# Patient Record
Sex: FEMALE | Race: WHITE | NOT HISPANIC OR LATINO | Employment: OTHER | URBAN - METROPOLITAN AREA
[De-identification: names, ages, dates, MRNs, and addresses within clinical notes are randomized per-mention and may not be internally consistent; named-entity substitution may affect disease eponyms.]

---

## 2018-02-06 RX ORDER — LISINOPRIL 20 MG/1
20 TABLET ORAL 2 TIMES DAILY
Status: ON HOLD | COMMUNITY
End: 2020-05-02 | Stop reason: SDUPTHER

## 2018-02-06 RX ORDER — METOPROLOL TARTRATE 100 MG/1
25 TABLET ORAL EVERY 12 HOURS SCHEDULED
COMMUNITY
End: 2020-02-24 | Stop reason: DRUGHIGH

## 2018-02-06 RX ORDER — ACETAMINOPHEN 160 MG
2000 TABLET,DISINTEGRATING ORAL EVERY MORNING
COMMUNITY
End: 2021-08-10 | Stop reason: SDUPTHER

## 2018-02-09 ENCOUNTER — HOSPITAL ENCOUNTER (OUTPATIENT)
Facility: HOSPITAL | Age: 68
Setting detail: OUTPATIENT SURGERY
Discharge: HOME | End: 2018-02-09
Attending: COLON & RECTAL SURGERY | Admitting: COLON & RECTAL SURGERY
Payer: COMMERCIAL

## 2018-02-09 ENCOUNTER — ANESTHESIA (OUTPATIENT)
Dept: PERIOP | Facility: HOSPITAL | Age: 68
End: 2018-02-09
Payer: COMMERCIAL

## 2018-02-09 ENCOUNTER — ANESTHESIA EVENT (OUTPATIENT)
Dept: PERIOP | Facility: HOSPITAL | Age: 68
End: 2018-02-09
Payer: COMMERCIAL

## 2018-02-09 VITALS
SYSTOLIC BLOOD PRESSURE: 156 MMHG | DIASTOLIC BLOOD PRESSURE: 80 MMHG | TEMPERATURE: 97.7 F | RESPIRATION RATE: 16 BRPM | BODY MASS INDEX: 34.53 KG/M2 | OXYGEN SATURATION: 99 % | WEIGHT: 220 LBS | HEART RATE: 73 BPM | HEIGHT: 67 IN

## 2018-02-09 DIAGNOSIS — K63.5 POLYP OF COLON: ICD-10-CM

## 2018-02-09 PROCEDURE — 88305 TISSUE EXAM BY PATHOLOGIST: CPT | Performed by: COLON & RECTAL SURGERY

## 2018-02-09 PROCEDURE — 88305 TISSUE EXAM BY PATHOLOGIST: CPT | Performed by: PATHOLOGY

## 2018-02-09 PROCEDURE — 88307 TISSUE EXAM BY PATHOLOGIST: CPT | Performed by: PATHOLOGY

## 2018-02-09 PROCEDURE — 88307 TISSUE EXAM BY PATHOLOGIST: CPT | Performed by: COLON & RECTAL SURGERY

## 2018-02-09 RX ORDER — FENTANYL CITRATE 50 UG/ML
INJECTION, SOLUTION INTRAMUSCULAR; INTRAVENOUS AS NEEDED
Status: DISCONTINUED | OUTPATIENT
Start: 2018-02-09 | End: 2018-02-09 | Stop reason: SURG

## 2018-02-09 RX ORDER — PROPOFOL 10 MG/ML
INJECTION, EMULSION INTRAVENOUS AS NEEDED
Status: DISCONTINUED | OUTPATIENT
Start: 2018-02-09 | End: 2018-02-09 | Stop reason: SURG

## 2018-02-09 RX ORDER — OXYCODONE HYDROCHLORIDE AND ACETAMINOPHEN 5; 325 MG/1; MG/1
2 TABLET ORAL EVERY 6 HOURS PRN
Qty: 20 TABLET | Refills: 0 | Status: SHIPPED | OUTPATIENT
Start: 2018-02-09 | End: 2018-02-19

## 2018-02-09 RX ORDER — ONDANSETRON 2 MG/ML
4 INJECTION INTRAMUSCULAR; INTRAVENOUS ONCE AS NEEDED
Status: DISCONTINUED | OUTPATIENT
Start: 2018-02-09 | End: 2018-02-09 | Stop reason: HOSPADM

## 2018-02-09 RX ORDER — EPHEDRINE SULFATE 50 MG/ML
INJECTION, SOLUTION INTRAVENOUS AS NEEDED
Status: DISCONTINUED | OUTPATIENT
Start: 2018-02-09 | End: 2018-02-09 | Stop reason: SURG

## 2018-02-09 RX ORDER — SODIUM CHLORIDE, SODIUM LACTATE, POTASSIUM CHLORIDE, CALCIUM CHLORIDE 600; 310; 30; 20 MG/100ML; MG/100ML; MG/100ML; MG/100ML
50 INJECTION, SOLUTION INTRAVENOUS CONTINUOUS
Status: DISCONTINUED | OUTPATIENT
Start: 2018-02-09 | End: 2018-02-09 | Stop reason: HOSPADM

## 2018-02-09 RX ORDER — OXYCODONE HYDROCHLORIDE 5 MG/1
10 TABLET ORAL EVERY 4 HOURS PRN
Status: DISCONTINUED | OUTPATIENT
Start: 2018-02-09 | End: 2018-02-09 | Stop reason: HOSPADM

## 2018-02-09 RX ORDER — ONDANSETRON 2 MG/ML
4 INJECTION INTRAMUSCULAR; INTRAVENOUS EVERY 4 HOURS PRN
Status: DISCONTINUED | OUTPATIENT
Start: 2018-02-09 | End: 2018-02-09 | Stop reason: HOSPADM

## 2018-02-09 RX ORDER — BUPIVACAINE HYDROCHLORIDE AND EPINEPHRINE 2.5; 5 MG/ML; UG/ML
INJECTION, SOLUTION EPIDURAL; INFILTRATION; INTRACAUDAL; PERINEURAL AS NEEDED
Status: DISCONTINUED | OUTPATIENT
Start: 2018-02-09 | End: 2018-02-09 | Stop reason: HOSPADM

## 2018-02-09 RX ORDER — HEPARIN SODIUM 5000 [USP'U]/ML
5000 INJECTION, SOLUTION INTRAVENOUS; SUBCUTANEOUS ONCE
Status: COMPLETED | OUTPATIENT
Start: 2018-02-09 | End: 2018-02-09

## 2018-02-09 RX ORDER — OXYCODONE HYDROCHLORIDE 5 MG/1
5 TABLET ORAL EVERY 4 HOURS PRN
Status: DISCONTINUED | OUTPATIENT
Start: 2018-02-09 | End: 2018-02-09 | Stop reason: HOSPADM

## 2018-02-09 RX ORDER — ONDANSETRON 2 MG/ML
INJECTION INTRAMUSCULAR; INTRAVENOUS AS NEEDED
Status: DISCONTINUED | OUTPATIENT
Start: 2018-02-09 | End: 2018-02-09 | Stop reason: SURG

## 2018-02-09 RX ORDER — LIDOCAINE HYDROCHLORIDE 10 MG/ML
INJECTION, SOLUTION INFILTRATION; PERINEURAL AS NEEDED
Status: DISCONTINUED | OUTPATIENT
Start: 2018-02-09 | End: 2018-02-09 | Stop reason: SURG

## 2018-02-09 RX ORDER — SUCCINYLCHOLINE CHLORIDE 20 MG/ML
INJECTION INTRAMUSCULAR; INTRAVENOUS AS NEEDED
Status: DISCONTINUED | OUTPATIENT
Start: 2018-02-09 | End: 2018-02-09 | Stop reason: SURG

## 2018-02-09 RX ORDER — FENTANYL CITRATE/PF 50 MCG/ML
25 SYRINGE (ML) INJECTION AS NEEDED
Status: DISCONTINUED | OUTPATIENT
Start: 2018-02-09 | End: 2018-02-09 | Stop reason: HOSPADM

## 2018-02-09 RX ADMIN — ONDANSETRON 4 MG: 2 INJECTION INTRAMUSCULAR; INTRAVENOUS at 14:53

## 2018-02-09 RX ADMIN — METRONIDAZOLE 500 MG: 500 INJECTION, SOLUTION INTRAVENOUS at 13:16

## 2018-02-09 RX ADMIN — FENTANYL CITRATE 50 MCG: 50 INJECTION, SOLUTION INTRAMUSCULAR; INTRAVENOUS at 15:07

## 2018-02-09 RX ADMIN — HEPARIN SODIUM 5000 UNITS: 5000 INJECTION, SOLUTION INTRAVENOUS; SUBCUTANEOUS at 11:54

## 2018-02-09 RX ADMIN — CEFAZOLIN SODIUM 2000 MG: 2 SOLUTION INTRAVENOUS at 13:15

## 2018-02-09 RX ADMIN — DEXAMETHASONE SODIUM PHOSPHATE 10 MG: 10 INJECTION INTRAMUSCULAR; INTRAVENOUS at 13:10

## 2018-02-09 RX ADMIN — SUCCINYLCHOLINE CHLORIDE 100 MG: 20 INJECTION, SOLUTION INTRAMUSCULAR; INTRAVENOUS at 13:02

## 2018-02-09 RX ADMIN — SODIUM CHLORIDE, SODIUM LACTATE, POTASSIUM CHLORIDE, AND CALCIUM CHLORIDE 50 ML/HR: .6; .31; .03; .02 INJECTION, SOLUTION INTRAVENOUS at 10:55

## 2018-02-09 RX ADMIN — LIDOCAINE HYDROCHLORIDE 50 MG: 10 INJECTION, SOLUTION INFILTRATION; PERINEURAL at 13:02

## 2018-02-09 RX ADMIN — PROPOFOL 150 MG: 10 INJECTION, EMULSION INTRAVENOUS at 13:02

## 2018-02-09 RX ADMIN — FENTANYL CITRATE 25 MCG: 50 INJECTION, SOLUTION INTRAMUSCULAR; INTRAVENOUS at 14:12

## 2018-02-09 RX ADMIN — EPHEDRINE SULFATE 5 MG: 50 INJECTION, SOLUTION INTRAMUSCULAR; INTRAVENOUS; SUBCUTANEOUS at 14:12

## 2018-02-09 RX ADMIN — SODIUM CHLORIDE, SODIUM LACTATE, POTASSIUM CHLORIDE, AND CALCIUM CHLORIDE: .6; .31; .03; .02 INJECTION, SOLUTION INTRAVENOUS at 14:39

## 2018-02-09 RX ADMIN — FENTANYL CITRATE 25 MCG: 50 INJECTION, SOLUTION INTRAMUSCULAR; INTRAVENOUS at 14:32

## 2018-02-09 RX ADMIN — FENTANYL CITRATE 50 MCG: 50 INJECTION, SOLUTION INTRAMUSCULAR; INTRAVENOUS at 13:02

## 2018-02-09 RX ADMIN — FENTANYL CITRATE 50 MCG: 50 INJECTION, SOLUTION INTRAMUSCULAR; INTRAVENOUS at 13:24

## 2018-02-09 NOTE — PERIOPERATIVE NURSING NOTE
VSS, pt denies pain or nausea, assessment unchanged, report called, no questions, pt transferred to [facility]

## 2018-02-09 NOTE — ANESTHESIA PREPROCEDURE EVALUATION
Review of Systems/Medical History  Patient summary reviewed  Chart reviewed  No history of anesthetic complications     Cardiovascular  EKG reviewed, Negative cardio ROS Exercise tolerance: good,  Hypertension ,    Pulmonary       GI/Hepatic    GI malignancy,        Negative  ROS        Endo/Other  Negative endo/other ROS      GYN       Hematology  Negative hematology ROS      Musculoskeletal  Negative musculoskeletal ROS        Neurology  Negative neurology ROS      Psychology   Anxiety,              Physical Exam    Airway    Mallampati score: II  TM Distance: >3 FB  Neck ROM: full     Dental   No notable dental hx     Cardiovascular  Comment: Negative ROS, Rhythm: regular, Rate: normal,     Pulmonary  Breath sounds clear to auscultation,     Other Findings        Anesthesia Plan  ASA Score- 2     Anesthesia Type- general with ASA Monitors  Additional Monitors:   Airway Plan:         Plan Factors-    Induction- intravenous  Postoperative Plan- Plan for postoperative opioid use  Planned trial extubation    Informed Consent- Anesthetic plan and risks discussed with patient and spouse  I personally reviewed this patient with the CRNA  Discussed and agreed on the Anesthesia Plan with the CRNA  Hugh Lambert

## 2018-02-09 NOTE — OP NOTE
OPERATIVE REPORT  PATIENT NAME: Julia Pace    :  1950  MRN: [id number]  Pt Location: [facility] OR ROOM 04    SURGERY DATE: 2018    Surgeon(s) and Role:     * Mary Patel MD - Primary     * Darryll Mcardle, MD - Assisting    Preop Diagnosis:  Rectal polyp    Post-Op Diagnosis Codes:  Rectal polyp    Procedure:   Transanal excision of large rectal polyp  Mucosal proctectomy    Specimen(s):  ID Type Source Tests Collected by Time Destination   1 : SUPERIOR MARGIN POLYPOID TISSUE Tissue Other TISSUE EXAM Mary Patel MD 2018 1403    2 : RECTAL POLYP Tissue Polyp, Colorectal TISSUE EXAM Mary Patel MD 2018 1432        Estimated Blood Loss:   Minimal    Drains:       Anesthesia Type:   General    Operative Indications:  Rectal polyp    Operative Findings:  Rectal polyp    Complications:   None    Procedure and Technique:  The patient was placed in a prone jorge-knife position with the buttocks taped apart  The anal area was prepped using Betadine and draped in a sterile manner  A time-out was done  Anal inspection was unremarkable  Digital rectal exam was normal except for a very soft and difficult to identify post anal polyp  This incorporated much of the circumference on the region extending from the left posterolateral area around the posterior rectum to the right anterior region  This incorporated more than half the circumference of the rectum  The polyp extended from the anal transitional zone proximally to the mid rectum, approximately 10 cm from the anal verge  Transanal excision was undertaken with a method of perineal mucosal proctectomy (Delorme procedure)  We used a Lambert bivalve retractor for most of the procedure but also used Titus tube retractors and Titus anal retractors  The anterior and left anterior regions were not included in this procedure but the remainder of the circumference of the rectum was    We 1st used an anal retractor to identify the distal polyp which was soft and frondlike  1 cm distal to the polyp, a cautery line was drawn to obtain a 1 cm diameter margin on the polyp  Margins were retained circumferentially but on evaluation of the postoperative specimen, there was substantial contraction of these margins  The dissection distally was taken down to the level of the submucosal plane and then was diverted superiorly in the avascular submucosa  The muscularis propria was  from the mucosa in this plane as we dissected superiorly  This was essentially a Delorme procedure in 3 corners of the rectum  The polyp layers  from the muscularis propria nicely  A few mucosal defects were created while retracting the mucosa and polyp but this was only after the mucosa was cleanly dissected away from the attachments and we were using the polyp as an anchor for retraction  The dissection itself did not involve any breaks in the mucosal plane  At no point did we identify any penetration of the polypoid tissue deep to the mucosa  The dissection was continued superiorly until we had the polyp freed up from its attachments with a margin  At this time the proximal sides of the polyp were amputated  In the left anterior position, a clean margin was obviously delineated  On the right side and anteriorly, there was some more difficulty and the margin was not created before the dissection but there was some tearing of the mucosa there  We reinspected that region specifically to ensure that there was no evidence of polyp remaining at the conclusion of the dissection  The specimen was tacked out on a needle board with orientation for the pathologist   The areas of dissection were inspected and all mucosal borders appeared to be free of polyp      The wound was then closed internally using a reefing method to incorporate muscularis propria in a pleated fashion to appose the proximal rectal mucosa to the anoderm or transitional zone as was found on the distal margin of the initial dissection  This full-thickness closure was done to minimize risk for bleeding and to reduce dead space  At the conclusion of the procedure, we inspected all the mucosal edges and had to reinforce the right posterior region with another line of 0 Vicryl sutures to bring the mucosa more snugly to the distal margin  The final product was a mucosa to mucosa closure with no bleeding or gaps  No polypoid tissue was evident  The lumen was widely open  Sponge needle and instrument counts were correct       I was present for the entire procedure    Patient Disposition:  PACU     SIGNATURE: Norman Coello MD  DATE: February 9, 2018  TIME: 3:17 PM

## 2018-02-09 NOTE — H&P
History and Physical   Colon and Rectal Surgery   Heidi Scott 78 y o  female MRN: [id number]  Unit/Bed#: OR POOL Encounter: [id number]  02/09/18   12:42 PM      CC: Extensive rectal polyp  History of Present Illness   HPI:  Heidi Scott is a 78 y o  female with a nearly circumferential rectal polyp extending from the anorectal angle to 10 cm from the anal verge  Historical Information   Past Medical History:   Diagnosis Date    Anxiety     Colon polyps     Hypertension      Past Surgical History:   Procedure Laterality Date    COLONOSCOPY      NO PAST SURGERIES         Meds/Allergies     Prescriptions Prior to Admission   Medication    cholecalciferol (VITAMIN D3) 1,000 units tablet    lisinopril (ZESTRIL) 20 mg tablet    metoprolol tartrate (LOPRESSOR) 100 mg tablet         Current Facility-Administered Medications:     ceFAZolin (ANCEF) IVPB (premix) 2,000 mg, 2,000 mg, Intravenous, Once, Miller Avila MD    lactated ringers infusion, 50 mL/hr, Intravenous, Continuous, W Sanchez Gutierrez MD, Last Rate: 50 mL/hr at 02/09/18 1055, 50 mL/hr at 02/09/18 1055    metroNIDAZOLE (FLAGYL) IVPB (premix) 500 mg, 500 mg, Intravenous, Once, Miller Avila MD    No Known Allergies      Social History   History   Alcohol Use    0 6 oz/week    1 Glasses of wine per week     History   Drug use: Unknown     History   Smoking Status    Former Smoker   Smokeless Tobacco    Never Used     Comment: 27  YEARS AGO          Family History: History reviewed  No pertinent family history        Objective     Current Vitals:   Blood Pressure: (!) 191/90 (r  n  aware) (02/09/18 1035)  Pulse: 74 (02/09/18 1035)  Temperature: 98 4 °F (36 9 °C) (02/09/18 1035)  Temp Source: Oral (02/09/18 1035)  Respirations: 20 (02/09/18 1035)  Height: 5' 7" (170 2 cm) (02/09/18 1051)  Weight - Scale: 99 8 kg (220 lb) (02/09/18 1051)  SpO2: 97 % (02/09/18 1035)  No intake or output data in the 24 hours ending 02/09/18 1242    Physical Exam:  General:  Well nourished, no distress  Neuro: Alert and oriented  Eyes:Sclera anicteric, conjunctiva pink  Pulm: Clear to auscultation bilaterally  No respiratory Distress  CV:  Regular rate and rhythm  No murmurs  Abdomen:  Soft, flat, non-tender, without masses or hepatosplenomegaly  Lab Results:       ASSESSMENT:  Lyndsay Galdamez is a 78 y o  female for transanal excision of rectal polyp  PLAN:  Risks of surgery, including but not limited to bleeding, pain, infection, hernia formation, injury to the ureter or other internal organs requiring surgery specific to these injuries, deep vein thrombosis, pulmonary embolism, myocardial infarction or heart failure, stroke, death, need for and enterostomy, or other unspecified complications were discussed  Benefits and alternatives were discussed  Questions were answered    Andrews Santos MD

## 2018-02-09 NOTE — DISCHARGE INSTRUCTIONS
Instructions    - Take pain medication as prescribed for post op pain    - Eat a high fiber diet and plenty of fluids    - Some amount of bleeding is normal and may be expected, but for persistent rectal bleeding please return to the emergency department    - Take several sitz baths daily, this will help keep the area clean and relieve pain  Instructions below     [employer]   WHAT YOU NEED TO KNOW:   A sitz bath is when you soak in a warm or hot water tub to promote wound healing  It is often done after surgeries on the rectal or genital area  The heat from the water will clean the area, improve blood flow for healing, and decrease swelling and pain  DISCHARGE INSTRUCTIONS:   Gather your sitz bath supplies:   · A bathtub thermometer to check the water temperature    · Towels to cover your upper body during the bath and to dry off after    · A footstool to help you enter the bathtub if needed    · Bath mats to prevent slips in the tub and after you get out of the tub    · Clean clothes to wear after the bath  Fill the bathtub with water:  Fill the bathtub with water until it is at about the level of your belly button  Check the temperature of the water before you get in  For a warm water sitz bath, the water should be 94° to 98° Fahrenheit  A hot water sitz bath should be between 105° to 110° Fahrenheit  Stay in the bath for about 15 to 20 minutes  Portable sitz bath: You may be sent home with a portable sitz bath if you cannot get in and out of the bathtub  This is a small tub that will fit under your toilet seat  You will fill the tub with water as directed once it is under the toilet seat  Check the temperature of the water  You will also have a squirt bottle or water bag filled with the warm water  These are used to let the water flow out over the wound area during the sitz bath  This is also done for 15 to 20 minutes    Important tips when taking a sitz bath:   · You may have some discomfort when at first when you sit in the warm water  This should go away shortly after entering the tub  · Check the water temperature a few times during the bath  You may need to add more water to keep it at the right temperature  · Take a sitz bath when someone is close by to help you if needed  The heat from the water may cause you to feel weak or lightheaded  If this happens, call for help to get out of the tub  Do not stand up on your own in case you lose your balance

## 2018-02-09 NOTE — ANESTHESIA POSTPROCEDURE EVALUATION
Post-Op Assessment Note      CV Status:  Stable    Mental Status:  Alert and awake    Hydration Status:  Euvolemic    PONV Controlled:  Controlled    Airway Patency:  Patent    Post Op Vitals Reviewed: Yes          Staff: Anesthesiologist, CRNA           /77 (02/09/18 1505)    Temp 99 2 °F (37 3 °C) (02/09/18 1505)    Pulse 75 (02/09/18 1505)   Resp 14 (02/09/18 1505)    SpO2 99 % (02/09/18 1505)

## 2018-05-04 LAB
25(OH)D3 SERPL-MCNC: 47.1 NG/ML
ALBUMIN (HISTORICAL): 1.8 MG/DL
ALBUMIN CREATININE RATIO (HISTORICAL): 10.2 MG/G
ALBUMIN SERPL BCP-MCNC: 4.3 G/DL (ref 3.5–5.7)
ALP SERPL-CCNC: 54 IU/L (ref 55–165)
ALT SERPL W P-5'-P-CCNC: 13 IU/L (ref 10–30)
ANION GAP SERPL CALCULATED.3IONS-SCNC: 12.6 MM/L
AST SERPL W P-5'-P-CCNC: 16 U/L (ref 7–26)
BACTERIA UR QL AUTO: ABNORMAL
BASOPHILS # BLD AUTO: 0.1 X3/UL (ref 0–0.3)
BASOPHILS # BLD AUTO: 0.8 % (ref 0–2)
BILIRUB SERPL-MCNC: 0.7 MG/DL (ref 0.3–1)
BILIRUB UR QL STRIP: NEGATIVE
BUN SERPL-MCNC: 9 MG/DL (ref 7–25)
CALCIUM SERPL-MCNC: 9.3 MG/DL (ref 8.6–10.5)
CHLORIDE SERPL-SCNC: 98 MM/L (ref 98–107)
CHOLEST SERPL-MCNC: 275 MG/DL (ref 0–200)
CLARITY UR: CLEAR
CO2 SERPL-SCNC: 27 MM/L (ref 21–31)
COLOR UR: YELLOW
CREAT SERPL-MCNC: 1.06 MG/DL (ref 0.6–1.2)
CREATININE, RANDOM URINE (HISTORICAL): 176.1 MG/DL
DEPRECATED RDW RBC AUTO: 13.3 % (ref 11.5–14.5)
EGFR (HISTORICAL): 52 GFR
EGFR AFRICAN AMERICAN (HISTORICAL): > 60 GFR
EOSINOPHIL # BLD AUTO: 0.1 X3/UL (ref 0–0.5)
EOSINOPHIL NFR BLD AUTO: 1.9 % (ref 0–5)
GLUCOSE (HISTORICAL): 101 MG/DL (ref 65–99)
GLUCOSE UR STRIP-MCNC: NEGATIVE MG/DL
HCT VFR BLD AUTO: 40.2 % (ref 37–47)
HDLC SERPL-MCNC: 41 MG/DL (ref 40–60)
HGB BLD-MCNC: 13.4 G/DL (ref 12–16)
HGB UR QL STRIP.AUTO: ABNORMAL
KETONES UR STRIP-MCNC: NEGATIVE MG/DL
LDLC SERPL CALC-MCNC: 189.6 MG/DL (ref 75–193)
LEUKOCYTE ESTERASE UR QL STRIP: ABNORMAL
LYMPHOCYTES # BLD AUTO: 2.6 X3/UL (ref 1.2–4.2)
LYMPHOCYTES NFR BLD AUTO: 41.5 % (ref 20.5–51.1)
MCH RBC QN AUTO: 28.9 PG (ref 26–34)
MCHC RBC AUTO-ENTMCNC: 33.3 G/DL (ref 31–36)
MCV RBC AUTO: 86.9 FL (ref 81–99)
MONOCYTES # BLD AUTO: 0.5 X3/UL (ref 0–1)
MONOCYTES NFR BLD AUTO: 8.2 % (ref 1.7–12)
NEUTROPHILS # BLD AUTO: 3 X3/UL (ref 1.4–6.5)
NEUTS SEG NFR BLD AUTO: 47.6 % (ref 42.2–75.2)
NITRITE UR QL STRIP: NEGATIVE
NON-SQ EPI CELLS URNS QL MICRO: ABNORMAL /HPF
OSMOLALITY, SERUM (HISTORICAL): 267 MOSM (ref 262–291)
PH UR STRIP.AUTO: 6 [PH] (ref 4.5–8)
PLATELET # BLD AUTO: 390 X3/UL (ref 130–400)
PMV BLD AUTO: 7.3 FL (ref 8.6–11.7)
POTASSIUM SERPL-SCNC: 3.6 MM/L (ref 3.5–5.5)
PROT UR STRIP-MCNC: NEGATIVE MG/DL
RBC # BLD AUTO: 4.63 X6/UL (ref 3.9–5.2)
RBC #/AREA URNS AUTO: ABNORMAL /HPF
SODIUM SERPL-SCNC: 134 MM/L (ref 134–143)
SP GR UR STRIP.AUTO: 1.01 (ref 1–1.03)
TOTAL PROTEIN (HISTORICAL): 7.8 G/DL (ref 6.4–8.9)
TRIGL SERPL-MCNC: 221 MG/DL (ref 44–166)
UROBILINOGEN UR QL STRIP.AUTO: 0.2 EU/DL (ref 0.2–8)
VLDL CHOLESTEROL (HISTORICAL): 44 MG/DL (ref 5–51)
WBC # BLD AUTO: 6.3 X3/UL (ref 4.8–10.8)
WBC #/AREA URNS AUTO: ABNORMAL /HPF

## 2018-05-30 LAB
ANION GAP SERPL CALCULATED.3IONS-SCNC: 13.1 MM/L
BUN SERPL-MCNC: 12 MG/DL (ref 7–25)
CALCIUM SERPL-MCNC: 9.5 MG/DL (ref 8.6–10.5)
CHLORIDE SERPL-SCNC: 92 MM/L (ref 98–107)
CO2 SERPL-SCNC: 29 MM/L (ref 21–31)
CREAT SERPL-MCNC: 1 MG/DL (ref 0.6–1.2)
EGFR (HISTORICAL): 55 GFR
EGFR AFRICAN AMERICAN (HISTORICAL): > 60 GFR
GLUCOSE (HISTORICAL): 98 MG/DL (ref 65–99)
OSMOLALITY, SERUM (HISTORICAL): 261 MOSM (ref 262–291)
POTASSIUM SERPL-SCNC: 4.1 MM/L (ref 3.5–5.5)
SODIUM SERPL-SCNC: 130 MM/L (ref 134–143)

## 2018-09-11 ENCOUNTER — TRANSCRIBE ORDERS (OUTPATIENT)
Dept: ADMINISTRATIVE | Facility: HOSPITAL | Age: 68
End: 2018-09-11

## 2018-09-11 ENCOUNTER — APPOINTMENT (OUTPATIENT)
Dept: LAB | Facility: HOSPITAL | Age: 68
End: 2018-09-11
Attending: INTERNAL MEDICINE
Payer: COMMERCIAL

## 2018-09-11 DIAGNOSIS — N18.30 CHRONIC KIDNEY DISEASE, STAGE III (MODERATE) (HCC): Primary | ICD-10-CM

## 2018-09-11 DIAGNOSIS — E78.5 HYPERLIPIDEMIA, UNSPECIFIED HYPERLIPIDEMIA TYPE: ICD-10-CM

## 2018-09-11 DIAGNOSIS — N18.30 CHRONIC KIDNEY DISEASE, STAGE III (MODERATE) (HCC): ICD-10-CM

## 2018-09-11 LAB
ALBUMIN SERPL BCP-MCNC: 4.3 G/DL (ref 3.5–5.7)
ALP SERPL-CCNC: 53 U/L (ref 55–165)
ALT SERPL W P-5'-P-CCNC: 15 U/L (ref 7–52)
ANION GAP SERPL CALCULATED.3IONS-SCNC: 8 MMOL/L (ref 4–13)
AST SERPL W P-5'-P-CCNC: 14 U/L (ref 13–39)
BILIRUB SERPL-MCNC: 0.4 MG/DL (ref 0.2–1)
BUN SERPL-MCNC: 12 MG/DL (ref 7–25)
CALCIUM SERPL-MCNC: 9.5 MG/DL (ref 8.6–10.5)
CHLORIDE SERPL-SCNC: 103 MMOL/L (ref 98–107)
CO2 SERPL-SCNC: 29 MMOL/L (ref 21–31)
CREAT SERPL-MCNC: 1.01 MG/DL (ref 0.6–1.2)
GFR SERPL CREATININE-BSD FRML MDRD: 58 ML/MIN/1.73SQ M
GLUCOSE SERPL-MCNC: 93 MG/DL (ref 65–99)
LDLC SERPL DIRECT ASSAY-MCNC: 66.7 MG/DL (ref 75–193)
POTASSIUM SERPL-SCNC: 4.2 MMOL/L (ref 3.5–5.5)
PROT SERPL-MCNC: 7.4 G/DL (ref 6.4–8.9)
SODIUM SERPL-SCNC: 140 MMOL/L (ref 134–143)

## 2018-09-11 PROCEDURE — 80053 COMPREHEN METABOLIC PANEL: CPT

## 2018-09-11 PROCEDURE — 83721 ASSAY OF BLOOD LIPOPROTEIN: CPT

## 2018-09-11 PROCEDURE — 36415 COLL VENOUS BLD VENIPUNCTURE: CPT

## 2019-05-15 ENCOUNTER — TRANSCRIBE ORDERS (OUTPATIENT)
Dept: ADMINISTRATIVE | Facility: HOSPITAL | Age: 69
End: 2019-05-15

## 2019-05-15 DIAGNOSIS — Z12.39 BREAST SCREENING, UNSPECIFIED: Primary | ICD-10-CM

## 2019-05-16 ENCOUNTER — TRANSCRIBE ORDERS (OUTPATIENT)
Dept: ADMINISTRATIVE | Facility: HOSPITAL | Age: 69
End: 2019-05-16

## 2019-05-16 ENCOUNTER — APPOINTMENT (OUTPATIENT)
Dept: LAB | Facility: HOSPITAL | Age: 69
End: 2019-05-16
Attending: INTERNAL MEDICINE
Payer: COMMERCIAL

## 2019-05-16 DIAGNOSIS — E55.9 VITAMIN D DEFICIENCY: ICD-10-CM

## 2019-05-16 DIAGNOSIS — N18.30 CHRONIC KIDNEY DISEASE, STAGE III (MODERATE) (HCC): ICD-10-CM

## 2019-05-16 DIAGNOSIS — R80.9 PROTEINURIA, UNSPECIFIED TYPE: ICD-10-CM

## 2019-05-16 DIAGNOSIS — E78.5 HYPERLIPIDEMIA, UNSPECIFIED HYPERLIPIDEMIA TYPE: ICD-10-CM

## 2019-05-16 DIAGNOSIS — N18.30 CHRONIC KIDNEY DISEASE, STAGE III (MODERATE) (HCC): Primary | ICD-10-CM

## 2019-05-16 LAB
25(OH)D3 SERPL-MCNC: 30.1 NG/ML (ref 30–100)
ALBUMIN SERPL BCP-MCNC: 4.4 G/DL (ref 3.5–5.7)
ALP SERPL-CCNC: 51 U/L (ref 55–165)
ALT SERPL W P-5'-P-CCNC: 15 U/L (ref 7–52)
ANION GAP SERPL CALCULATED.3IONS-SCNC: 10 MMOL/L (ref 4–13)
AST SERPL W P-5'-P-CCNC: 12 U/L (ref 13–39)
BACTERIA UR QL AUTO: ABNORMAL /HPF
BASOPHILS # BLD AUTO: 0.1 THOUSANDS/ΜL (ref 0–0.1)
BASOPHILS NFR BLD AUTO: 1 % (ref 0–2)
BILIRUB SERPL-MCNC: 0.5 MG/DL (ref 0.2–1)
BILIRUB UR QL STRIP: NEGATIVE
BUN SERPL-MCNC: 22 MG/DL (ref 7–25)
CALCIUM SERPL-MCNC: 9.8 MG/DL (ref 8.6–10.5)
CHLORIDE SERPL-SCNC: 98 MMOL/L (ref 98–107)
CHOLEST SERPL-MCNC: 150 MG/DL (ref 0–200)
CLARITY UR: ABNORMAL
CO2 SERPL-SCNC: 29 MMOL/L (ref 21–31)
COLOR UR: YELLOW
CREAT SERPL-MCNC: 1.2 MG/DL (ref 0.6–1.2)
CREAT UR-MCNC: 133 MG/DL
CREAT UR-MCNC: 133 MG/DL
EOSINOPHIL # BLD AUTO: 0.1 THOUSAND/ΜL (ref 0–0.61)
EOSINOPHIL NFR BLD AUTO: 2 % (ref 0–5)
ERYTHROCYTE [DISTWIDTH] IN BLOOD BY AUTOMATED COUNT: 13.3 % (ref 11.5–14.5)
GFR SERPL CREATININE-BSD FRML MDRD: 47 ML/MIN/1.73SQ M
GLUCOSE P FAST SERPL-MCNC: 101 MG/DL (ref 65–99)
GLUCOSE UR STRIP-MCNC: NEGATIVE MG/DL
HCT VFR BLD AUTO: 40.2 % (ref 42–47)
HDLC SERPL-MCNC: 47 MG/DL (ref 40–60)
HGB BLD-MCNC: 13.5 G/DL (ref 12–16)
HGB UR QL STRIP.AUTO: NEGATIVE
KETONES UR STRIP-MCNC: NEGATIVE MG/DL
LDLC SERPL CALC-MCNC: 63 MG/DL (ref 0–100)
LEUKOCYTE ESTERASE UR QL STRIP: ABNORMAL
LYMPHOCYTES # BLD AUTO: 2.8 THOUSANDS/ΜL (ref 0.6–4.47)
LYMPHOCYTES NFR BLD AUTO: 36 % (ref 21–51)
MCH RBC QN AUTO: 29.4 PG (ref 26–34)
MCHC RBC AUTO-ENTMCNC: 33.5 G/DL (ref 31–37)
MCV RBC AUTO: 88 FL (ref 81–99)
MICROALBUMIN UR-MCNC: 23.8 MG/L (ref 0–20)
MICROALBUMIN/CREAT 24H UR: 18 MG/G CREATININE (ref 0–30)
MONOCYTES # BLD AUTO: 0.6 THOUSAND/ΜL (ref 0.17–1.22)
MONOCYTES NFR BLD AUTO: 7 % (ref 2–12)
NEUTROPHILS # BLD AUTO: 4.3 THOUSANDS/ΜL (ref 1.4–6.5)
NEUTS SEG NFR BLD AUTO: 55 % (ref 42–75)
NITRITE UR QL STRIP: NEGATIVE
NON-SQ EPI CELLS URNS QL MICRO: ABNORMAL /HPF
NONHDLC SERPL-MCNC: 103 MG/DL
PH UR STRIP.AUTO: 6 [PH]
PLATELET # BLD AUTO: 441 THOUSANDS/UL (ref 149–390)
PMV BLD AUTO: 7.6 FL (ref 8.6–11.7)
POTASSIUM SERPL-SCNC: 4 MMOL/L (ref 3.5–5.5)
PROT SERPL-MCNC: 7.6 G/DL (ref 6.4–8.9)
PROT UR STRIP-MCNC: NEGATIVE MG/DL
PROT UR-MCNC: 11 MG/DL
PROT/CREAT UR: 0.08 MG/G{CREAT} (ref 0–0.1)
RBC # BLD AUTO: 4.58 MILLION/UL (ref 3.9–5.2)
RBC #/AREA URNS AUTO: ABNORMAL /HPF
SODIUM SERPL-SCNC: 137 MMOL/L (ref 134–143)
SP GR UR STRIP.AUTO: 1.01 (ref 1–1.03)
TRIGL SERPL-MCNC: 198 MG/DL (ref 44–166)
UROBILINOGEN UR QL STRIP.AUTO: 0.2 E.U./DL
WBC # BLD AUTO: 7.8 THOUSAND/UL (ref 4.8–10.8)
WBC #/AREA URNS AUTO: ABNORMAL /HPF

## 2019-05-16 PROCEDURE — 82043 UR ALBUMIN QUANTITATIVE: CPT | Performed by: INTERNAL MEDICINE

## 2019-05-16 PROCEDURE — 82306 VITAMIN D 25 HYDROXY: CPT

## 2019-05-16 PROCEDURE — 80061 LIPID PANEL: CPT

## 2019-05-16 PROCEDURE — 85025 COMPLETE CBC W/AUTO DIFF WBC: CPT

## 2019-05-16 PROCEDURE — 36415 COLL VENOUS BLD VENIPUNCTURE: CPT

## 2019-05-16 PROCEDURE — 80053 COMPREHEN METABOLIC PANEL: CPT

## 2019-05-16 PROCEDURE — 81001 URINALYSIS AUTO W/SCOPE: CPT

## 2019-05-16 PROCEDURE — 82570 ASSAY OF URINE CREATININE: CPT | Performed by: INTERNAL MEDICINE

## 2019-05-16 PROCEDURE — 84156 ASSAY OF PROTEIN URINE: CPT | Performed by: INTERNAL MEDICINE

## 2019-05-22 ENCOUNTER — HOSPITAL ENCOUNTER (OUTPATIENT)
Dept: MAMMOGRAPHY | Facility: HOSPITAL | Age: 69
Discharge: HOME | End: 2019-05-22
Attending: INTERNAL MEDICINE
Payer: COMMERCIAL

## 2019-05-22 VITALS — WEIGHT: 217 LBS | BODY MASS INDEX: 32.89 KG/M2 | HEIGHT: 68 IN

## 2019-05-22 DIAGNOSIS — Z12.39 BREAST SCREENING, UNSPECIFIED: ICD-10-CM

## 2019-05-22 PROCEDURE — 77067 SCR MAMMO BI INCL CAD: CPT

## 2019-05-22 PROCEDURE — 77063 BREAST TOMOSYNTHESIS BI: CPT

## 2019-07-03 PROBLEM — C20 RECTAL CANCER (HCC): Status: ACTIVE | Noted: 2019-07-03

## 2019-10-05 ENCOUNTER — APPOINTMENT (EMERGENCY)
Dept: CT IMAGING | Facility: HOSPITAL | Age: 69
End: 2019-10-05
Payer: COMMERCIAL

## 2019-10-05 ENCOUNTER — HOSPITAL ENCOUNTER (EMERGENCY)
Facility: HOSPITAL | Age: 69
Discharge: HOME | End: 2019-10-05
Payer: COMMERCIAL

## 2019-10-05 VITALS
RESPIRATION RATE: 18 BRPM | DIASTOLIC BLOOD PRESSURE: 91 MMHG | BODY MASS INDEX: 33.34 KG/M2 | TEMPERATURE: 98.7 F | HEART RATE: 60 BPM | OXYGEN SATURATION: 97 % | SYSTOLIC BLOOD PRESSURE: 154 MMHG | HEIGHT: 68 IN | WEIGHT: 220 LBS

## 2019-10-05 DIAGNOSIS — K52.9 COLITIS: Primary | ICD-10-CM

## 2019-10-05 LAB
ALBUMIN SERPL BCP-MCNC: 4.5 G/DL (ref 3.5–5.7)
ALP SERPL-CCNC: 51 U/L (ref 55–165)
ALT SERPL W P-5'-P-CCNC: 15 U/L (ref 7–52)
ANION GAP SERPL CALCULATED.3IONS-SCNC: 9 MMOL/L (ref 4–13)
APTT PPP: 27 SECONDS (ref 23–37)
AST SERPL W P-5'-P-CCNC: 17 U/L (ref 13–39)
BACTERIA UR QL AUTO: ABNORMAL /HPF
BASOPHILS # BLD AUTO: 0.1 THOUSANDS/ΜL (ref 0–0.1)
BASOPHILS NFR BLD AUTO: 1 % (ref 0–2)
BILIRUB SERPL-MCNC: 0.4 MG/DL (ref 0.2–1)
BILIRUB UR QL STRIP: ABNORMAL
BUN SERPL-MCNC: 19 MG/DL (ref 7–25)
CALCIUM SERPL-MCNC: 9.6 MG/DL (ref 8.6–10.5)
CAOX CRY URNS QL MICRO: ABNORMAL /HPF
CHLORIDE SERPL-SCNC: 93 MMOL/L (ref 98–107)
CLARITY UR: CLEAR
CO2 SERPL-SCNC: 25 MMOL/L (ref 21–31)
COLOR UR: YELLOW
CREAT SERPL-MCNC: 1.1 MG/DL (ref 0.6–1.2)
EOSINOPHIL # BLD AUTO: 0 THOUSAND/ΜL (ref 0–0.61)
EOSINOPHIL NFR BLD AUTO: 0 % (ref 0–5)
ERYTHROCYTE [DISTWIDTH] IN BLOOD BY AUTOMATED COUNT: 12.8 % (ref 11.5–14.5)
EXT FECAL OCCULT BLOOD SCREEN: POSITIVE
EXT. CONTROL ED NAV: ABNORMAL
GFR SERPL CREATININE-BSD FRML MDRD: 52 ML/MIN/1.73SQ M
GLUCOSE SERPL-MCNC: 132 MG/DL (ref 65–99)
GLUCOSE UR STRIP-MCNC: NEGATIVE MG/DL
HCT VFR BLD AUTO: 38.5 % (ref 42–47)
HGB BLD-MCNC: 13.2 G/DL (ref 12–16)
HGB UR QL STRIP.AUTO: ABNORMAL
HOLD SPECIMEN: NORMAL
HOLD SPECIMEN: NORMAL
HYALINE CASTS #/AREA URNS LPF: ABNORMAL /LPF
INR PPP: 0.97 (ref 0.9–1.5)
KETONES UR STRIP-MCNC: NEGATIVE MG/DL
LEUKOCYTE ESTERASE UR QL STRIP: NEGATIVE
LIPASE SERPL-CCNC: 26 U/L (ref 11–82)
LYMPHOCYTES # BLD AUTO: 1.5 THOUSANDS/ΜL (ref 0.6–4.47)
LYMPHOCYTES NFR BLD AUTO: 11 % (ref 21–51)
MCH RBC QN AUTO: 30.2 PG (ref 26–34)
MCHC RBC AUTO-ENTMCNC: 34.4 G/DL (ref 31–37)
MCV RBC AUTO: 88 FL (ref 81–99)
MONOCYTES # BLD AUTO: 0.5 THOUSAND/ΜL (ref 0.17–1.22)
MONOCYTES NFR BLD AUTO: 4 % (ref 2–12)
MUCOUS THREADS UR QL AUTO: ABNORMAL
NEUTROPHILS # BLD AUTO: 11.2 THOUSANDS/ΜL (ref 1.4–6.5)
NEUTS SEG NFR BLD AUTO: 84 % (ref 42–75)
NITRITE UR QL STRIP: NEGATIVE
NON-SQ EPI CELLS URNS QL MICRO: ABNORMAL /HPF
PH UR STRIP.AUTO: 6 [PH]
PLATELET # BLD AUTO: 456 THOUSANDS/UL (ref 149–390)
PMV BLD AUTO: 7.1 FL (ref 8.6–11.7)
POTASSIUM SERPL-SCNC: 3.6 MMOL/L (ref 3.5–5.5)
PROT SERPL-MCNC: 7.8 G/DL (ref 6.4–8.9)
PROT UR STRIP-MCNC: ABNORMAL MG/DL
PROTHROMBIN TIME: 11.3 SECONDS (ref 10.2–13)
RBC # BLD AUTO: 4.39 MILLION/UL (ref 3.9–5.2)
RBC #/AREA URNS AUTO: ABNORMAL /HPF
SODIUM SERPL-SCNC: 127 MMOL/L (ref 134–143)
SP GR UR STRIP.AUTO: 1.02 (ref 1–1.03)
TROPONIN I SERPL-MCNC: <0.03 NG/ML
TROPONIN I SERPL-MCNC: <0.03 NG/ML
UROBILINOGEN UR QL STRIP.AUTO: 0.2 E.U./DL
WBC # BLD AUTO: 13.3 THOUSAND/UL (ref 4.8–10.8)
WBC #/AREA URNS AUTO: ABNORMAL /HPF

## 2019-10-05 PROCEDURE — 85610 PROTHROMBIN TIME: CPT

## 2019-10-05 PROCEDURE — 80053 COMPREHEN METABOLIC PANEL: CPT

## 2019-10-05 PROCEDURE — 36415 COLL VENOUS BLD VENIPUNCTURE: CPT

## 2019-10-05 PROCEDURE — 83690 ASSAY OF LIPASE: CPT

## 2019-10-05 PROCEDURE — 85025 COMPLETE CBC W/AUTO DIFF WBC: CPT

## 2019-10-05 PROCEDURE — C9113 INJ PANTOPRAZOLE SODIUM, VIA: HCPCS

## 2019-10-05 PROCEDURE — 96361 HYDRATE IV INFUSION ADD-ON: CPT

## 2019-10-05 PROCEDURE — 96374 THER/PROPH/DIAG INJ IV PUSH: CPT

## 2019-10-05 PROCEDURE — 74177 CT ABD & PELVIS W/CONTRAST: CPT

## 2019-10-05 PROCEDURE — 84484 ASSAY OF TROPONIN QUANT: CPT

## 2019-10-05 PROCEDURE — 85730 THROMBOPLASTIN TIME PARTIAL: CPT

## 2019-10-05 PROCEDURE — 99284 EMERGENCY DEPT VISIT MOD MDM: CPT

## 2019-10-05 PROCEDURE — 82270 OCCULT BLOOD FECES: CPT

## 2019-10-05 PROCEDURE — 81001 URINALYSIS AUTO W/SCOPE: CPT

## 2019-10-05 RX ORDER — METRONIDAZOLE 500 MG/1
500 TABLET ORAL EVERY 8 HOURS SCHEDULED
Qty: 21 TABLET | Refills: 0 | Status: SHIPPED | OUTPATIENT
Start: 2019-10-05 | End: 2019-10-12

## 2019-10-05 RX ORDER — CIPROFLOXACIN 500 MG/1
500 TABLET, FILM COATED ORAL 2 TIMES DAILY
Qty: 14 TABLET | Refills: 0 | Status: SHIPPED | OUTPATIENT
Start: 2019-10-05 | End: 2019-10-12

## 2019-10-05 RX ORDER — CIPROFLOXACIN 500 MG/1
500 TABLET, FILM COATED ORAL ONCE
Status: COMPLETED | OUTPATIENT
Start: 2019-10-05 | End: 2019-10-05

## 2019-10-05 RX ORDER — PANTOPRAZOLE SODIUM 40 MG/1
40 INJECTION, POWDER, FOR SOLUTION INTRAVENOUS ONCE
Status: COMPLETED | OUTPATIENT
Start: 2019-10-05 | End: 2019-10-05

## 2019-10-05 RX ORDER — METRONIDAZOLE 500 MG/1
500 TABLET ORAL ONCE
Status: COMPLETED | OUTPATIENT
Start: 2019-10-05 | End: 2019-10-05

## 2019-10-05 RX ADMIN — IOHEXOL 50 ML: 240 INJECTION, SOLUTION INTRATHECAL; INTRAVASCULAR; INTRAVENOUS; ORAL at 08:03

## 2019-10-05 RX ADMIN — METRONIDAZOLE 500 MG: 500 TABLET, FILM COATED ORAL at 11:38

## 2019-10-05 RX ADMIN — SODIUM CHLORIDE 1000 ML: 0.9 INJECTION, SOLUTION INTRAVENOUS at 08:20

## 2019-10-05 RX ADMIN — CIPROFLOXACIN HYDROCHLORIDE 500 MG: 500 TABLET, FILM COATED ORAL at 11:38

## 2019-10-05 RX ADMIN — IOHEXOL 100 ML: 350 INJECTION, SOLUTION INTRAVENOUS at 10:28

## 2019-10-05 RX ADMIN — PANTOPRAZOLE SODIUM 40 MG: 40 INJECTION, POWDER, FOR SOLUTION INTRAVENOUS at 08:20

## 2019-10-05 NOTE — ED PROVIDER NOTES
History  Chief Complaint   Patient presents with    Abdominal Pain     bright blood aftyer having diarrhea     Leonard Simon is a 43-year-old female who came to the emergency department due to generalized abdominal pain associated with diarrhea with blood streaks with started 12 hours prior to arrival   Patient denies vomiting, fever or chills  On arrival in the emergency department patient is almost pain-free  Patient denies hematuria, dysuria or frequency of urination  History provided by:  Patient   used: No    Abdominal Pain   Pain location:  Generalized  Pain quality: cramping    Pain radiates to:  Does not radiate  Pain severity:  Mild  Onset quality:  Sudden  Duration:  12 hours  Timing:  Intermittent  Progression:  Waxing and waning  Chronicity:  New  Context: not alcohol use, not awakening from sleep, not diet changes, not eating, not laxative use, not medication withdrawal, not previous surgeries, not recent illness, not recent sexual activity, not recent travel, not retching, not sick contacts, not suspicious food intake and not trauma    Relieved by:  Nothing  Worsened by:  Nothing  Ineffective treatments:  None tried  Associated symptoms: diarrhea and hematochezia    Associated symptoms: no anorexia, no belching, no chest pain, no chills, no constipation, no cough, no dysuria, no fatigue, no fever, no flatus, no hematemesis, no hematuria, no melena, no nausea, no shortness of breath, no sore throat, no vaginal bleeding, no vaginal discharge and no vomiting    Diarrhea:     Quality:  Blood-tinged    Number of occurrences:  Unable to specify    Severity:  Mild    Duration:  12 hours    Timing:  Intermittent    Progression:  Unchanged  Risk factors: being elderly and obesity        Prior to Admission Medications   Prescriptions Last Dose Informant Patient Reported? Taking?    cholecalciferol (VITAMIN D3) 1,000 units tablet   Yes Yes   Sig: Take 2,000 Units by mouth daily lisinopril (ZESTRIL) 20 mg tablet   Yes Yes   Sig: Take 20 mg by mouth 2 (two) times a day   metoprolol tartrate (LOPRESSOR) 100 mg tablet   Yes Yes   Sig: Take 25 mg by mouth every 12 (twelve) hours   rosuvastatin (CRESTOR) 10 MG tablet   Yes Yes   Sig: Take 10 mg by mouth daily      Facility-Administered Medications: None       Past Medical History:   Diagnosis Date    Anxiety     Colon polyps     Hyperlipidemia     Hypertension        Past Surgical History:   Procedure Laterality Date    BREAST BIOPSY Right     COLONOSCOPY      NO PAST SURGERIES      RECTAL BIOPSY N/A 2/9/2018    Procedure: TRANSANAL EXCISION RECTAL POLYP;MUCOSAL POLYPECTOMY;  Surgeon: Maryam Arana MD;  Location[facility];  Service: Colorectal       Family History   Problem Relation Age of Onset    Hypertension Mother     Heart disease Father     No Known Problems Daughter     No Known Problems Maternal Grandmother     No Known Problems Maternal Grandfather     No Known Problems Paternal Grandmother     No Known Problems Paternal Grandfather      I have reviewed and agree with the history as documented  Social History     Tobacco Use    Smoking status: Former Smoker    Smokeless tobacco: Never Used    Tobacco comment: 30  YEARS AGO    Substance Use Topics    Alcohol use: Yes     Alcohol/week: 1 0 standard drinks     Types: 1 Glasses of wine per week    Drug use: Not on file        Review of Systems   Constitutional: Negative for chills, fatigue and fever  HENT: Negative for sore throat  Eyes: Negative  Respiratory: Negative for cough and shortness of breath  Cardiovascular: Negative for chest pain  Gastrointestinal: Positive for abdominal pain, blood in stool, diarrhea and hematochezia  Negative for anorexia, constipation, flatus, hematemesis, melena, nausea and vomiting  Endocrine: Negative  Genitourinary: Negative for dysuria, hematuria, vaginal bleeding and vaginal discharge     Musculoskeletal: Negative  Skin: Negative  Allergic/Immunologic: Negative  Neurological: Negative  Hematological: Negative  Psychiatric/Behavioral: Negative  Physical Exam  Physical Exam   Constitutional: She is oriented to person, place, and time  She appears well-developed and well-nourished  Non-toxic appearance  She does not appear ill  No distress  HENT:   Head: Normocephalic and atraumatic  Mouth/Throat: No oropharyngeal exudate  Eyes: Pupils are equal, round, and reactive to light  EOM are normal  No scleral icterus  Cardiovascular: Normal rate, regular rhythm, normal heart sounds and intact distal pulses  Exam reveals no gallop and no friction rub  No murmur heard  Pulmonary/Chest: Effort normal and breath sounds normal  No stridor  No respiratory distress  Abdominal: Soft  Normal appearance and bowel sounds are normal  She exhibits no distension, no pulsatile midline mass and no mass  There is no tenderness  Genitourinary: Rectal exam shows guaiac positive stool  Rectal exam shows no mass and no tenderness  Neurological: She is alert and oriented to person, place, and time  Skin: Skin is warm and dry  No rash noted  She is not diaphoretic  No cyanosis or erythema  No pallor  Psychiatric: She has a normal mood and affect  Her behavior is normal    Nursing note and vitals reviewed        Vital Signs  ED Triage Vitals   Temperature Pulse Respirations Blood Pressure SpO2   10/05/19 0739 10/05/19 0739 10/05/19 0739 10/05/19 0740 10/05/19 0739   98 3 °F (36 8 °C) (!) 113 20 170/97 97 %      Temp Source Heart Rate Source Patient Position - Orthostatic VS BP Location FiO2 (%)   10/05/19 0830 10/05/19 0830 -- 10/05/19 0900 --   Temporal Monitor  Right arm       Pain Score       10/05/19 0830       No Pain           Vitals:    10/05/19 0830 10/05/19 0900 10/05/19 0930 10/05/19 1115   BP: 128/75 152/72 145/74 154/91   Pulse: 59 60 62 69         Visual Acuity      ED Medications  Medications metroNIDAZOLE (FLAGYL) tablet 500 mg (has no administration in time range)   ciprofloxacin (CIPRO) tablet 500 mg (has no administration in time range)   sodium chloride 0 9 % bolus 1,000 mL (0 mL Intravenous Stopped 10/5/19 1000)   pantoprazole (PROTONIX) injection 40 mg (40 mg Intravenous Given 10/5/19 0820)   iohexol (OMNIPAQUE) 240 MG/ML solution 50 mL (50 mL Oral Given 10/5/19 0803)   iohexol (OMNIPAQUE) 350 MG/ML injection (MULTI-DOSE) 100 mL (100 mL Intravenous Given 10/5/19 1028)       Diagnostic Studies  Results Reviewed     Procedure Component Value Units Date/Time    Troponin I [[id number]] Collected:  10/05/19 1119    Lab Status: In process Specimen:  Blood from Arm, Right Updated:  10/05/19 1121    Howard draw [[id number]] Collected:  10/05/19 0800    Lab Status:  Final result Specimen:  Blood from Arm, Left Updated:  10/05/19 4243    Narrative: The following orders were created for panel order Howard draw  Procedure                               Abnormality         Status                     ---------                               -----------         ------                     Dominick Cost top on BRAH[[id number]]                                 Final result               Green / Black tube on SQXI[[id number]]                       Final result               Lavender Top 3 ml on BAGW[[id number]]                        Final result               Lavender Top 7ml on BVZF[[id number]]                         Final result                 Please view results for these tests on the individual orders      Urine Microscopic [[id number]]  (Abnormal) Collected:  10/05/19 0811    Lab Status:  Final result Specimen:  Urine, Clean Catch Updated:  10/05/19 0841     RBC, UA 2-4 /hpf      WBC, UA 2-4 /hpf      Epithelial Cells Moderate /hpf      Bacteria, UA Occasional /hpf      Hyaline Casts, UA 0-1 /lpf      Ca Oxalate Zulay, UA Occasional /hpf      MUCUS THREADS Moderate    UA w Reflex to Microscopic w Reflex to Culture [[id number]]  (Abnormal) Collected:  10/05/19 0811    Lab Status:  Final result Specimen:  Urine, Clean Catch Updated:  10/05/19 0840     Color, UA Yellow     Clarity, UA Clear     Specific Gravity, UA 1 025     pH, UA 6 0     Leukocytes, UA Negative     Nitrite, UA Negative     Protein, UA 1+ mg/dl      Glucose, UA Negative mg/dl      Ketones, UA Negative mg/dl      Urobilinogen, UA 0 2 E U /dl      Bilirubin, UA 1+     Blood, UA Trace-Intact    Lipase [[id number]]  (Normal) Collected:  10/05/19 0757    Lab Status:  Final result Specimen:  Blood from Arm, Left Updated:  10/05/19 0832     Lipase 26 u/L     Troponin I [[id number]]  (Normal) Collected:  10/05/19 0757    Lab Status:  Final result Specimen:  Blood from Arm, Left Updated:  10/05/19 0832     Troponin I <0 03 ng/mL     Protime-INR [[id number]]  (Normal) Collected:  10/05/19 0757    Lab Status:  Final result Specimen:  Blood from Arm, Left Updated:  10/05/19 0831     Protime 11 3 seconds      INR 0 97    Comprehensive metabolic panel [[id number]]  (Abnormal) Collected:  10/05/19 0757    Lab Status:  Final result Specimen:  Blood from Arm, Left Updated:  10/05/19 0831     Sodium 127 mmol/L      Potassium 3 6 mmol/L      Chloride 93 mmol/L      CO2 25 mmol/L      ANION GAP 9 mmol/L      BUN 19 mg/dL      Creatinine 1 10 mg/dL      Glucose 132 mg/dL      Calcium 9 6 mg/dL      AST 17 U/L      ALT 15 U/L      Alkaline Phosphatase 51 U/L      Total Protein 7 8 g/dL      Albumin 4 5 g/dL      Total Bilirubin 0 40 mg/dL      eGFR 52 ml/min/1 73sq m     Narrative:       [facility] guidelines for Chronic Kidney Disease (CKD):     Stage 1 with normal or high GFR (GFR > 90 mL/min/1 73 square meters)    Stage 2 Mild CKD (GFR = 60-89 mL/min/1 73 square meters)    Stage 3A Moderate CKD (GFR = 45-59 mL/min/1 73 square meters)    Stage 3B Moderate CKD (GFR = 30-44 mL/min/1 73 square meters)    Stage 4 Severe CKD (GFR = 15-29 mL/min/1 73 square meters)    Stage 5 End Stage CKD (GFR <15 mL/min/1 73 square meters)  Note: GFR calculation is accurate only with a steady state creatinine    APTT [[id number]]  (Normal) Collected:  10/05/19 0757    Lab Status:  Final result Specimen:  Blood from Arm, Left Updated:  10/05/19 0831     PTT 27 seconds     CBC and differential [[id number]]  (Abnormal) Collected:  10/05/19 0757    Lab Status:  Final result Specimen:  Blood from Arm, Left Updated:  10/05/19 0813     WBC 13 30 Thousand/uL      RBC 4 39 Million/uL      Hemoglobin 13 2 g/dL      Hematocrit 38 5 %      MCV 88 fL      MCH 30 2 pg      MCHC 34 4 g/dL      RDW 12 8 %      MPV 7 1 fL      Platelets 217 Thousands/uL      Neutrophils Relative 84 %      Lymphocytes Relative 11 %      Monocytes Relative 4 %      Eosinophils Relative 0 %      Basophils Relative 1 %      Neutrophils Absolute 11 20 Thousands/µL      Lymphocytes Absolute 1 50 Thousands/µL      Monocytes Absolute 0 50 Thousand/µL      Eosinophils Absolute 0 00 Thousand/µL      Basophils Absolute 0 10 Thousands/µL     POCT occult blood stool [[id number]]  (Abnormal) Resulted:  10/05/19 0807    Lab Status:  Final result Specimen:  Stool Updated:  10/05/19 0807     EXT Fecal Occult Blood (Ref: Negative) positive     Control valid                 CT abdomen pelvis with contrast   Final Result by Heydi Cueva MD (10/05 1052)      1  Colitis involving the descending colon  2   15 mm right adrenal nodule  Although its imaging features are indeterminate, it does not have suspicious imaging features (heterogeneity, necrosis, irregular margins), therefore this is likely benign, and can be followed by non-contrast abdomen CT    or MRI in 12 months  If patient has history of adrenal hyperfunction, consider biochemical evaluation  Adrenal recommendation based on institutional consensus and Journal of [employer] of Radiology 2017;14:1749-7736      3    Right psoas intramuscular cyst measuring 8 x 3 x 2 9 cm centered at the level of the inguinal region, of uncertain etiology and clinical significance  Nonemergent ultrasound is recommended for further evaluation  The study was marked in [facility] for immediate notification  Workstation performed: [id number]                    Procedures  ECG 12 Lead Documentation Only  Date/Time: 10/5/2019 8:19 AM  Performed by: Lorna Solis MD  Authorized by: Lorna Solis MD     Indications / Diagnosis:  Abdominal pain  ECG reviewed by me, the ED Provider: yes    Patient location:  ED  Previous ECG:     Previous ECG:  Unavailable    Comparison to cardiac monitor: Yes    Interpretation:     Interpretation: normal    Rate:     ECG rate:  65 beats per minute    ECG rate assessment: normal    Rhythm:     Rhythm: sinus rhythm    Ectopy:     Ectopy: none    QRS:     QRS axis:  Normal    QRS intervals:  Normal  Conduction:     Conduction: normal    ST segments:     ST segments:  Normal  T waves:     T waves: normal             ED Course  ED Course as of Oct 05 1131   Sat Oct 05, 2019   1127 Patient is resting comfortably on the stretcher  I discussed with her the results of her blood work, urinalysis, EKG, chest x-ray and CT scan of the abdomen and pelvis with oral and IV contrast   I discussed with her the incidental findings right adrenal nodule and cyst in the abdominal muscle  I advised her to follow up with her family doctor regarding her adrenal nodule which is about 15 mm in size  Patient agreed  I informed the patient that she will be given a prescription for Flagyl 500 mg to be taken 3 times a day for 1 week                                    MDM  Number of Diagnoses or Management Options  Colitis: new and requires workup     Amount and/or Complexity of Data Reviewed  Clinical lab tests: ordered and reviewed  Tests in the radiology section of CPT®: ordered and reviewed  Tests in the medicine section of CPT®: ordered and reviewed  Decide to obtain previous medical records or to obtain history from someone other than the patient: yes  Obtain history from someone other than the patient: yes  Review and summarize past medical records: yes  Independent visualization of images, tracings, or specimens: yes    Risk of Complications, Morbidity, and/or Mortality  Presenting problems: moderate  Diagnostic procedures: moderate  Management options: moderate    Patient Progress  Patient progress: improved      Disposition  Final diagnoses:   Colitis     Time reflects when diagnosis was documented in both MDM as applicable and the Disposition within this note     Time User Action Codes Description Comment    10/5/2019 11:28 AM Rico Grajedan Add [K52 9] Colitis       ED Disposition     ED Disposition Condition Date/Time Comment    Discharge Stable Sat Oct 5, 2019 11:28 AM Stanislav Gladis discharge to home/self care  Follow-up Information     Follow up With Specialties Details Why Gissell Pinon MD Nephrology In 3 days  [address] [place] [address]  [phone]            Patient's Medications   Discharge Prescriptions    CIPROFLOXACIN (CIPRO) 500 MG TABLET    Take 1 tablet (500 mg total) by mouth 2 (two) times a day for 7 days       Start Date: 10/5/2019 End Date: 10/12/2019       Order Dose: 500 mg       Quantity: 14 tablet    Refills: 0    METRONIDAZOLE (FLAGYL) 500 MG TABLET    Take 1 tablet (500 mg total) by mouth every 8 (eight) hours for 7 days       Start Date: 10/5/2019 End Date: 10/12/2019       Order Dose: 500 mg       Quantity: 21 tablet    Refills: 0     No discharge procedures on file      ED Provider  Electronically Signed by           Alexander Katz MD  10/05/19 4700

## 2019-10-07 ENCOUNTER — TELEPHONE (OUTPATIENT)
Dept: NEPHROLOGY | Facility: CLINIC | Age: 69
End: 2019-10-07

## 2019-10-07 NOTE — TELEPHONE ENCOUNTER
Patient called she states that she was in the ER with terrible pain in the stomach/diarrhea/blood in stool  She was diagnosed with Colitis  They gave her antibiotics and told her to follow up with her primary care doctor

## 2019-10-21 ENCOUNTER — OFFICE VISIT (OUTPATIENT)
Dept: NEPHROLOGY | Facility: CLINIC | Age: 69
End: 2019-10-21
Payer: COMMERCIAL

## 2019-10-21 VITALS
HEIGHT: 68 IN | DIASTOLIC BLOOD PRESSURE: 78 MMHG | BODY MASS INDEX: 33.28 KG/M2 | HEART RATE: 78 BPM | WEIGHT: 219.6 LBS | SYSTOLIC BLOOD PRESSURE: 138 MMHG

## 2019-10-21 DIAGNOSIS — K52.9 COLITIS: ICD-10-CM

## 2019-10-21 DIAGNOSIS — I10 ESSENTIAL HYPERTENSION: Primary | ICD-10-CM

## 2019-10-21 DIAGNOSIS — E78.00 PURE HYPERCHOLESTEROLEMIA: ICD-10-CM

## 2019-10-21 DIAGNOSIS — N18.30 STAGE 3 CHRONIC KIDNEY DISEASE (HCC): ICD-10-CM

## 2019-10-21 DIAGNOSIS — E87.1 HYPONATREMIA: ICD-10-CM

## 2019-10-21 DIAGNOSIS — Z23 ENCOUNTER FOR ADMINISTRATION OF VACCINE: ICD-10-CM

## 2019-10-21 PROCEDURE — 3078F DIAST BP <80 MM HG: CPT | Performed by: INTERNAL MEDICINE

## 2019-10-21 PROCEDURE — G0008 ADMIN INFLUENZA VIRUS VAC: HCPCS

## 2019-10-21 PROCEDURE — 99214 OFFICE O/P EST MOD 30 MIN: CPT | Performed by: INTERNAL MEDICINE

## 2019-10-21 PROCEDURE — 90662 IIV NO PRSV INCREASED AG IM: CPT

## 2019-10-21 PROCEDURE — 3075F SYST BP GE 130 - 139MM HG: CPT | Performed by: INTERNAL MEDICINE

## 2019-10-21 RX ORDER — ROSUVASTATIN CALCIUM 20 MG/1
20 TABLET, COATED ORAL
Refills: 4 | COMMUNITY
Start: 2019-08-26 | End: 2020-06-22 | Stop reason: SDUPTHER

## 2019-10-21 RX ORDER — HYDROCHLOROTHIAZIDE 25 MG/1
25 TABLET ORAL DAILY
COMMUNITY
Start: 2019-10-12 | End: 2019-10-21

## 2019-10-21 RX ORDER — AMLODIPINE BESYLATE 5 MG/1
5 TABLET ORAL DAILY
COMMUNITY
Start: 2019-10-12 | End: 2020-02-24 | Stop reason: DRUGHIGH

## 2019-10-21 NOTE — PROGRESS NOTES
[address] [facility], [place]    Name: Kita Melton  YOB: 1950      Assessment/Plan:    Colitis  Has resolved    Pure hypercholesterolemia  Cholesterol well controlled on rosuvastatin         Problem List Items Addressed This Visit        Digestive    Colitis     Has resolved            Other    Hyponatremia    Pure hypercholesterolemia     Cholesterol well controlled on rosuvastatin         Relevant Medications    rosuvastatin (CRESTOR) 20 MG tablet    Encounter for administration of vaccine      Other Visit Diagnoses     Essential hypertension    -  Primary    Relevant Medications    amLODIPine (NORVASC) 5 mg tablet    Stage 3 chronic kidney disease ([address] )                Subjective:      Patient ID: Kita Melton is a 77 y o  female  Went to ED 2 weeks ago with lower abdominal pain and diarrhea  Then the diarrhea was grossly bloody  By morning she couldn't stand it so went to the ED  A CT suggested colitis  They gave her antibiotics for 7 days  A CT showed colitis involving the descending colon  She has a 15 mm right adrenal nodule  This is considered benign by appearance but they recommend a follow up MRI or noncontrast CT in 12 months  She feels fine  Was given ciprofloxacin and metronidazole for 7 days  She had no recent travel or unusual food  She has a colonoscopy appt on November 8th    She has a history of CKD 3 and is voiding well  She avoids NSAID's    The following portions of the patient's history were reviewed and updated as appropriate: allergies, current medications, past family history, past medical history, past social history, past surgical history and problem list     Review of Systems   Constitutional: Negative for activity change, appetite change, fatigue and unexpected weight change  HENT: Negative for congestion, ear discharge, ear pain, trouble swallowing and voice change      Eyes: Negative for pain, discharge and visual disturbance  Respiratory: Negative for cough, chest tightness, shortness of breath and wheezing  Cardiovascular: Negative for chest pain, palpitations and leg swelling  Gastrointestinal: Negative for abdominal pain, blood in stool, constipation, diarrhea, nausea and vomiting  Endocrine: Negative for heat intolerance, polydipsia, polyphagia and polyuria  Genitourinary: Negative for decreased urine volume, difficulty urinating, frequency and urgency  Musculoskeletal: Negative for arthralgias, back pain and gait problem  Skin: Negative for color change and rash  Neurological: Negative for dizziness, weakness and headaches  Social History     Socioeconomic History    Marital status: /Civil Union     Spouse name: None    Number of children: None    Years of education: None    Highest education level: None   Occupational History    None   Social Needs    Financial resource strain: None    Food insecurity:     Worry: None     Inability: None    Transportation needs:     Medical: None     Non-medical: None   Tobacco Use    Smoking status: Former Smoker    Smokeless tobacco: Never Used    Tobacco comment: 30  YEARS AGO    Substance and Sexual Activity    Alcohol use:  Yes     Alcohol/week: 1 0 standard drinks     Types: 1 Glasses of wine per week    Drug use: Never    Sexual activity: None   Lifestyle    Physical activity:     Days per week: None     Minutes per session: None    Stress: None   Relationships    Social connections:     Talks on phone: None     Gets together: None     Attends Y[employer] service: None     Active member of club or organization: None     Attends meetings of clubs or organizations: None     Relationship status: None    Intimate partner violence:     Fear of current or ex partner: None     Emotionally abused: None     Physically abused: None     Forced sexual activity: None   Other Topics Concern    None   Social History Narrative    None     Past Medical History:   Diagnosis Date    Abnormal weight gain     Anxiety     Benign essential hypertension     Chronic kidney disease, stage 3 (HCC)     Colon polyps     Essential hypertension     Hematuria syndrome     Hyperlipidemia     Hypertension     Morbid obesity ([address] )     Osteoarthritis     Proteinuria     Vitamin D deficiency      Past Surgical History:   Procedure Laterality Date    BREAST BIOPSY Right     COLONOSCOPY      COLONOSCOPY  12/2018    3 polyps     RECTAL BIOPSY N/A 2/9/2018    Procedure: TRANSANAL EXCISION RECTAL POLYP;MUCOSAL POLYPECTOMY;  Surgeon: Jeffery Lilly MD;  Location: [facility];  Service: Colorectal       Current Outpatient Medications:     amLODIPine (NORVASC) 5 mg tablet, Take 5 mg by mouth daily, Disp: , Rfl:     cholecalciferol (VITAMIN D3) 1,000 units tablet, Take 2,000 Units by mouth daily, Disp: , Rfl:     lisinopril (ZESTRIL) 20 mg tablet, Take 20 mg by mouth 2 (two) times a day, Disp: , Rfl:     metoprolol tartrate (LOPRESSOR) 100 mg tablet, Take 25 mg by mouth every 12 (twelve) hours, Disp: , Rfl:     rosuvastatin (CRESTOR) 20 MG tablet, Take 20 mg by mouth daily, Disp: , Rfl: 4    Lab Results   Component Value Date     (L) 05/30/2018    SODIUM 127 (L) 10/05/2019    K 3 6 10/05/2019    CL 93 (L) 10/05/2019    CO2 25 10/05/2019    ANIONGAP 13 1 05/30/2018    AGAP 9 10/05/2019    BUN 19 10/05/2019    CREATININE 1 10 10/05/2019    GLUC 132 (H) 10/05/2019    GLUF 101 (H) 05/16/2019    CALCIUM 9 6 10/05/2019    AST 17 10/05/2019    ALT 15 10/05/2019    ALKPHOS 51 (L) 10/05/2019    PROT 7 8 05/04/2018    TP 7 8 10/05/2019    BILITOT 0 7 05/04/2018    TBILI 0 40 10/05/2019    EGFR 52 10/05/2019     Lab Results   Component Value Date    WBC 13 30 (H) 10/05/2019    HGB 13 2 10/05/2019    HCT 38 5 (L) 10/05/2019    MCV 88 10/05/2019     (H) 10/05/2019     Lab Results   Component Value Date    CHOLESTEROL 150 05/16/2019     Lab Results   Component Value Date    HDL 47 05/16/2019    HDL 41 05/04/2018     Lab Results   Component Value Date    LDLCALC 63 05/16/2019    LDLCALC 189 6 05/04/2018     Lab Results   Component Value Date    TRIG 198 (H) 05/16/2019    TRIG 221 (H) 05/04/2018     No results found for: CHOLHDL  No results found for: [id number], TSH  Lab Results   Component Value Date    CALCIUM 9 6 10/05/2019     No results found for: SPEP, UPEP  No results found for: MICROALBUR, [id number]        Objective:      /78 (BP Location: Left arm, Patient Position: Sitting, Cuff Size: Standard)   Pulse 78   Ht 5' 8" (1 727 m)   Wt 99 6 kg (219 lb 9 6 oz)   BMI 33 39 kg/m²          Physical Exam   Constitutional: She is oriented to person, place, and time  She appears well-developed and well-nourished  No distress  HENT:   Head: Normocephalic  Right Ear: External ear normal    Left Ear: External ear normal    Nose: Nose normal    Mouth/Throat: Oropharynx is clear and moist    Eyes: Pupils are equal, round, and reactive to light  Conjunctivae are normal    Cardiovascular: Normal rate, regular rhythm and normal heart sounds  No murmur heard  Pulmonary/Chest: Effort normal and breath sounds normal  No respiratory distress  She has no wheezes  She has no rales  Abdominal: Soft  Bowel sounds are normal  She exhibits no distension  There is no tenderness  There is no rebound and no guarding  Musculoskeletal: Normal range of motion  She exhibits no edema  Neurological: She is alert and oriented to person, place, and time  Skin: Skin is warm and dry  Capillary refill takes less than 2 seconds  She is not diaphoretic  Psychiatric: She has a normal mood and affect

## 2019-10-28 ENCOUNTER — APPOINTMENT (OUTPATIENT)
Dept: LAB | Facility: HOSPITAL | Age: 69
End: 2019-10-28
Attending: INTERNAL MEDICINE
Payer: COMMERCIAL

## 2019-10-28 DIAGNOSIS — E87.1 HYPONATREMIA: ICD-10-CM

## 2019-10-28 DIAGNOSIS — K52.9 COLITIS: ICD-10-CM

## 2019-10-28 LAB
ALBUMIN SERPL BCP-MCNC: 3.9 G/DL (ref 3.5–5.7)
ALP SERPL-CCNC: 40 U/L (ref 55–165)
ALT SERPL W P-5'-P-CCNC: 16 U/L (ref 7–52)
ANION GAP SERPL CALCULATED.3IONS-SCNC: 6 MMOL/L (ref 4–13)
AST SERPL W P-5'-P-CCNC: 14 U/L (ref 13–39)
BASOPHILS # BLD AUTO: 0.1 THOUSANDS/ΜL (ref 0–0.1)
BASOPHILS NFR BLD AUTO: 1 % (ref 0–2)
BILIRUB SERPL-MCNC: 0.3 MG/DL (ref 0.2–1)
BUN SERPL-MCNC: 16 MG/DL (ref 7–25)
CALCIUM SERPL-MCNC: 8.9 MG/DL (ref 8.6–10.5)
CHLORIDE SERPL-SCNC: 102 MMOL/L (ref 98–107)
CO2 SERPL-SCNC: 28 MMOL/L (ref 21–31)
CREAT SERPL-MCNC: 1.14 MG/DL (ref 0.6–1.2)
EOSINOPHIL # BLD AUTO: 0.2 THOUSAND/ΜL (ref 0–0.61)
EOSINOPHIL NFR BLD AUTO: 3 % (ref 0–5)
ERYTHROCYTE [DISTWIDTH] IN BLOOD BY AUTOMATED COUNT: 13.2 % (ref 11.5–14.5)
GFR SERPL CREATININE-BSD FRML MDRD: 50 ML/MIN/1.73SQ M
GLUCOSE P FAST SERPL-MCNC: 88 MG/DL (ref 65–99)
HCT VFR BLD AUTO: 33.3 % (ref 42–47)
HGB BLD-MCNC: 11.4 G/DL (ref 12–16)
LYMPHOCYTES # BLD AUTO: 2.3 THOUSANDS/ΜL (ref 0.6–4.47)
LYMPHOCYTES NFR BLD AUTO: 36 % (ref 21–51)
MCH RBC QN AUTO: 30.6 PG (ref 26–34)
MCHC RBC AUTO-ENTMCNC: 34.3 G/DL (ref 31–37)
MCV RBC AUTO: 89 FL (ref 81–99)
MONOCYTES # BLD AUTO: 0.6 THOUSAND/ΜL (ref 0.17–1.22)
MONOCYTES NFR BLD AUTO: 9 % (ref 2–12)
NEUTROPHILS # BLD AUTO: 3.3 THOUSANDS/ΜL (ref 1.4–6.5)
NEUTS SEG NFR BLD AUTO: 51 % (ref 42–75)
PLATELET # BLD AUTO: 397 THOUSANDS/UL (ref 149–390)
PMV BLD AUTO: 6.6 FL (ref 8.6–11.7)
POTASSIUM SERPL-SCNC: 4.1 MMOL/L (ref 3.5–5.5)
PROT SERPL-MCNC: 6.7 G/DL (ref 6.4–8.9)
RBC # BLD AUTO: 3.73 MILLION/UL (ref 3.9–5.2)
SODIUM SERPL-SCNC: 136 MMOL/L (ref 134–143)
WBC # BLD AUTO: 6.4 THOUSAND/UL (ref 4.8–10.8)

## 2019-10-28 PROCEDURE — 36415 COLL VENOUS BLD VENIPUNCTURE: CPT

## 2019-10-28 PROCEDURE — 85025 COMPLETE CBC W/AUTO DIFF WBC: CPT

## 2019-10-28 PROCEDURE — 80053 COMPREHEN METABOLIC PANEL: CPT

## 2020-02-24 ENCOUNTER — OFFICE VISIT (OUTPATIENT)
Dept: NEPHROLOGY | Facility: CLINIC | Age: 70
End: 2020-02-24
Payer: COMMERCIAL

## 2020-02-24 VITALS
WEIGHT: 224 LBS | OXYGEN SATURATION: 98 % | DIASTOLIC BLOOD PRESSURE: 120 MMHG | BODY MASS INDEX: 33.95 KG/M2 | SYSTOLIC BLOOD PRESSURE: 208 MMHG | HEART RATE: 80 BPM | HEIGHT: 68 IN

## 2020-02-24 DIAGNOSIS — K52.9 COLITIS: ICD-10-CM

## 2020-02-24 DIAGNOSIS — I16.0 HYPERTENSIVE URGENCY: ICD-10-CM

## 2020-02-24 DIAGNOSIS — E78.00 PURE HYPERCHOLESTEROLEMIA: ICD-10-CM

## 2020-02-24 DIAGNOSIS — C20 RECTAL CANCER (HCC): ICD-10-CM

## 2020-02-24 DIAGNOSIS — I10 ESSENTIAL HYPERTENSION: Primary | ICD-10-CM

## 2020-02-24 PROBLEM — E87.1 HYPONATREMIA: Status: RESOLVED | Noted: 2019-10-21 | Resolved: 2020-02-24

## 2020-02-24 PROCEDURE — 99214 OFFICE O/P EST MOD 30 MIN: CPT | Performed by: INTERNAL MEDICINE

## 2020-02-24 RX ORDER — AMLODIPINE BESYLATE 10 MG/1
10 TABLET ORAL DAILY
Qty: 90 TABLET | Refills: 3 | Status: SHIPPED | OUTPATIENT
Start: 2020-02-24 | End: 2020-05-12 | Stop reason: ALTCHOICE

## 2020-02-24 RX ORDER — METOPROLOL TARTRATE 100 MG/1
TABLET ORAL
Qty: 240 TABLET | Refills: 3 | Status: SHIPPED | OUTPATIENT
Start: 2020-02-24 | End: 2020-06-22 | Stop reason: SDUPTHER

## 2020-02-24 RX ORDER — HYDRALAZINE HYDROCHLORIDE 25 MG/1
25 TABLET, FILM COATED ORAL 3 TIMES DAILY
Qty: 60 TABLET | Refills: 3 | Status: SHIPPED | OUTPATIENT
Start: 2020-02-24 | End: 2020-02-26

## 2020-02-24 NOTE — PATIENT INSTRUCTIONS
Take an additional amlodipine 5mg and 50mg of metoprolol on arrival home  Return this week with your cuff  Have labs drawn tomorrow

## 2020-02-24 NOTE — PROGRESS NOTES
[address] [facility] [place], [place]    Name: Madeline Medina  YOB: 1950      Assessment/Plan:    Blood presure is markedly elevated today  She will take an additional amlopdine 5mg on return home  She will start hydrlazine 25mg twice a day  Increase metoprolol to 150 mg in the morning and 100mg at night  She will take an additional 50mg on return home  REturn this week with blood pressure cuff       Problem List Items Addressed This Visit        Digestive    Rectal cancer ([address] )    Colitis       Other    Pure hypercholesterolemia      Other Visit Diagnoses     Essential hypertension    -  Primary    Relevant Medications    metoprolol tartrate (LOPRESSOR) 100 mg tablet    amLODIPine (NORVASC) 10 mg tablet    hydrALAZINE (APRESOLINE) 25 mg tablet            Subjective:      Patient ID: Madeline Medina is a 77 y o  female  HPI  Has accelerated hypertension but says her home cuff was 138/72  She is asymptomatic  She was worked up this morning as she thought she lost her wallet  Has CKD 3A with a GFR of 50 ml/min  Voids well  No NSAID's or other OTC meds    Colonoscopy Jan 2020 - negative    The following portions of the patient's history were reviewed and updated as appropriate: allergies, current medications, past family history, past medical history, past social history, past surgical history and problem list     Review of Systems   Constitutional: Negative for activity change, appetite change, fatigue and unexpected weight change  HENT: Negative for congestion, ear discharge, ear pain, trouble swallowing and voice change  Occasionally feels noise in her ears   Eyes: Negative for pain, discharge and visual disturbance  Respiratory: Negative for cough, chest tightness, shortness of breath and wheezing  Cardiovascular: Negative for chest pain, palpitations and leg swelling     Gastrointestinal: Negative for abdominal pain, blood in stool, constipation, diarrhea, nausea and vomiting  Endocrine: Negative for heat intolerance, polydipsia, polyphagia and polyuria  Genitourinary: Negative for decreased urine volume, difficulty urinating, frequency and urgency  Musculoskeletal: Negative for arthralgias, back pain and gait problem  Skin: Negative for color change and rash  Neurological: Negative for dizziness, weakness and headaches  Social History     Socioeconomic History    Marital status: /Civil Union     Spouse name: None    Number of children: None    Years of education: None    Highest education level: None   Occupational History    None   Social Needs    Financial resource strain: None    Food insecurity:     Worry: None     Inability: None    Transportation needs:     Medical: None     Non-medical: None   Tobacco Use    Smoking status: Former Smoker    Smokeless tobacco: Never Used    Tobacco comment: 30  YEARS AGO    Substance and Sexual Activity    Alcohol use:  Yes     Alcohol/week: 1 0 standard drinks     Types: 1 Glasses of wine per week    Drug use: Never    Sexual activity: None   Lifestyle    Physical activity:     Days per week: None     Minutes per session: None    Stress: None   Relationships    Social connections:     Talks on phone: None     Gets together: None     Attends Worship service: None     Active member of club or organization: None     Attends meetings of clubs or organizations: None     Relationship status: None    Intimate partner violence:     Fear of current or ex partner: None     Emotionally abused: None     Physically abused: None     Forced sexual activity: None   Other Topics Concern    None   Social History Narrative    None     Past Medical History:   Diagnosis Date    Abnormal weight gain     Anxiety     Benign essential hypertension     Chronic kidney disease, stage 3 ([address] )     Colon polyps     Essential hypertension     Hematuria syndrome     Hyperlipidemia     Hypertension     Morbid obesity [facility] [facility] )     Osteoarthritis     Proteinuria     Vitamin D deficiency      Past Surgical History:   Procedure Laterality Date    BREAST BIOPSY Right     COLONOSCOPY      COLONOSCOPY  12/2018    3 polyps     RECTAL BIOPSY N/A 2/9/2018    Procedure: TRANSANAL EXCISION RECTAL POLYP;MUCOSAL POLYPECTOMY;  Surgeon: Jacklyn Martinez MD;  [facility];  Service: Colorectal       Current Outpatient Medications:     cholecalciferol (VITAMIN D3) 1,000 units tablet, Take 2,000 Units by mouth daily, Disp: , Rfl:     lisinopril (ZESTRIL) 20 mg tablet, Take 20 mg by mouth 2 (two) times a day, Disp: , Rfl:     rosuvastatin (CRESTOR) 20 MG tablet, Take 20 mg by mouth every other day , Disp: , Rfl: 4    amLODIPine (NORVASC) 10 mg tablet, Take 1 tablet (10 mg total) by mouth daily, Disp: 90 tablet, Rfl: 3    hydrALAZINE (APRESOLINE) 25 mg tablet, Take 1 tablet (25 mg total) by mouth 3 (three) times a day, Disp: 60 tablet, Rfl: 3    metoprolol tartrate (LOPRESSOR) 100 mg tablet, 1 1/2 in the morning and 1 at night, Disp: 240 tablet, Rfl: 3    Lab Results   Component Value Date     (L) 05/30/2018    SODIUM 136 10/28/2019    K 4 1 10/28/2019     10/28/2019    CO2 28 10/28/2019    ANIONGAP 13 1 05/30/2018    AGAP 6 10/28/2019    BUN 16 10/28/2019    CREATININE 1 14 10/28/2019    GLUC 132 (H) 10/05/2019    GLUF 88 10/28/2019    CALCIUM 8 9 10/28/2019    AST 14 10/28/2019    ALT 16 10/28/2019    ALKPHOS 40 (L) 10/28/2019    PROT 7 8 05/04/2018    TP 6 7 10/28/2019    BILITOT 0 7 05/04/2018    TBILI 0 30 10/28/2019    EGFR 50 10/28/2019     Lab Results   Component Value Date    WBC 6 40 10/28/2019    HGB 11 4 (L) 10/28/2019    HCT 33 3 (L) 10/28/2019    MCV 89 10/28/2019     (H) 10/28/2019     Lab Results   Component Value Date    CHOLESTEROL 150 05/16/2019     Lab Results   Component Value Date    HDL 47 05/16/2019    HDL 41 05/04/2018     Lab Results   Component Value Date    LDLCALC 63 05/16/2019    LDLCALC 189 6 05/04/2018     Lab Results   Component Value Date    TRIG 198 (H) 05/16/2019    TRIG 221 (H) 05/04/2018     No results found for: CHOLHDL  No results found for: EEL1YIPXGICH, TSH  Lab Results   Component Value Date    CALCIUM 8 9 10/28/2019     No results found for: SPEP, UPEP  No results found for: MICROALBUR, [id number]        Objective:      BP (!) 208/120 (BP Location: Left arm, Patient Position: Sitting, Cuff Size: Standard)   Pulse 80   Ht 5' 8" (1 727 m)   Wt 102 kg (224 lb)   SpO2 98%   BMI 34 06 kg/m²     220/120 end exam and took her meds this morning     Physical Exam   Constitutional: She is oriented to person, place, and time  She appears well-developed and well-nourished  No distress  HENT:   Head: Normocephalic  Right Ear: External ear normal    Left Ear: External ear normal    Nose: Nose normal    Mouth/Throat: Oropharynx is clear and moist    Eyes: Pupils are equal, round, and reactive to light  Conjunctivae are normal    Cardiovascular: Normal rate, regular rhythm and normal heart sounds  No murmur heard  Pulmonary/Chest: Effort normal and breath sounds normal  No respiratory distress  She has no wheezes  She has no rales  Abdominal: Soft  Bowel sounds are normal  She exhibits no distension  There is no tenderness  There is no rebound and no guarding  Musculoskeletal: Normal range of motion  She exhibits no edema  Neurological: She is alert and oriented to person, place, and time  Skin: Skin is warm and dry  Capillary refill takes less than 2 seconds  She is not diaphoretic  Psychiatric: She has a normal mood and affect

## 2020-02-25 ENCOUNTER — TRANSCRIBE ORDERS (OUTPATIENT)
Dept: LAB | Facility: HOSPITAL | Age: 70
End: 2020-02-25

## 2020-02-25 ENCOUNTER — APPOINTMENT (OUTPATIENT)
Dept: LAB | Facility: HOSPITAL | Age: 70
End: 2020-02-25
Attending: INTERNAL MEDICINE
Payer: COMMERCIAL

## 2020-02-25 DIAGNOSIS — E78.00 PURE HYPERCHOLESTEROLEMIA: ICD-10-CM

## 2020-02-25 DIAGNOSIS — I16.0 HYPERTENSIVE URGENCY: Primary | ICD-10-CM

## 2020-02-25 DIAGNOSIS — I16.0 HYPERTENSIVE URGENCY: ICD-10-CM

## 2020-02-25 LAB
ALBUMIN SERPL BCP-MCNC: 4.3 G/DL (ref 3.5–5.7)
ALP SERPL-CCNC: 47 U/L (ref 55–165)
ALT SERPL W P-5'-P-CCNC: 19 U/L (ref 7–52)
ANION GAP SERPL CALCULATED.3IONS-SCNC: 8 MMOL/L (ref 4–13)
AST SERPL W P-5'-P-CCNC: 17 U/L (ref 13–39)
BACTERIA UR QL AUTO: ABNORMAL /HPF
BASOPHILS # BLD AUTO: 0.1 THOUSANDS/ΜL (ref 0–0.1)
BASOPHILS NFR BLD AUTO: 1 % (ref 0–2)
BILIRUB SERPL-MCNC: 0.5 MG/DL (ref 0.2–1)
BILIRUB UR QL STRIP: NEGATIVE
BUN SERPL-MCNC: 12 MG/DL (ref 7–25)
CALCIUM SERPL-MCNC: 9.3 MG/DL (ref 8.6–10.5)
CHLORIDE SERPL-SCNC: 102 MMOL/L (ref 98–107)
CHOLEST SERPL-MCNC: 131 MG/DL (ref 0–200)
CLARITY UR: CLEAR
CO2 SERPL-SCNC: 29 MMOL/L (ref 21–31)
COLOR UR: YELLOW
CREAT SERPL-MCNC: 1.12 MG/DL (ref 0.6–1.2)
CREAT UR-MCNC: 144 MG/DL
CREAT UR-MCNC: 144 MG/DL
EOSINOPHIL # BLD AUTO: 0.1 THOUSAND/ΜL (ref 0–0.61)
EOSINOPHIL NFR BLD AUTO: 2 % (ref 0–5)
ERYTHROCYTE [DISTWIDTH] IN BLOOD BY AUTOMATED COUNT: 13.6 % (ref 11.5–14.5)
GFR SERPL CREATININE-BSD FRML MDRD: 51 ML/MIN/1.73SQ M
GLUCOSE P FAST SERPL-MCNC: 100 MG/DL (ref 65–99)
GLUCOSE UR STRIP-MCNC: NEGATIVE MG/DL
HCT VFR BLD AUTO: 38.5 % (ref 42–47)
HDLC SERPL-MCNC: 45 MG/DL
HGB BLD-MCNC: 12.9 G/DL (ref 12–16)
HGB UR QL STRIP.AUTO: ABNORMAL
KETONES UR STRIP-MCNC: NEGATIVE MG/DL
LDLC SERPL CALC-MCNC: 56 MG/DL (ref 0–100)
LEUKOCYTE ESTERASE UR QL STRIP: ABNORMAL
LYMPHOCYTES # BLD AUTO: 2.2 THOUSANDS/ΜL (ref 0.6–4.47)
LYMPHOCYTES NFR BLD AUTO: 36 % (ref 21–51)
MCH RBC QN AUTO: 30 PG (ref 26–34)
MCHC RBC AUTO-ENTMCNC: 33.4 G/DL (ref 31–37)
MCV RBC AUTO: 90 FL (ref 81–99)
MICROALBUMIN UR-MCNC: 15.8 MG/L (ref 0–20)
MICROALBUMIN/CREAT 24H UR: 11 MG/G CREATININE (ref 0–30)
MONOCYTES # BLD AUTO: 0.6 THOUSAND/ΜL (ref 0.17–1.22)
MONOCYTES NFR BLD AUTO: 10 % (ref 2–12)
NEUTROPHILS # BLD AUTO: 3.2 THOUSANDS/ΜL (ref 1.4–6.5)
NEUTS SEG NFR BLD AUTO: 51 % (ref 42–75)
NITRITE UR QL STRIP: NEGATIVE
NON-SQ EPI CELLS URNS QL MICRO: ABNORMAL /HPF
NONHDLC SERPL-MCNC: 86 MG/DL
PH UR STRIP.AUTO: 6 [PH]
PLATELET # BLD AUTO: 377 THOUSANDS/UL (ref 149–390)
PMV BLD AUTO: 7.9 FL (ref 8.6–11.7)
POTASSIUM SERPL-SCNC: 4.2 MMOL/L (ref 3.5–5.5)
PROT SERPL-MCNC: 7.1 G/DL (ref 6.4–8.9)
PROT UR STRIP-MCNC: NEGATIVE MG/DL
PROT UR-MCNC: 12 MG/DL
PROT/CREAT UR: 0.08 MG/G{CREAT} (ref 0–0.1)
RBC # BLD AUTO: 4.3 MILLION/UL (ref 3.9–5.2)
RBC #/AREA URNS AUTO: ABNORMAL /HPF
SODIUM SERPL-SCNC: 139 MMOL/L (ref 134–143)
SP GR UR STRIP.AUTO: 1.01 (ref 1–1.03)
T4 FREE SERPL-MCNC: 0.94 NG/DL (ref 0.76–1.46)
TRIGL SERPL-MCNC: 151 MG/DL (ref 44–166)
TSH SERPL DL<=0.05 MIU/L-ACNC: 3.95 UIU/ML (ref 0.45–5.33)
URATE SERPL-MCNC: 7.3 MG/DL (ref 2.3–7.6)
UROBILINOGEN UR QL STRIP.AUTO: 0.2 E.U./DL
WBC # BLD AUTO: 6.2 THOUSAND/UL (ref 4.8–10.8)
WBC #/AREA URNS AUTO: ABNORMAL /HPF

## 2020-02-25 PROCEDURE — 82570 ASSAY OF URINE CREATININE: CPT

## 2020-02-25 PROCEDURE — 84550 ASSAY OF BLOOD/URIC ACID: CPT

## 2020-02-25 PROCEDURE — 36415 COLL VENOUS BLD VENIPUNCTURE: CPT

## 2020-02-25 PROCEDURE — 84443 ASSAY THYROID STIM HORMONE: CPT

## 2020-02-25 PROCEDURE — 81001 URINALYSIS AUTO W/SCOPE: CPT

## 2020-02-25 PROCEDURE — 82384 ASSAY THREE CATECHOLAMINES: CPT

## 2020-02-25 PROCEDURE — 80061 LIPID PANEL: CPT

## 2020-02-25 PROCEDURE — 85025 COMPLETE CBC W/AUTO DIFF WBC: CPT

## 2020-02-25 PROCEDURE — 82043 UR ALBUMIN QUANTITATIVE: CPT

## 2020-02-25 PROCEDURE — 84439 ASSAY OF FREE THYROXINE: CPT

## 2020-02-25 PROCEDURE — 80053 COMPREHEN METABOLIC PANEL: CPT

## 2020-02-25 PROCEDURE — 84156 ASSAY OF PROTEIN URINE: CPT

## 2020-02-26 ENCOUNTER — OFFICE VISIT (OUTPATIENT)
Dept: NEPHROLOGY | Facility: CLINIC | Age: 70
End: 2020-02-26
Payer: COMMERCIAL

## 2020-02-26 VITALS
BODY MASS INDEX: 33.49 KG/M2 | OXYGEN SATURATION: 93 % | SYSTOLIC BLOOD PRESSURE: 188 MMHG | HEIGHT: 68 IN | DIASTOLIC BLOOD PRESSURE: 98 MMHG | HEART RATE: 73 BPM | WEIGHT: 221 LBS

## 2020-02-26 DIAGNOSIS — I16.0 HYPERTENSIVE URGENCY: Primary | ICD-10-CM

## 2020-02-26 DIAGNOSIS — N18.30 STAGE 3 CHRONIC KIDNEY DISEASE (HCC): ICD-10-CM

## 2020-02-26 DIAGNOSIS — I10 ESSENTIAL HYPERTENSION: ICD-10-CM

## 2020-02-26 PROCEDURE — 99214 OFFICE O/P EST MOD 30 MIN: CPT | Performed by: INTERNAL MEDICINE

## 2020-02-26 RX ORDER — HYDRALAZINE HYDROCHLORIDE 50 MG/1
50 TABLET, FILM COATED ORAL 3 TIMES DAILY
Qty: 90 TABLET | Refills: 3 | Status: SHIPPED | OUTPATIENT
Start: 2020-02-26 | End: 2020-06-22 | Stop reason: SDUPTHER

## 2020-02-26 NOTE — ASSESSMENT & PLAN NOTE
Blood pressures uniformly high at home as well as knee office with a very good cuff  Plasma catecholamines have been ordered  She did have an adrenal adenoma  In the past on CT  Will repeat this  The remainder of the hormone studies are pending    Will increase hydralazine to 50 mg 3 times a day

## 2020-02-26 NOTE — PROGRESS NOTES
[address] [facility] [place], [place]    Name: Ronny Spears  YOB: 1950      Assessment/Plan:           Problem List Items Addressed This Visit        Cardiovascular and Mediastinum    Hypertensive urgency - Primary     Blood pressures uniformly high at home as well as knee office with a very good cuff  Plasma catecholamines have been ordered  She did have an adrenal adenoma  In the past on CT  Will repeat this  The remainder of the hormone studies are pending  Will increase hydralazine to 50 mg 3 times a day         Relevant Medications    hydrALAZINE (APRESOLINE) 50 mg tablet    Other Relevant Orders    CT abdomen pelvis wo contrast    Essential hypertension    Relevant Medications    hydrALAZINE (APRESOLINE) 50 mg tablet       Genitourinary    Stage 3 chronic kidney disease (HCC)            Subjective:      Patient ID: Ronny Spears is a 77 y o  female  HPI Has CKD3 and accelerated hypertension  She brought in her cuff today  Creat 1 12 with a GFR of 51 ml/min    Her blood pressure with Ormron in the office was 170/100 R/L and similar with manual cuff    BP at home are 169/82    The following portions of the patient's history were reviewed and updated as appropriate: allergies, current medications, past family history, past medical history, past social history, past surgical history and problem list     Review of Systems   Constitutional: Negative for activity change, appetite change, fatigue and unexpected weight change  HENT: Negative for congestion, ear discharge, ear pain, trouble swallowing and voice change  Eyes: Negative for pain, discharge and visual disturbance  Respiratory: Negative for cough, chest tightness, shortness of breath and wheezing  Cardiovascular: Negative for chest pain, palpitations and leg swelling  Gastrointestinal: Negative for abdominal pain, blood in stool, constipation, diarrhea, nausea and vomiting     Endocrine: Negative for heat intolerance, polydipsia, polyphagia and polyuria  Genitourinary: Negative for decreased urine volume, difficulty urinating, frequency and urgency  Musculoskeletal: Negative for arthralgias, back pain and gait problem  Skin: Negative for color change and rash  Neurological: Negative for dizziness, weakness and headaches  Social History     Socioeconomic History    Marital status: /Civil Union     Spouse name: None    Number of children: None    Years of education: None    Highest education level: None   Occupational History    None   Social Needs    Financial resource strain: None    Food insecurity:     Worry: None     Inability: None    Transportation needs:     Medical: None     Non-medical: None   Tobacco Use    Smoking status: Former Smoker    Smokeless tobacco: Never Used    Tobacco comment: 30  YEARS AGO    Substance and Sexual Activity    Alcohol use:  Yes     Alcohol/week: 1 0 standard drinks     Types: 1 Glasses of wine per week    Drug use: Never    Sexual activity: None   Lifestyle    Physical activity:     Days per week: None     Minutes per session: None    Stress: None   Relationships    Social connections:     Talks on phone: None     Gets together: None     Attends Voodoo service: None     Active member of club or organization: None     Attends meetings of clubs or organizations: None     Relationship status: None    Intimate partner violence:     Fear of current or ex partner: None     Emotionally abused: None     Physically abused: None     Forced sexual activity: None   Other Topics Concern    None   Social History Narrative    None     Past Medical History:   Diagnosis Date    Abnormal weight gain     Anxiety     Benign essential hypertension     Chronic [facility] disease, stage 3 ([facility] [address] )     Colon polyps     Essential hypertension     Hematuria syndrome     Hyperlipidemia     Hypertension     Morbid obesity ([facility] [address] )     Osteoarthritis     Proteinuria     Vitamin D deficiency      Past Surgical History:   Procedure Laterality Date    BREAST BIOPSY Right     COLONOSCOPY      COLONOSCOPY  12/2018    3 polyps     RECTAL BIOPSY N/A 2/9/2018    Procedure: TRANSANAL EXCISION RECTAL POLYP;MUCOSAL POLYPECTOMY;  Surgeon: Maryam Arana MD;  [facility];  Service: Colorectal       Current Outpatient Medications:     amLODIPine (NORVASC) 10 mg tablet, Take 1 tablet (10 mg total) by mouth daily, Disp: 90 tablet, Rfl: 3    cholecalciferol (VITAMIN D3) 1,000 units tablet, Take 2,000 Units by mouth daily, Disp: , Rfl:     hydrALAZINE (APRESOLINE) 50 mg tablet, Take 1 tablet (50 mg total) by mouth 3 (three) times a day, Disp: 90 tablet, Rfl: 3    lisinopril (ZESTRIL) 20 mg tablet, Take 20 mg by mouth 2 (two) times a day, Disp: , Rfl:     metoprolol tartrate (LOPRESSOR) 100 mg tablet, 1 1/2 in the morning and 1 at night, Disp: 240 tablet, Rfl: 3    rosuvastatin (CRESTOR) 20 MG tablet, Take 20 mg by mouth every other day , Disp: , Rfl: 4    Lab Results   Component Value Date     (L) 05/30/2018    SODIUM 139 02/25/2020    K 4 2 02/25/2020     02/25/2020    CO2 29 02/25/2020    ANIONGAP 13 1 05/30/2018    AGAP 8 02/25/2020    BUN 12 02/25/2020    CREATININE 1 12 02/25/2020    GLUC 132 (H) 10/05/2019    GLUF 100 (H) 02/25/2020    CALCIUM 9 3 02/25/2020    AST 17 02/25/2020    ALT 19 02/25/2020    ALKPHOS 47 (L) 02/25/2020    PROT 7 8 05/04/2018    TP 7 1 02/25/2020    BILITOT 0 7 05/04/2018    TBILI 0 50 02/25/2020    EGFR 51 02/25/2020     Lab Results   Component Value Date    WBC 6 20 02/25/2020    HGB 12 9 02/25/2020    HCT 38 5 (L) 02/25/2020    MCV 90 02/25/2020     02/25/2020     Lab Results   Component Value Date    CHOLESTEROL 131 02/25/2020    CHOLESTEROL 150 05/16/2019     Lab Results   Component Value Date    HDL 45 02/25/2020    HDL 47 05/16/2019    HDL 41 05/04/2018     Lab Results   Component Value Date    LDLCALC 56 02/25/2020    LDLCALC 63 05/16/2019    LDLCALC 189 6 05/04/2018     Lab Results   Component Value Date    TRIG 151 02/25/2020    TRIG 198 (H) 05/16/2019    TRIG 221 (H) 05/04/2018     No results found for: [place], [place]  Lab Results   Component Value Date    CLB6CKSDZTBX 3 950 02/25/2020     Lab Results   Component Value Date    CALCIUM 9 3 02/25/2020     No results found for: SPEP, UPEP  No results found for: KYLEEALBUR, [id number]        Objective:      BP (!) 188/98 (BP Location: Left arm, Patient Position: Sitting, Cuff Size: Standard)   Pulse 73   Ht 5' 8" (1 727 m)   Wt 100 kg (221 lb)   SpO2 93%   BMI 33 60 kg/m²          Physical Exam   Constitutional: She is oriented to person, place, and time  She appears well-developed and well-nourished  No distress  HENT:   Head: Normocephalic  Right Ear: External ear normal    Left Ear: External ear normal    Nose: Nose normal    Mouth/Throat: Oropharynx is clear and moist    Eyes: Pupils are equal, round, and reactive to light  Conjunctivae are normal    Cardiovascular: Normal rate, regular rhythm and normal heart sounds  No murmur heard  Pulmonary/Chest: Effort normal and breath sounds normal  No respiratory distress  She has no wheezes  She has no rales  Abdominal: Soft  Bowel sounds are normal  She exhibits no distension  There is no tenderness  There is no rebound and no guarding  Musculoskeletal: Normal range of motion  She exhibits no edema  Neurological: She is alert and oriented to person, place, and time  Skin: Skin is warm and dry  Capillary refill takes less than 2 seconds  She is not diaphoretic  Psychiatric: She has a normal mood and affect

## 2020-02-28 LAB
DOPAMINE 24H UR-MRATE: <30 PG/ML (ref 0–48)
EPINEPH PLAS-MCNC: 15 PG/ML (ref 0–62)
NOREPINEPH PLAS-MCNC: 716 PG/ML (ref 0–874)

## 2020-03-02 ENCOUNTER — TELEPHONE (OUTPATIENT)
Dept: NEPHROLOGY | Facility: CLINIC | Age: 70
End: 2020-03-02

## 2020-03-02 NOTE — TELEPHONE ENCOUNTER
Patient aware, she states that her blood pressure is better than it has been  She is keep a log for you for next week for her apt

## 2020-03-02 NOTE — TELEPHONE ENCOUNTER
----- Message from James Weeks MD sent at 2/28/2020 12:11 PM EST -----  Catecholamines (adrenaline ) in blood was normal   How is her BP

## 2020-03-04 ENCOUNTER — HOSPITAL ENCOUNTER (OUTPATIENT)
Dept: CT IMAGING | Facility: HOSPITAL | Age: 70
Discharge: HOME | End: 2020-03-04
Attending: INTERNAL MEDICINE
Payer: COMMERCIAL

## 2020-03-04 DIAGNOSIS — I16.0 HYPERTENSIVE URGENCY: ICD-10-CM

## 2020-03-04 PROCEDURE — 74176 CT ABD & PELVIS W/O CONTRAST: CPT

## 2020-03-09 ENCOUNTER — TELEPHONE (OUTPATIENT)
Dept: NEPHROLOGY | Facility: CLINIC | Age: 70
End: 2020-03-09

## 2020-03-09 DIAGNOSIS — K63.89 MESENTERIC MASS: Primary | ICD-10-CM

## 2020-03-09 NOTE — TELEPHONE ENCOUNTER
Radiology called with significant finding for patients CT scan of abdomen  She states that the results are in patients chart  Please advise

## 2020-03-09 NOTE — TELEPHONE ENCOUNTER
CT scan reviewed, patient's  small adrenal nodule is unchanged  However, patient was also noted to have something in the middle part of her belly, the mesenteric area  The radiologist could not tell what this is  I believe the best approach in further evaluating this to have the patient see a general surgeon who have experience a looking at the abdomen and distinguishing between different potential causes  This surgeon may recommend simple observation with further imaging, or up to including surgical intervention or perhaps just a biopsy  We will need to leave this to their expertise  I would like to him have the patient make an appointment as soon as possible, referral was placed in the chart as an ASAP appointment

## 2020-03-11 ENCOUNTER — OFFICE VISIT (OUTPATIENT)
Dept: SURGERY | Facility: CLINIC | Age: 70
End: 2020-03-11
Payer: COMMERCIAL

## 2020-03-11 VITALS
RESPIRATION RATE: 18 BRPM | BODY MASS INDEX: 33.95 KG/M2 | HEIGHT: 68 IN | SYSTOLIC BLOOD PRESSURE: 130 MMHG | DIASTOLIC BLOOD PRESSURE: 72 MMHG | HEART RATE: 74 BPM | WEIGHT: 224 LBS | TEMPERATURE: 97.6 F

## 2020-03-11 DIAGNOSIS — K63.89 MESENTERIC MASS: ICD-10-CM

## 2020-03-11 DIAGNOSIS — D48.4 NEOPLASM OF UNCERTAIN BEHAVIOR OF MESENTERY: Primary | ICD-10-CM

## 2020-03-11 PROCEDURE — 99204 OFFICE O/P NEW MOD 45 MIN: CPT | Performed by: SURGERY

## 2020-03-11 RX ORDER — SODIUM CHLORIDE, SODIUM LACTATE, POTASSIUM CHLORIDE, CALCIUM CHLORIDE 600; 310; 30; 20 MG/100ML; MG/100ML; MG/100ML; MG/100ML
125 INJECTION, SOLUTION INTRAVENOUS CONTINUOUS
Status: CANCELLED | OUTPATIENT
Start: 2020-03-24

## 2020-03-11 RX ORDER — CEFAZOLIN SODIUM 2 G/50ML
2000 SOLUTION INTRAVENOUS ONCE
Status: CANCELLED | OUTPATIENT
Start: 2020-03-24 | End: 2020-03-11

## 2020-03-11 NOTE — PROGRESS NOTES
Assessment/Plan:    Neoplasm of uncertain behavior of mesentery  Patient is a pleasant 61-year-old female with a past medical history significant for hypertension presenting with a newly identified mesenteric mass for which biopsy is indicated to exclude malignancy  According the patient she was in her usual state of health until recently  She was recommended to undergo repeat CT scan of the abdomen and pelvis to follow the natural course of an adrenal adenoma  At the time of this investigation a 3 cm mesenteric mass was identified  Patient referred to General surgery for diagnostic and therapeutic treatment recommendations  Today in the office the patient reports feeling well  She has a history for hypertension which is controlled with medications  She denies all other medical conditions or comorbidities  She has had no prior surgeries within her abdomen  She reports a history of benign rectal neoplasm excised transanally by Dr Alvarez Ill  By his account his last documented no described as an in situ rectal carcinoma  On physical examination the patient is awake alert and oriented  She is competent reliable as a historian  Her abdomen is soft rotund  Nontender to percussion and palpation in 4 quadrants no guarding rebound peritoneal signs  No masses noted no hernias appreciated  The diagnosis of a mesenteric mass was explained to the patient  Pictures were drawn in order to help her understand more clearly her diagnosis  I have recommended diagnostic laparoscopy and frozen section possible exploratory laparotomy and resection of the mesenteric mass for diagnostic and therapeutic purposes  She understands the potential for both malignant benign disease  She understands the risks related to anesthesia, bleeding, infection, bowel injury and even the potential for death as a complication of this surgery  She understands that if it is to be resected it would require an open operation  Patient consented to the transfusion of blood products on an as-needed basis  All questions were answered to the satisfaction of the patient and informed consent obtained to proceed  Diagnoses and all orders for this visit:    Neoplasm of uncertain behavior of mesentery  -     Case request operating room: LAPAROSCOPY POSSIBLE LAPAROTOMY; Standing  -     Ambulatory referral to Internal Medicine; Future  -     EKG 12 lead; Future  -     XR chest pa & lateral; Future  -     Type and screen; Future  -     Case request operating room: LAPAROSCOPY POSSIBLE LAPAROTOMY    Mesenteric mass  -     Ambulatory referral to General Surgery    Other orders  -     co-enzyme Q-10 50 MG capsule; Take 50 mg by mouth every other day  -     Vital signs; Standing  -     Diet NPO; Sips with meds; Standing  -     lactated ringers infusion  -     Apply Sequential Compression Device; Standing  -     ceFAZolin (ANCEF) 2,000 mg in dextrose 5 % 100 mL IVPB  -     Place sequential compression device; Standing          Subjective:      Patient ID: Ronny Spears is a 77 y o  female  Patient states she occasionally has right lower quadrant pain and at times feels like a stabbing feeling which has been going on for about 3 months  She would also like to have you go over her CT results  Colonrectal screening was reviewed with the patient and she is following up with the avril Charles      The following portions of the patient's history were reviewed and updated as appropriate: allergies, current medications, past family history, past medical history, past social history, past surgical history and problem list     Review of Systems   Constitutional: Negative  HENT: Negative  Eyes: Negative  Respiratory: Negative  Cardiovascular: Negative  Gastrointestinal: Negative  Endocrine: Negative  Genitourinary: Negative  Musculoskeletal: Negative  Skin: Negative  Allergic/Immunologic: Negative  Neurological: Negative  Hematological: Negative  Psychiatric/Behavioral: Negative  All other systems reviewed and are negative  Objective:      /72 (BP Location: Left arm, Patient Position: Sitting, Cuff Size: Standard)   Pulse 74   Temp 97 6 °F (36 4 °C) (Tympanic)   Resp 18   Ht 5' 8" (1 727 m)   Wt 102 kg (224 lb)   BMI 34 06 kg/m²          Physical Exam   Constitutional: She is oriented to person, place, and time  She appears well-developed and well-nourished  HENT:   Head: Normocephalic and atraumatic  Eyes: Pupils are equal, round, and reactive to light  Conjunctivae are normal    Neck: Normal range of motion  Neck supple  Cardiovascular: Normal rate and regular rhythm  Pulmonary/Chest: Effort normal and breath sounds normal    Abdominal: Soft  Bowel sounds are normal    Musculoskeletal: Normal range of motion  Neurological: She is alert and oriented to person, place, and time  Skin: Skin is warm and dry     Psychiatric: Her behavior is normal  Judgment and thought content normal

## 2020-03-11 NOTE — ASSESSMENT & PLAN NOTE
Patient is a pleasant 64-year-old female with a past medical history significant for hypertension presenting with a newly identified mesenteric mass for which biopsy is indicated to exclude malignancy  According the patient she was in her usual state of health until recently  She was recommended to undergo repeat CT scan of the abdomen and pelvis to follow the natural course of an adrenal adenoma  At the time of this investigation a 3 cm mesenteric mass was identified  Patient referred to General surgery for diagnostic and therapeutic treatment recommendations  Today in the office the patient reports feeling well  She has a history for hypertension which is controlled with medications  She denies all other medical conditions or comorbidities  She has had no prior surgeries within her abdomen  She reports a history of benign rectal neoplasm excised transanally by Dr Ellyn Kocher  By his account his last documented no described as an in situ rectal carcinoma  On physical examination the patient is awake alert and oriented  She is competent reliable as a historian  Her abdomen is soft rotund  Nontender to percussion and palpation in 4 quadrants no guarding rebound peritoneal signs  No masses noted no hernias appreciated  The diagnosis of a mesenteric mass was explained to the patient  Pictures were drawn in order to help her understand more clearly her diagnosis  I have recommended diagnostic laparoscopy and frozen section possible exploratory laparotomy and resection of the mesenteric mass for diagnostic and therapeutic purposes  She understands the potential for both malignant benign disease  She understands the risks related to anesthesia, bleeding, infection, bowel injury and even the potential for death as a complication of this surgery  She understands that if it is to be resected it would require an open operation    Patient consented to the transfusion of blood products on an as-needed basis  All questions were answered to the satisfaction of the patient and informed consent obtained to proceed

## 2020-03-11 NOTE — H&P
Assessment/Plan:    Neoplasm of uncertain behavior of mesentery  Patient is a pleasant 71-year-old female with a past medical history significant for hypertension presenting with a newly identified mesenteric mass for which biopsy is indicated to exclude malignancy  According the patient she was in her usual state of health until recently  She was recommended to undergo repeat CT scan of the abdomen and pelvis to follow the natural course of an adrenal adenoma  At the time of this investigation a 3 cm mesenteric mass was identified  Patient referred to General surgery for diagnostic and therapeutic treatment recommendations  Today in the office the patient reports feeling well  She has a history for hypertension which is controlled with medications  She denies all other medical conditions or comorbidities  She has had no prior surgeries within her abdomen  She reports a history of benign rectal neoplasm excised transanally by Dr Mariangel Gupta  By his account his last documented no described as an in situ rectal carcinoma  On physical examination the patient is awake alert and oriented  She is competent reliable as a historian  Her abdomen is soft rotund  Nontender to percussion and palpation in 4 quadrants no guarding rebound peritoneal signs  No masses noted no hernias appreciated  The diagnosis of a mesenteric mass was explained to the patient  Pictures were drawn in order to help her understand more clearly her diagnosis  I have recommended diagnostic laparoscopy and frozen section possible exploratory laparotomy and resection of the mesenteric mass for diagnostic and therapeutic purposes  She understands the potential for both malignant benign disease  She understands the risks related to anesthesia, bleeding, infection, bowel injury and even the potential for death as a complication of this surgery  She understands that if it is to be resected it would require an open operation  Patient consented to the transfusion of blood products on an as-needed basis  All questions were answered to the satisfaction of the patient and informed consent obtained to proceed  Diagnoses and all orders for this visit:    Neoplasm of uncertain behavior of mesentery  -     Case request operating room: LAPAROSCOPY POSSIBLE LAPAROTOMY; Standing  -     Ambulatory referral to Internal Medicine; Future  -     EKG 12 lead; Future  -     XR chest pa & lateral; Future  -     Type and screen; Future  -     Case request operating room: LAPAROSCOPY POSSIBLE LAPAROTOMY    Mesenteric mass  -     Ambulatory referral to General Surgery    Other orders  -     co-enzyme Q-10 50 MG capsule; Take 50 mg by mouth every other day  -     Vital signs; Standing  -     Diet NPO; Sips with meds; Standing  -     lactated ringers infusion  -     Apply Sequential Compression Device; Standing  -     ceFAZolin (ANCEF) 2,000 mg in dextrose 5 % 100 mL IVPB  -     Place sequential compression device; Standing          Subjective:      Patient ID: Stanislav Griffith is a 77 y o  female  Patient states she occasionally has right lower quadrant pain and at times feels like a stabbing feeling which has been going on for about 3 months  She would also like to have you go over her CT results  Colonrectal screening was reviewed with the patient and she is following up with the [facility]      The following portions of the patient's history were reviewed and updated as appropriate: allergies, current medications, past family history, past medical history, past social history, past surgical history and problem list     Review of Systems   Constitutional: Negative  HENT: Negative  Eyes: Negative  Respiratory: Negative  Cardiovascular: Negative  Gastrointestinal: Negative  Endocrine: Negative  Genitourinary: Negative  Musculoskeletal: Negative  Skin: Negative  Allergic/Immunologic: Negative  Neurological: Negative  Hematological: Negative  Psychiatric/Behavioral: Negative  All other systems reviewed and are negative  Objective:      /72 (BP Location: Left arm, Patient Position: Sitting, Cuff Size: Standard)   Pulse 74   Temp 97 6 °F (36 4 °C) (Tympanic)   Resp 18   Ht 5' 8" (1 727 m)   Wt 102 kg (224 lb)   BMI 34 06 kg/m²           Physical Exam   Constitutional: She is oriented to person, place, and time  She appears well-developed and well-nourished  HENT:   Head: Normocephalic and atraumatic  Eyes: Pupils are equal, round, and reactive to light  Conjunctivae are normal    Neck: Normal range of motion  Neck supple  Cardiovascular: Normal rate and regular rhythm  Pulmonary/Chest: Effort normal and breath sounds normal    Abdominal: Soft  Bowel sounds are normal    Musculoskeletal: Normal range of motion  Neurological: She is alert and oriented to person, place, and time  Skin: Skin is warm and dry     Psychiatric: Her behavior is normal  Judgment and thought content normal

## 2020-03-11 NOTE — LETTER
March 11, 2020     [place], DO [address]  [facility] [address]    Patient: Shay Griffith   YOB: 1950   Date of Visit: 3/11/2020       Dear Dr Arminda Russell:    Thank you for referring Leonard Simon to me for evaluation  Below are my notes for this consultation  If you have questions, please do not hesitate to call me  I look forward to following your patient along with you  Sincerely,        Lyndsey Torres MD        CC: Precious Patient, MD Lyndsey Torres MD  3/11/2020 12:45 PM  Sign at close encounter  Assessment/Plan:    Neoplasm of uncertain behavior of mesentery  Patient is a pleasant 44-year-old female with a past medical history significant for hypertension presenting with a newly identified mesenteric mass for which biopsy is indicated to exclude malignancy  According the patient she was in her usual state of health until recently  She was recommended to undergo repeat CT scan of the abdomen and pelvis to follow the natural course of an adrenal adenoma  At the time of this investigation a 3 cm mesenteric mass was identified  Patient referred to General surgery for diagnostic and therapeutic treatment recommendations  Today in the office the patient reports feeling well  She has a history for hypertension which is controlled with medications  She denies all other medical conditions or comorbidities  She has had no prior surgeries within her abdomen  She reports a history of benign rectal neoplasm excised transanally by Dr Leonard Hurtado  By his account his last documented no described as an in situ rectal carcinoma  On physical examination the patient is awake alert and oriented  She is competent reliable as a historian  Her abdomen is soft rotund  Nontender to percussion and palpation in 4 quadrants no guarding rebound peritoneal signs  No masses noted no hernias appreciated  The diagnosis of a mesenteric mass was explained to the patient  Pictures were drawn in order to help her understand more clearly her diagnosis  I have recommended diagnostic laparoscopy and frozen section possible exploratory laparotomy and resection of the mesenteric mass for diagnostic and therapeutic purposes  She understands the potential for both malignant benign disease  She understands the risks related to anesthesia, bleeding, infection, bowel injury and even the potential for death as a complication of this surgery  She understands that if it is to be resected it would require an open operation  Patient consented to the transfusion of blood products on an as-needed basis  All questions were answered to the satisfaction of the patient and informed consent obtained to proceed  Diagnoses and all orders for this visit:    Neoplasm of uncertain behavior of mesentery  -     Case request operating room: LAPAROSCOPY POSSIBLE LAPAROTOMY; Standing  -     Ambulatory referral to Internal Medicine; Future  -     EKG 12 lead; Future  -     XR chest pa & lateral; Future  -     Type and screen; Future  -     Case request operating room: LAPAROSCOPY POSSIBLE LAPAROTOMY    Mesenteric mass  -     Ambulatory referral to General Surgery    Other orders  -     co-enzyme Q-10 50 MG capsule; Take 50 mg by mouth every other day  -     Vital signs; Standing  -     Diet NPO; Sips with meds; Standing  -     lactated ringers infusion  -     Apply Sequential Compression Device; Standing  -     ceFAZolin (ANCEF) 2,000 mg in dextrose 5 % 100 mL IVPB  -     Place sequential compression device; Standing          Subjective:      Patient ID: Ronny Spears is a 77 y o  female  Patient states she occasionally has right lower quadrant pain and at times feels like a stabbing feeling which has been going on for about 3 months  She would also like to have you go over her CT results  Colonrectal screening was reviewed with the patient and she is following up with the [facility]      The following portions of the patient's history were reviewed and updated as appropriate: allergies, current medications, past family history, past medical history, past social history, past surgical history and problem list     Review of Systems   Constitutional: Negative  HENT: Negative  Eyes: Negative  Respiratory: Negative  Cardiovascular: Negative  Gastrointestinal: Negative  Endocrine: Negative  Genitourinary: Negative  Musculoskeletal: Negative  Skin: Negative  Allergic/Immunologic: Negative  Neurological: Negative  Hematological: Negative  Psychiatric/Behavioral: Negative  All other systems reviewed and are negative  Objective:      /72 (BP Location: Left arm, Patient Position: Sitting, Cuff Size: Standard)   Pulse 74   Temp 97 6 °F (36 4 °C) (Tympanic)   Resp 18   Ht 5' 8" (1 727 m)   Wt 102 kg (224 lb)   BMI 34 06 kg/m²           Physical Exam   Constitutional: She is oriented to person, place, and time  She appears well-developed and well-nourished  HENT:   Head: Normocephalic and atraumatic  Eyes: Pupils are equal, round, and reactive to light  Conjunctivae are normal    Neck: Normal range of motion  Neck supple  Cardiovascular: Normal rate and regular rhythm  Pulmonary/Chest: Effort normal and breath sounds normal    Abdominal: Soft  Bowel sounds are normal    Musculoskeletal: Normal range of motion  Neurological: She is alert and oriented to person, place, and time  Skin: Skin is warm and dry     Psychiatric: Her behavior is normal  Judgment and thought content normal

## 2020-03-12 ENCOUNTER — OFFICE VISIT (OUTPATIENT)
Dept: NEPHROLOGY | Facility: CLINIC | Age: 70
End: 2020-03-12
Payer: COMMERCIAL

## 2020-03-12 VITALS
OXYGEN SATURATION: 98 % | BODY MASS INDEX: 33.65 KG/M2 | WEIGHT: 222 LBS | HEART RATE: 86 BPM | DIASTOLIC BLOOD PRESSURE: 96 MMHG | HEIGHT: 68 IN | SYSTOLIC BLOOD PRESSURE: 182 MMHG

## 2020-03-12 DIAGNOSIS — E78.00 PURE HYPERCHOLESTEROLEMIA: ICD-10-CM

## 2020-03-12 DIAGNOSIS — D48.4 NEOPLASM OF UNCERTAIN BEHAVIOR OF MESENTERY: Primary | ICD-10-CM

## 2020-03-12 DIAGNOSIS — I10 ESSENTIAL HYPERTENSION: ICD-10-CM

## 2020-03-12 DIAGNOSIS — I16.0 HYPERTENSIVE URGENCY: ICD-10-CM

## 2020-03-12 DIAGNOSIS — J01.90 ACUTE NON-RECURRENT SINUSITIS, UNSPECIFIED LOCATION: ICD-10-CM

## 2020-03-12 DIAGNOSIS — N18.30 STAGE 3 CHRONIC KIDNEY DISEASE (HCC): ICD-10-CM

## 2020-03-12 PROCEDURE — 99215 OFFICE O/P EST HI 40 MIN: CPT | Performed by: INTERNAL MEDICINE

## 2020-03-12 RX ORDER — FLUTICASONE PROPIONATE 50 MCG
2 SPRAY, SUSPENSION (ML) NASAL DAILY
Qty: 1 BOTTLE | Refills: 2 | Status: SHIPPED | OUTPATIENT
Start: 2020-03-12 | End: 2021-02-25

## 2020-03-12 RX ORDER — AMOXICILLIN 500 MG/1
500 CAPSULE ORAL EVERY 8 HOURS SCHEDULED
Qty: 21 CAPSULE | Refills: 0 | Status: SHIPPED | OUTPATIENT
Start: 2020-03-12 | End: 2020-03-19

## 2020-03-12 NOTE — PROGRESS NOTES
[address] [facility], [place]    Name: Maxine Whiting  YOB: 1950      Assessment/Plan:She has a history of stage 3 chronic kidney disease with a GFR 51 mils per minute  She is nonoliguric  She has been very very stable  She has significant white coat hypertension with excellent blood pressures at home in the 120-130 range  We have compared her cuff to our cuff and her blood pressures at home are accurate   She had a workup for secondary hypertension which demonstrated an adrenal adenoma  Follow-up for that did demonstrate a new mesenteric mass  This will be biopsied by Dr Desmond Mcmillan is on March 24th  She has no  Medical contraindications to the surgery and is medically cleared  She will have preoperative labs, CXR  as well as an EKG  And as long as those are stable she can proceed with the surgery  I will see her back after the surgery         Problem List Items Addressed This Visit        Digestive    Neoplasm of uncertain behavior of mesentery - Primary       Cardiovascular and Mediastinum    Hypertensive urgency    Essential hypertension       Genitourinary    Stage 3 chronic kidney disease ([address] )       Other    Pure hypercholesterolemia            Subjective:      Patient ID: Maxine Whiting is a 77 y o  female  HPI  CT done for adrenal adenoma demonstrated a mesenteric mass  Will have excision by Dr Rema Roberson and biopsy  She will have the biopsy the 24th of March      She has white coat hypertension with BP in the 120-130 range at home   However, here in the office BP is high  She had a workup for secondary hypertension which just demonstrated an adrenal adenoma  The workup then demonstrated a mass    She complains of sinus pain    She feels congested in the morning  The following portions of the patient's history were reviewed and updated as appropriate: allergies, current medications, past family history, past medical history, past social history, past surgical history and problem list     Review of Systems   Constitutional: Negative for activity change, appetite change, fatigue and unexpected weight change  HENT: Negative for congestion, ear discharge, ear pain, trouble swallowing and voice change  Eyes: Negative for pain, discharge and visual disturbance  Respiratory: Negative for cough, chest tightness, shortness of breath and wheezing  Cardiovascular: Negative for chest pain, palpitations and leg swelling  Gastrointestinal: Negative for abdominal pain, blood in stool, constipation, diarrhea, nausea and vomiting  Endocrine: Negative for heat intolerance, polydipsia, polyphagia and polyuria  Genitourinary: Negative for decreased urine volume, difficulty urinating, frequency and urgency  Musculoskeletal: Negative for arthralgias, back pain and gait problem  Skin: Negative for color change and rash  Neurological: Negative for dizziness, weakness and headaches  Social History     Socioeconomic History    Marital status: /Civil Union     Spouse name: None    Number of children: None    Years of education: None    Highest education level: None   Occupational History    None   Social Needs    Financial resource strain: None    Food insecurity:     Worry: None     Inability: None    Transportation needs:     Medical: None     Non-medical: None   Tobacco Use    Smoking status: Former Smoker    Smokeless tobacco: Never Used    Tobacco comment: 30  YEARS AGO    Substance and Sexual Activity    Alcohol use:  Yes     Alcohol/week: 1 0 standard drinks     Types: 1 Glasses of wine per week    Drug use: Never    Sexual activity: None   Lifestyle    Physical activity:     Days per week: None     Minutes per session: None    Stress: None   Relationships    Social connections:     Talks on phone: None     Gets together: None     Attends Jew service: None     Active member of club or organization: None Attends meetings of clubs or organizations: None     Relationship status: None    Intimate partner violence:     Fear of current or ex partner: None     Emotionally abused: None     Physically abused: None     Forced sexual activity: None   Other Topics Concern    None   Social History Narrative    None     Past Medical History:   Diagnosis Date    Abnormal weight gain     Anxiety     Benign essential hypertension     Chronic kidney disease, stage 3 (HCC)     Colon polyps     Essential hypertension     Hematuria syndrome     Hyperlipidemia     Hypertension     Morbid obesity ([address] )     Osteoarthritis     Proteinuria     Vitamin D deficiency      Past Surgical History:   Procedure Laterality Date    BREAST BIOPSY Right     COLONOSCOPY      COLONOSCOPY  12/2018    3 polyps     RECTAL BIOPSY N/A 2/9/2018    Procedure: TRANSANAL EXCISION RECTAL POLYP;MUCOSAL POLYPECTOMY;  Surgeon: Liset Adam MD;  Location: [facility];  Service: Colorectal       Current Outpatient Medications:     amLODIPine (NORVASC) 10 mg tablet, Take 1 tablet (10 mg total) by mouth daily, Disp: 90 tablet, Rfl: 3    cholecalciferol (VITAMIN D3) 1,000 units tablet, Take 2,000 Units by mouth daily, Disp: , Rfl:     co-enzyme Q-10 50 MG capsule, Take 50 mg by mouth every other day, Disp: , Rfl:     hydrALAZINE (APRESOLINE) 50 mg tablet, Take 1 tablet (50 mg total) by mouth 3 (three) times a day, Disp: 90 tablet, Rfl: 3    lisinopril (ZESTRIL) 20 mg tablet, Take 20 mg by mouth 2 (two) times a day, Disp: , Rfl:     metoprolol tartrate (LOPRESSOR) 100 mg tablet, 1 1/2 in the morning and 1 at night, Disp: 240 tablet, Rfl: 3    rosuvastatin (CRESTOR) 20 MG tablet, Take 20 mg by mouth every other day , Disp: , Rfl: 4    Lab Results   Component Value Date     (L) 05/30/2018    SODIUM 139 02/25/2020    K 4 2 02/25/2020     02/25/2020    CO2 29 02/25/2020    ANIONGAP 13 1 05/30/2018    AGAP 8 02/25/2020    BUN 12 02/25/2020    CREATININE 1 12 02/25/2020    GLUC 132 (H) 10/05/2019    GLUF 100 (H) 02/25/2020    CALCIUM 9 3 02/25/2020    AST 17 02/25/2020    ALT 19 02/25/2020    ALKPHOS 47 (L) 02/25/2020    PROT 7 8 05/04/2018    TP 7 1 02/25/2020    BILITOT 0 7 05/04/2018    TBILI 0 50 02/25/2020    EGFR 51 02/25/2020     Lab Results   Component Value Date    WBC 6 20 02/25/2020    HGB 12 9 02/25/2020    HCT 38 5 (L) 02/25/2020    MCV 90 02/25/2020     02/25/2020     Lab Results   Component Value Date    CHOLESTEROL 131 02/25/2020    CHOLESTEROL 150 05/16/2019     Lab Results   Component Value Date    HDL 45 02/25/2020    HDL 47 05/16/2019    HDL 41 05/04/2018     Lab Results   Component Value Date    LDLCALC 56 02/25/2020    LDLCALC 63 05/16/2019    LDLCALC 189 6 05/04/2018     Lab Results   Component Value Date    TRIG 151 02/25/2020    TRIG 198 (H) 05/16/2019    TRIG 221 (H) 05/04/2018     No results found for: [place], [place]  Lab Results   Component Value Date    HVI1OZAECVRZ 3 950 02/25/2020     Lab Results   Component Value Date    CALCIUM 9 3 02/25/2020     No results found for: SPEP, UPEP  No results found for: ABHI [id number]        Objective:      BP (!) 182/96 (BP Location: Left arm, Patient Position: Sitting, Cuff Size: Standard)   Pulse 86   Ht 5' 8" (1 727 m)   Wt 101 kg (222 lb)   SpO2 98%   BMI 33 75 kg/m²          Physical Exam   Constitutional: She is oriented to person, place, and time  She appears well-developed and well-nourished  No distress  HENT:   Head: Normocephalic  Right Ear: External ear normal    Left Ear: External ear normal    Nose: Nose normal    Mouth/Throat: Oropharynx is clear and moist    Eyes: Pupils are equal, round, and reactive to light  Conjunctivae are normal    Cardiovascular: Normal rate, regular rhythm and normal heart sounds  No murmur heard  Pulmonary/Chest: Effort normal and breath sounds normal  No respiratory distress  She has no wheezes  She has no rales  Abdominal: Soft  Bowel sounds are normal  She exhibits no distension  There is no tenderness  There is no rebound and no guarding  Musculoskeletal: Normal range of motion  She exhibits no edema  Neurological: She is alert and oriented to person, place, and time  Skin: Skin is warm and dry  Capillary refill takes less than 2 seconds  She is not diaphoretic  Psychiatric: She has a normal mood and affect

## 2020-03-16 ENCOUNTER — HOSPITAL ENCOUNTER (OUTPATIENT)
Dept: NON INVASIVE DIAGNOSTICS | Facility: HOSPITAL | Age: 70
Discharge: HOME | End: 2020-03-16
Attending: SURGERY
Payer: COMMERCIAL

## 2020-03-16 ENCOUNTER — APPOINTMENT (OUTPATIENT)
Dept: LAB | Facility: HOSPITAL | Age: 70
End: 2020-03-16
Attending: SURGERY
Payer: COMMERCIAL

## 2020-03-16 ENCOUNTER — HOSPITAL ENCOUNTER (OUTPATIENT)
Dept: RADIOLOGY | Facility: HOSPITAL | Age: 70
Discharge: HOME | End: 2020-03-16
Attending: SURGERY
Payer: COMMERCIAL

## 2020-03-16 DIAGNOSIS — D48.4 NEOPLASM OF UNCERTAIN BEHAVIOR OF MESENTERY: ICD-10-CM

## 2020-03-16 DIAGNOSIS — D48.4 NEOPLASM OF UNCERTAIN BEHAVIOR OF MESENTERY: Primary | ICD-10-CM

## 2020-03-16 LAB
ABO GROUP BLD: NORMAL
ABO GROUP BLD: NORMAL
ATRIAL RATE: 68 BPM
BLD GP AB SCN SERPL QL: NEGATIVE
P AXIS: 47 DEGREES
PR INTERVAL: 188 MS
QRS AXIS: 46 DEGREES
QRSD INTERVAL: 94 MS
QT INTERVAL: 406 MS
QTC INTERVAL: 431 MS
RH BLD: NEGATIVE
RH BLD: NEGATIVE
SPECIMEN EXPIRATION DATE: NORMAL
T WAVE AXIS: 43 DEGREES
VENTRICULAR RATE: 68 BPM

## 2020-03-16 PROCEDURE — 86900 BLOOD TYPING SEROLOGIC ABO: CPT

## 2020-03-16 PROCEDURE — 86850 RBC ANTIBODY SCREEN: CPT

## 2020-03-16 PROCEDURE — 36415 COLL VENOUS BLD VENIPUNCTURE: CPT

## 2020-03-16 PROCEDURE — 93005 ELECTROCARDIOGRAM TRACING: CPT

## 2020-03-16 PROCEDURE — 71046 X-RAY EXAM CHEST 2 VIEWS: CPT

## 2020-03-16 PROCEDURE — 93010 ELECTROCARDIOGRAM REPORT: CPT | Performed by: INTERNAL MEDICINE

## 2020-03-16 PROCEDURE — 86901 BLOOD TYPING SEROLOGIC RH(D): CPT

## 2020-03-18 ENCOUNTER — TELEPHONE (OUTPATIENT)
Dept: SURGERY | Facility: CLINIC | Age: 70
End: 2020-03-18

## 2020-03-18 NOTE — TELEPHONE ENCOUNTER
Spoke with patient to let her know that in light of COVID-19 that her procedure scheduled for 03/24/2020 may be canceled  She will be called with an update on Monday 03/23/2020  Patient agreed to this plan at this time

## 2020-03-23 ENCOUNTER — ANESTHESIA EVENT (OUTPATIENT)
Dept: PERIOP | Facility: HOSPITAL | Age: 70
End: 2020-03-23
Payer: COMMERCIAL

## 2020-03-23 NOTE — ANESTHESIA PREPROCEDURE EVALUATION
Review of Systems/Medical History  Patient summary reviewed  Chart reviewed  No history of anesthetic complications     Cardiovascular  Hyperlipidemia, Hypertension ,    Pulmonary  Negative pulmonary ROS        GI/Hepatic    GI malignancy,   Comment: Mesenteric mass, 3cm  Hx of rectal carcinoma, s/p resection     Kidney disease CKD, Chronic kidney disease stage 3,        Endo/Other    Obesity    GYN  Negative gynecology ROS          Hematology  Negative hematology ROS      Musculoskeletal    Arthritis     Neurology  Negative neurology ROS      Psychology   Anxiety,              Physical Exam    Airway    Mallampati score: II  TM Distance: >3 FB  Neck ROM: full     Dental   No notable dental hx     Cardiovascular  Rhythm: regular, Rate: normal, Cardiovascular exam normal    Pulmonary  Pulmonary exam normal Breath sounds clear to auscultation,     Other Findings        Anesthesia Plan  ASA Score- 3     Anesthesia Type- general and regional with ASA Monitors  Additional Monitors:   Airway Plan: ETT  Comment: TAP blocks will be placed if case is converted to laparotomy  Plan Factors-    Induction- intravenous  Postoperative Plan- Plan for postoperative opioid use  Informed Consent- Anesthetic plan and risks discussed with patient  I personally reviewed this patient with the CRNA  Discussed and agreed on the Anesthesia Plan with the CRNA  Roseanne Olguin Recent labs personally reviewed:  Lab Results   Component Value Date    WBC 6 20 02/25/2020    HGB 12 9 02/25/2020     02/25/2020     Lab Results   Component Value Date     (L) 05/30/2018    K 4 2 02/25/2020    BUN 12 02/25/2020    CREATININE 1 12 02/25/2020     Lab Results   Component Value Date    PTT 27 10/05/2019      Lab Results   Component Value Date    INR 0 97 10/05/2019       Blood type A    No results found for: Kaiden Diehl MD, have personally seen and evaluated the patient prior to anesthetic care    I have reviewed the pre-anesthetic record, and other medical records if appropriate to the anesthetic care  If a CRNA is involved in the case, I have reviewed the CRNA assessment, if present, and agree  Risks/benefits and alternatives discussed with patient including possible PONV, sore throat, and possibility of rare anesthetic and surgical emergencies

## 2020-03-24 ENCOUNTER — HOSPITAL ENCOUNTER (OUTPATIENT)
Facility: HOSPITAL | Age: 70
Setting detail: OUTPATIENT SURGERY
Discharge: HOME | End: 2020-03-24
Attending: SURGERY | Admitting: SURGERY
Payer: COMMERCIAL

## 2020-03-24 ENCOUNTER — ANESTHESIA (OUTPATIENT)
Dept: PERIOP | Facility: HOSPITAL | Age: 70
End: 2020-03-24
Payer: COMMERCIAL

## 2020-03-24 VITALS
RESPIRATION RATE: 20 BRPM | BODY MASS INDEX: 33.04 KG/M2 | WEIGHT: 218 LBS | HEART RATE: 64 BPM | HEIGHT: 68 IN | SYSTOLIC BLOOD PRESSURE: 112 MMHG | OXYGEN SATURATION: 94 % | TEMPERATURE: 97.2 F | DIASTOLIC BLOOD PRESSURE: 50 MMHG

## 2020-03-24 DIAGNOSIS — D48.4 NEOPLASM OF UNCERTAIN BEHAVIOR OF MESENTERY: ICD-10-CM

## 2020-03-24 PROCEDURE — 88305 TISSUE EXAM BY PATHOLOGIST: CPT | Performed by: PATHOLOGY

## 2020-03-24 PROCEDURE — 88341 IMHCHEM/IMCYTCHM EA ADD ANTB: CPT | Performed by: PATHOLOGY

## 2020-03-24 PROCEDURE — 88342 IMHCHEM/IMCYTCHM 1ST ANTB: CPT | Performed by: PATHOLOGY

## 2020-03-24 PROCEDURE — 88333 PATH CONSLTJ SURG CYTO XM 1: CPT | Performed by: PATHOLOGY

## 2020-03-24 PROCEDURE — 88185 FLOWCYTOMETRY/TC ADD-ON: CPT | Performed by: ANESTHESIOLOGY

## 2020-03-24 PROCEDURE — 49321 LAPAROSCOPY BIOPSY: CPT | Performed by: PHYSICIAN ASSISTANT

## 2020-03-24 PROCEDURE — 49321 LAPAROSCOPY BIOPSY: CPT | Performed by: SURGERY

## 2020-03-24 RX ORDER — MAGNESIUM HYDROXIDE 1200 MG/15ML
LIQUID ORAL AS NEEDED
Status: DISCONTINUED | OUTPATIENT
Start: 2020-03-24 | End: 2020-03-24 | Stop reason: HOSPADM

## 2020-03-24 RX ORDER — PROPOFOL 10 MG/ML
INJECTION, EMULSION INTRAVENOUS AS NEEDED
Status: DISCONTINUED | OUTPATIENT
Start: 2020-03-24 | End: 2020-03-24 | Stop reason: SURG

## 2020-03-24 RX ORDER — OXYCODONE HYDROCHLORIDE AND ACETAMINOPHEN 5; 325 MG/1; MG/1
1 TABLET ORAL EVERY 6 HOURS PRN
Status: DISCONTINUED | OUTPATIENT
Start: 2020-03-24 | End: 2020-03-24 | Stop reason: HOSPADM

## 2020-03-24 RX ORDER — FENTANYL CITRATE 50 UG/ML
INJECTION, SOLUTION INTRAMUSCULAR; INTRAVENOUS AS NEEDED
Status: DISCONTINUED | OUTPATIENT
Start: 2020-03-24 | End: 2020-03-24 | Stop reason: SURG

## 2020-03-24 RX ORDER — SODIUM CHLORIDE, SODIUM LACTATE, POTASSIUM CHLORIDE, CALCIUM CHLORIDE 600; 310; 30; 20 MG/100ML; MG/100ML; MG/100ML; MG/100ML
125 INJECTION, SOLUTION INTRAVENOUS CONTINUOUS
Status: DISCONTINUED | OUTPATIENT
Start: 2020-03-24 | End: 2020-03-24

## 2020-03-24 RX ORDER — METOCLOPRAMIDE HYDROCHLORIDE 5 MG/ML
10 INJECTION INTRAMUSCULAR; INTRAVENOUS ONCE AS NEEDED
Status: DISCONTINUED | OUTPATIENT
Start: 2020-03-24 | End: 2020-03-24 | Stop reason: HOSPADM

## 2020-03-24 RX ORDER — GLYCOPYRROLATE 0.2 MG/ML
INJECTION INTRAMUSCULAR; INTRAVENOUS AS NEEDED
Status: DISCONTINUED | OUTPATIENT
Start: 2020-03-24 | End: 2020-03-24 | Stop reason: SURG

## 2020-03-24 RX ORDER — HYDROMORPHONE HCL/PF 1 MG/ML
0.2 SYRINGE (ML) INJECTION
Status: DISCONTINUED | OUTPATIENT
Start: 2020-03-24 | End: 2020-03-24 | Stop reason: HOSPADM

## 2020-03-24 RX ORDER — SODIUM CHLORIDE, SODIUM LACTATE, POTASSIUM CHLORIDE, CALCIUM CHLORIDE 600; 310; 30; 20 MG/100ML; MG/100ML; MG/100ML; MG/100ML
125 INJECTION, SOLUTION INTRAVENOUS CONTINUOUS
Status: DISCONTINUED | OUTPATIENT
Start: 2020-03-24 | End: 2020-03-24 | Stop reason: HOSPADM

## 2020-03-24 RX ORDER — OXYCODONE HYDROCHLORIDE AND ACETAMINOPHEN 5; 325 MG/1; MG/1
1 TABLET ORAL EVERY 6 HOURS PRN
Qty: 8 TABLET | Refills: 0 | Status: SHIPPED | OUTPATIENT
Start: 2020-03-24 | End: 2020-04-03

## 2020-03-24 RX ORDER — SODIUM CHLORIDE, SODIUM LACTATE, POTASSIUM CHLORIDE, CALCIUM CHLORIDE 600; 310; 30; 20 MG/100ML; MG/100ML; MG/100ML; MG/100ML
125 INJECTION, SOLUTION INTRAVENOUS CONTINUOUS
Status: ACTIVE | OUTPATIENT
Start: 2020-03-24 | End: 2020-03-24

## 2020-03-24 RX ORDER — SODIUM CHLORIDE, SODIUM LACTATE, POTASSIUM CHLORIDE, CALCIUM CHLORIDE 600; 310; 30; 20 MG/100ML; MG/100ML; MG/100ML; MG/100ML
75 INJECTION, SOLUTION INTRAVENOUS CONTINUOUS
Status: DISCONTINUED | OUTPATIENT
Start: 2020-03-24 | End: 2020-03-24 | Stop reason: HOSPADM

## 2020-03-24 RX ORDER — ONDANSETRON 2 MG/ML
4 INJECTION INTRAMUSCULAR; INTRAVENOUS ONCE AS NEEDED
Status: DISCONTINUED | OUTPATIENT
Start: 2020-03-24 | End: 2020-03-24 | Stop reason: HOSPADM

## 2020-03-24 RX ORDER — HYDROMORPHONE HCL/PF 1 MG/ML
0.5 SYRINGE (ML) INJECTION
Status: DISCONTINUED | OUTPATIENT
Start: 2020-03-24 | End: 2020-03-24 | Stop reason: HOSPADM

## 2020-03-24 RX ORDER — LIDOCAINE HYDROCHLORIDE 10 MG/ML
0.5 INJECTION, SOLUTION EPIDURAL; INFILTRATION; INTRACAUDAL; PERINEURAL ONCE AS NEEDED
Status: DISCONTINUED | OUTPATIENT
Start: 2020-03-24 | End: 2020-03-24 | Stop reason: HOSPADM

## 2020-03-24 RX ORDER — ONDANSETRON 2 MG/ML
INJECTION INTRAMUSCULAR; INTRAVENOUS AS NEEDED
Status: DISCONTINUED | OUTPATIENT
Start: 2020-03-24 | End: 2020-03-24 | Stop reason: SURG

## 2020-03-24 RX ORDER — MIDAZOLAM HYDROCHLORIDE 2 MG/2ML
INJECTION, SOLUTION INTRAMUSCULAR; INTRAVENOUS AS NEEDED
Status: DISCONTINUED | OUTPATIENT
Start: 2020-03-24 | End: 2020-03-24 | Stop reason: SURG

## 2020-03-24 RX ORDER — ROCURONIUM BROMIDE 10 MG/ML
INJECTION, SOLUTION INTRAVENOUS AS NEEDED
Status: DISCONTINUED | OUTPATIENT
Start: 2020-03-24 | End: 2020-03-24 | Stop reason: SURG

## 2020-03-24 RX ORDER — BUPIVACAINE HYDROCHLORIDE 5 MG/ML
INJECTION, SOLUTION EPIDURAL; INTRACAUDAL AS NEEDED
Status: DISCONTINUED | OUTPATIENT
Start: 2020-03-24 | End: 2020-03-24 | Stop reason: HOSPADM

## 2020-03-24 RX ORDER — EPHEDRINE SULFATE 50 MG/ML
INJECTION INTRAVENOUS AS NEEDED
Status: DISCONTINUED | OUTPATIENT
Start: 2020-03-24 | End: 2020-03-24 | Stop reason: SURG

## 2020-03-24 RX ORDER — CEFAZOLIN SODIUM 2 G/50ML
2000 SOLUTION INTRAVENOUS ONCE
Status: COMPLETED | OUTPATIENT
Start: 2020-03-24 | End: 2020-03-24

## 2020-03-24 RX ORDER — KETAMINE HYDROCHLORIDE 50 MG/ML
INJECTION, SOLUTION, CONCENTRATE INTRAMUSCULAR; INTRAVENOUS AS NEEDED
Status: DISCONTINUED | OUTPATIENT
Start: 2020-03-24 | End: 2020-03-24 | Stop reason: SURG

## 2020-03-24 RX ORDER — ACETAMINOPHEN 325 MG/1
650 TABLET ORAL EVERY 6 HOURS PRN
Status: DISCONTINUED | OUTPATIENT
Start: 2020-03-24 | End: 2020-03-24 | Stop reason: HOSPADM

## 2020-03-24 RX ORDER — LEVALBUTEROL INHALATION SOLUTION 0.63 MG/3ML
0.63 SOLUTION RESPIRATORY (INHALATION) EVERY 8 HOURS PRN
Status: DISCONTINUED | OUTPATIENT
Start: 2020-03-24 | End: 2020-03-24 | Stop reason: HOSPADM

## 2020-03-24 RX ORDER — LIDOCAINE HYDROCHLORIDE 10 MG/ML
INJECTION, SOLUTION EPIDURAL; INFILTRATION; INTRACAUDAL; PERINEURAL AS NEEDED
Status: DISCONTINUED | OUTPATIENT
Start: 2020-03-24 | End: 2020-03-24 | Stop reason: SURG

## 2020-03-24 RX ORDER — OXYCODONE HYDROCHLORIDE AND ACETAMINOPHEN 5; 325 MG/1; MG/1
2 TABLET ORAL EVERY 6 HOURS PRN
Status: DISCONTINUED | OUTPATIENT
Start: 2020-03-24 | End: 2020-03-24 | Stop reason: HOSPADM

## 2020-03-24 RX ORDER — DIPHENHYDRAMINE HYDROCHLORIDE 50 MG/ML
12.5 INJECTION INTRAMUSCULAR; INTRAVENOUS ONCE AS NEEDED
Status: DISCONTINUED | OUTPATIENT
Start: 2020-03-24 | End: 2020-03-24 | Stop reason: HOSPADM

## 2020-03-24 RX ORDER — NEOSTIGMINE METHYLSULFATE 1 MG/ML
INJECTION INTRAVENOUS AS NEEDED
Status: DISCONTINUED | OUTPATIENT
Start: 2020-03-24 | End: 2020-03-24 | Stop reason: SURG

## 2020-03-24 RX ORDER — DEXAMETHASONE SODIUM PHOSPHATE 10 MG/ML
INJECTION, SOLUTION INTRAMUSCULAR; INTRAVENOUS AS NEEDED
Status: DISCONTINUED | OUTPATIENT
Start: 2020-03-24 | End: 2020-03-24 | Stop reason: SURG

## 2020-03-24 RX ORDER — ONDANSETRON 2 MG/ML
4 INJECTION INTRAMUSCULAR; INTRAVENOUS EVERY 6 HOURS PRN
Status: DISCONTINUED | OUTPATIENT
Start: 2020-03-24 | End: 2020-03-24 | Stop reason: HOSPADM

## 2020-03-24 RX ORDER — FENTANYL CITRATE/PF 50 MCG/ML
50 SYRINGE (ML) INJECTION
Status: DISCONTINUED | OUTPATIENT
Start: 2020-03-24 | End: 2020-03-24 | Stop reason: HOSPADM

## 2020-03-24 RX ADMIN — SODIUM CHLORIDE, SODIUM LACTATE, POTASSIUM CHLORIDE, AND CALCIUM CHLORIDE: .6; .31; .03; .02 INJECTION, SOLUTION INTRAVENOUS at 10:07

## 2020-03-24 RX ADMIN — EPHEDRINE SULFATE 10 MG: 50 INJECTION, SOLUTION INTRAVENOUS at 09:31

## 2020-03-24 RX ADMIN — EPHEDRINE SULFATE 10 MG: 50 INJECTION, SOLUTION INTRAVENOUS at 09:28

## 2020-03-24 RX ADMIN — NEOSTIGMINE METHYLSULFATE 3 MG: 1 INJECTION, SOLUTION INTRAVENOUS at 10:30

## 2020-03-24 RX ADMIN — EPHEDRINE SULFATE 10 MG: 50 INJECTION, SOLUTION INTRAVENOUS at 09:34

## 2020-03-24 RX ADMIN — CEFAZOLIN SODIUM 2000 MG: 2 SOLUTION INTRAVENOUS at 09:11

## 2020-03-24 RX ADMIN — PROPOFOL 150 MG: 10 INJECTION, EMULSION INTRAVENOUS at 09:18

## 2020-03-24 RX ADMIN — EPHEDRINE SULFATE 10 MG: 50 INJECTION, SOLUTION INTRAVENOUS at 09:26

## 2020-03-24 RX ADMIN — LIDOCAINE HYDROCHLORIDE 100 MG: 10 INJECTION, SOLUTION EPIDURAL; INFILTRATION; INTRACAUDAL; PERINEURAL at 09:18

## 2020-03-24 RX ADMIN — GLYCOPYRROLATE 0.2 MG: 0.2 INJECTION, SOLUTION INTRAMUSCULAR; INTRAVENOUS at 09:32

## 2020-03-24 RX ADMIN — DEXAMETHASONE SODIUM PHOSPHATE 10 MG: 10 INJECTION, SOLUTION INTRAMUSCULAR; INTRAVENOUS at 09:33

## 2020-03-24 RX ADMIN — FENTANYL CITRATE 50 MCG: 50 INJECTION, SOLUTION INTRAMUSCULAR; INTRAVENOUS at 11:19

## 2020-03-24 RX ADMIN — FENTANYL CITRATE 50 MCG: 50 INJECTION, SOLUTION INTRAMUSCULAR; INTRAVENOUS at 11:01

## 2020-03-24 RX ADMIN — EPHEDRINE SULFATE 10 MG: 50 INJECTION, SOLUTION INTRAVENOUS at 09:37

## 2020-03-24 RX ADMIN — ONDANSETRON 4 MG: 2 INJECTION INTRAMUSCULAR; INTRAVENOUS at 10:20

## 2020-03-24 RX ADMIN — KETAMINE HYDROCHLORIDE 50 MG: 50 INJECTION, SOLUTION INTRAMUSCULAR; INTRAVENOUS at 09:40

## 2020-03-24 RX ADMIN — FENTANYL CITRATE 100 MCG: 50 INJECTION INTRAMUSCULAR; INTRAVENOUS at 09:18

## 2020-03-24 RX ADMIN — MIDAZOLAM HYDROCHLORIDE 2 MG: 1 INJECTION, SOLUTION INTRAMUSCULAR; INTRAVENOUS at 09:12

## 2020-03-24 RX ADMIN — ROCURONIUM BROMIDE 50 MG: 10 INJECTION INTRAVENOUS at 09:18

## 2020-03-24 RX ADMIN — EPHEDRINE SULFATE 10 MG: 50 INJECTION, SOLUTION INTRAVENOUS at 09:24

## 2020-03-24 RX ADMIN — EPHEDRINE SULFATE 10 MG: 50 INJECTION, SOLUTION INTRAVENOUS at 09:21

## 2020-03-24 RX ADMIN — SODIUM CHLORIDE, SODIUM LACTATE, POTASSIUM CHLORIDE, AND CALCIUM CHLORIDE 125 ML/HR: .6; .31; .03; .02 INJECTION, SOLUTION INTRAVENOUS at 08:19

## 2020-03-24 RX ADMIN — GLYCOPYRROLATE 0.4 MG: 0.2 INJECTION, SOLUTION INTRAMUSCULAR; INTRAVENOUS at 10:30

## 2020-03-24 RX ADMIN — OXYCODONE HYDROCHLORIDE AND ACETAMINOPHEN 1 TABLET: 5; 325 TABLET ORAL at 11:58

## 2020-03-24 NOTE — ANESTHESIA POSTPROCEDURE EVALUATION
Post-Op Assessment Note    CV Status:  Stable  Pain Score: 0    Pain management: adequate     Mental Status:  Arousable   Hydration Status:  Stable   PONV Controlled:  None   Airway Patency:  Patent   Post Op Vitals Reviewed: Yes      Staff: CRNA           BP   117/67   Temp   97 5   Pulse  69   Resp   17   SpO2  98%

## 2020-03-25 ENCOUNTER — TELEPHONE (OUTPATIENT)
Dept: SURGERY | Facility: CLINIC | Age: 70
End: 2020-03-25

## 2020-03-25 NOTE — TELEPHONE ENCOUNTER
Call to checkup on the patient following her diagnostic laparoscopy and laparoscopic biopsy of her mesenteric mass  I left a message on her voicemail with contact information for her to follow-up

## 2020-03-26 ENCOUNTER — TELEPHONE (OUTPATIENT)
Dept: SURGERY | Facility: CLINIC | Age: 70
End: 2020-03-26

## 2020-03-26 DIAGNOSIS — D48.4 NEOPLASM OF UNCERTAIN BEHAVIOR OF MESENTERY: Primary | ICD-10-CM

## 2020-03-26 LAB — SCAN RESULT: NORMAL

## 2020-03-26 NOTE — PROGRESS NOTES
Case discussed with Dr Jan Bender of Surgical Oncology  Will order dedicated PET scan and chromogranin level after which will make additional diagnostic and therapeutic treatment recommendations accordingly

## 2020-03-26 NOTE — TELEPHONE ENCOUNTER
Patient contacted by phone  Notified regarding the new diagnosis of carcinoid of the small bowel mesentery  I explained to the best of my ability the condition  I have explained that additional testing will be necessary in order to define the primary source of the lesion which is likely metastatic to the small bowel mesentery  All questions were answered to her satisfaction  I have put a call into Dr Vitor Boothe of Surgical Oncology  I await his return call to formulate our diagnostic workup plan

## 2020-03-30 PROBLEM — C7B.09 CARCINOID TUMOR METASTATIC TO INTRA-ABDOMINAL LYMPH NODE (HCC): Status: ACTIVE | Noted: 2020-03-11

## 2020-03-30 PROBLEM — C7A.00 CARCINOID TUMOR METASTATIC TO INTRA-ABDOMINAL LYMPH NODE (HCC): Status: ACTIVE | Noted: 2020-03-11

## 2020-04-01 ENCOUNTER — TELEPHONE (OUTPATIENT)
Dept: SURGERY | Facility: CLINIC | Age: 70
End: 2020-04-01

## 2020-04-06 ENCOUNTER — APPOINTMENT (OUTPATIENT)
Dept: LAB | Facility: CLINIC | Age: 70
End: 2020-04-06
Payer: COMMERCIAL

## 2020-04-06 DIAGNOSIS — D48.4 NEOPLASM OF UNCERTAIN BEHAVIOR OF MESENTERY: ICD-10-CM

## 2020-04-06 PROCEDURE — 86316 IMMUNOASSAY TUMOR OTHER: CPT

## 2020-04-09 ENCOUNTER — TELEPHONE (OUTPATIENT)
Dept: SURGERY | Facility: CLINIC | Age: 70
End: 2020-04-09

## 2020-04-09 ENCOUNTER — HOSPITAL ENCOUNTER (OUTPATIENT)
Dept: RADIOLOGY | Age: 70
Discharge: HOME | End: 2020-04-09
Payer: COMMERCIAL

## 2020-04-09 ENCOUNTER — TELEPHONE (OUTPATIENT)
Dept: HEMATOLOGY ONCOLOGY | Facility: CLINIC | Age: 70
End: 2020-04-09

## 2020-04-09 DIAGNOSIS — C7A.00 CARCINOID TUMOR METASTATIC TO INTRA-ABDOMINAL LYMPH NODE (HCC): ICD-10-CM

## 2020-04-09 DIAGNOSIS — C7B.09 CARCINOID TUMOR METASTATIC TO INTRA-ABDOMINAL LYMPH NODE (HCC): ICD-10-CM

## 2020-04-09 DIAGNOSIS — C7B.8 MESENTERY METASTASIS OF NEUROENDOCRINE TUMOR (HCC): ICD-10-CM

## 2020-04-09 DIAGNOSIS — I16.0 HYPERTENSIVE URGENCY: ICD-10-CM

## 2020-04-09 DIAGNOSIS — D3A.019 CARCINOID TUMOR OF SMALL INTESTINE: Primary | ICD-10-CM

## 2020-04-09 DIAGNOSIS — C7A.8 MESENTERY METASTASIS OF NEUROENDOCRINE TUMOR (HCC): ICD-10-CM

## 2020-04-09 LAB — CGA SERPL-MCNC: 72.4 NG/ML (ref 0–101.8)

## 2020-04-09 PROCEDURE — 78815 PET IMAGE W/CT SKULL-THIGH: CPT

## 2020-04-09 PROCEDURE — A9587 GALLIUM GA-68: HCPCS

## 2020-04-10 ENCOUNTER — TRANSCRIBE ORDERS (OUTPATIENT)
Dept: ADMINISTRATIVE | Facility: HOSPITAL | Age: 70
End: 2020-04-10

## 2020-04-13 ENCOUNTER — APPOINTMENT (OUTPATIENT)
Dept: LAB | Facility: CLINIC | Age: 70
End: 2020-04-13
Payer: COMMERCIAL

## 2020-04-15 PROBLEM — E27.9 ADRENAL NODULE (HCC): Status: ACTIVE | Noted: 2020-04-15

## 2020-04-15 PROBLEM — E27.8 ADRENAL NODULE (HCC): Status: ACTIVE | Noted: 2020-04-15

## 2020-04-16 ENCOUNTER — TELEPHONE (OUTPATIENT)
Dept: SURGICAL ONCOLOGY | Facility: CLINIC | Age: 70
End: 2020-04-16

## 2020-04-16 ENCOUNTER — OFFICE VISIT (OUTPATIENT)
Dept: SURGERY | Facility: CLINIC | Age: 70
End: 2020-04-16

## 2020-04-16 VITALS
TEMPERATURE: 98.3 F | HEIGHT: 68 IN | RESPIRATION RATE: 18 BRPM | BODY MASS INDEX: 33.04 KG/M2 | WEIGHT: 218 LBS | SYSTOLIC BLOOD PRESSURE: 132 MMHG | HEART RATE: 68 BPM | DIASTOLIC BLOOD PRESSURE: 74 MMHG

## 2020-04-16 DIAGNOSIS — C7A.00 CARCINOID TUMOR METASTATIC TO INTRA-ABDOMINAL LYMPH NODE (HCC): ICD-10-CM

## 2020-04-16 DIAGNOSIS — E27.8 ADRENAL NODULE (HCC): ICD-10-CM

## 2020-04-16 DIAGNOSIS — C7A.019 MALIGNANT CARCINOID TUMOR OF SMALL INTESTINE, UNSPECIFIED LOCATION (HCC): Primary | ICD-10-CM

## 2020-04-16 DIAGNOSIS — C7B.09 CARCINOID TUMOR METASTATIC TO INTRA-ABDOMINAL LYMPH NODE (HCC): ICD-10-CM

## 2020-04-16 PROCEDURE — 99024 POSTOP FOLLOW-UP VISIT: CPT | Performed by: SURGERY

## 2020-04-17 ENCOUNTER — APPOINTMENT (OUTPATIENT)
Dept: LAB | Facility: HOSPITAL | Age: 70
End: 2020-04-17
Attending: SURGERY
Payer: COMMERCIAL

## 2020-04-17 ENCOUNTER — CONSULT (OUTPATIENT)
Dept: SURGICAL ONCOLOGY | Facility: CLINIC | Age: 70
End: 2020-04-17
Payer: COMMERCIAL

## 2020-04-17 VITALS
WEIGHT: 219 LBS | HEIGHT: 68 IN | TEMPERATURE: 97 F | DIASTOLIC BLOOD PRESSURE: 90 MMHG | BODY MASS INDEX: 33.19 KG/M2 | RESPIRATION RATE: 16 BRPM | HEART RATE: 64 BPM | SYSTOLIC BLOOD PRESSURE: 142 MMHG

## 2020-04-17 DIAGNOSIS — E27.8 ADRENAL NODULE (HCC): ICD-10-CM

## 2020-04-17 DIAGNOSIS — C7A.00 CARCINOID TUMOR METASTATIC TO INTRA-ABDOMINAL LYMPH NODE (HCC): ICD-10-CM

## 2020-04-17 DIAGNOSIS — C7B.09 CARCINOID TUMOR METASTATIC TO INTRA-ABDOMINAL LYMPH NODE (HCC): ICD-10-CM

## 2020-04-17 DIAGNOSIS — D3A.019 CARCINOID TUMOR OF SMALL INTESTINE: ICD-10-CM

## 2020-04-17 DIAGNOSIS — C7A.019 MALIGNANT CARCINOID TUMOR OF SMALL INTESTINE, UNSPECIFIED LOCATION (HCC): Primary | ICD-10-CM

## 2020-04-17 DIAGNOSIS — C7A.019 MALIGNANT CARCINOID TUMOR OF SMALL INTESTINE, UNSPECIFIED LOCATION (HCC): ICD-10-CM

## 2020-04-17 LAB
ABO GROUP BLD: NORMAL
ALBUMIN SERPL BCP-MCNC: 4 G/DL (ref 3.5–5)
ALP SERPL-CCNC: 66 U/L (ref 46–116)
ALT SERPL W P-5'-P-CCNC: 32 U/L (ref 12–78)
ANION GAP SERPL CALCULATED.3IONS-SCNC: 7 MMOL/L (ref 4–13)
APTT PPP: 31 SECONDS (ref 23–37)
AST SERPL W P-5'-P-CCNC: 21 U/L (ref 5–45)
BASOPHILS # BLD AUTO: 0.05 THOUSANDS/ΜL (ref 0–0.1)
BASOPHILS NFR BLD AUTO: 1 % (ref 0–1)
BILIRUB SERPL-MCNC: 0.45 MG/DL (ref 0.2–1)
BLD GP AB SCN SERPL QL: NEGATIVE
BUN SERPL-MCNC: 14 MG/DL (ref 5–25)
CALCIUM SERPL-MCNC: 9.2 MG/DL (ref 8.3–10.1)
CHLORIDE SERPL-SCNC: 105 MMOL/L (ref 100–108)
CO2 SERPL-SCNC: 27 MMOL/L (ref 21–32)
CREAT SERPL-MCNC: 1.22 MG/DL (ref 0.6–1.3)
EOSINOPHIL # BLD AUTO: 0.1 THOUSAND/ΜL (ref 0–0.61)
EOSINOPHIL NFR BLD AUTO: 2 % (ref 0–6)
ERYTHROCYTE [DISTWIDTH] IN BLOOD BY AUTOMATED COUNT: 13 % (ref 11.6–15.1)
EST. AVERAGE GLUCOSE BLD GHB EST-MCNC: 117 MG/DL
GFR SERPL CREATININE-BSD FRML MDRD: 46 ML/MIN/1.73SQ M
GLUCOSE SERPL-MCNC: 99 MG/DL (ref 65–140)
HBA1C MFR BLD: 5.7 %
HCT VFR BLD AUTO: 38.9 % (ref 34.8–46.1)
HGB BLD-MCNC: 12.8 G/DL (ref 11.5–15.4)
IMM GRANULOCYTES # BLD AUTO: 0.02 THOUSAND/UL (ref 0–0.2)
IMM GRANULOCYTES NFR BLD AUTO: 0 % (ref 0–2)
INR PPP: 0.94 (ref 0.84–1.19)
LYMPHOCYTES # BLD AUTO: 1.67 THOUSANDS/ΜL (ref 0.6–4.47)
LYMPHOCYTES NFR BLD AUTO: 27 % (ref 14–44)
MCH RBC QN AUTO: 29.3 PG (ref 26.8–34.3)
MCHC RBC AUTO-ENTMCNC: 32.9 G/DL (ref 31.4–37.4)
MCV RBC AUTO: 89 FL (ref 82–98)
MONOCYTES # BLD AUTO: 0.5 THOUSAND/ΜL (ref 0.17–1.22)
MONOCYTES NFR BLD AUTO: 8 % (ref 4–12)
NEUTROPHILS # BLD AUTO: 3.77 THOUSANDS/ΜL (ref 1.85–7.62)
NEUTS SEG NFR BLD AUTO: 62 % (ref 43–75)
NRBC BLD AUTO-RTO: 0 /100 WBCS
PLATELET # BLD AUTO: 411 THOUSANDS/UL (ref 149–390)
PMV BLD AUTO: 9 FL (ref 8.9–12.7)
POTASSIUM SERPL-SCNC: 4.5 MMOL/L (ref 3.5–5.3)
PROT SERPL-MCNC: 7.8 G/DL (ref 6.4–8.2)
PROTHROMBIN TIME: 12.2 SECONDS (ref 11.6–14.5)
RBC # BLD AUTO: 4.37 MILLION/UL (ref 3.81–5.12)
RH BLD: NEGATIVE
SODIUM SERPL-SCNC: 139 MMOL/L (ref 136–145)
SPECIMEN EXPIRATION DATE: NORMAL
WBC # BLD AUTO: 6.11 THOUSAND/UL (ref 4.31–10.16)

## 2020-04-17 PROCEDURE — 80053 COMPREHEN METABOLIC PANEL: CPT

## 2020-04-17 PROCEDURE — 86901 BLOOD TYPING SEROLOGIC RH(D): CPT

## 2020-04-17 PROCEDURE — 99205 OFFICE O/P NEW HI 60 MIN: CPT | Performed by: SURGERY

## 2020-04-17 PROCEDURE — 36415 COLL VENOUS BLD VENIPUNCTURE: CPT

## 2020-04-17 PROCEDURE — 83036 HEMOGLOBIN GLYCOSYLATED A1C: CPT

## 2020-04-17 PROCEDURE — 85730 THROMBOPLASTIN TIME PARTIAL: CPT

## 2020-04-17 PROCEDURE — 85025 COMPLETE CBC W/AUTO DIFF WBC: CPT

## 2020-04-17 PROCEDURE — 86850 RBC ANTIBODY SCREEN: CPT

## 2020-04-17 PROCEDURE — 86900 BLOOD TYPING SEROLOGIC ABO: CPT

## 2020-04-17 PROCEDURE — 85610 PROTHROMBIN TIME: CPT

## 2020-04-17 RX ORDER — CEFAZOLIN SODIUM 2 G/50ML
2000 SOLUTION INTRAVENOUS ONCE
Status: CANCELLED | OUTPATIENT
Start: 2020-04-17 | End: 2020-04-17

## 2020-04-23 ENCOUNTER — ANESTHESIA EVENT (OUTPATIENT)
Dept: PERIOP | Facility: HOSPITAL | Age: 70
DRG: 330 | End: 2020-04-23
Payer: COMMERCIAL

## 2020-04-27 ENCOUNTER — TELEPHONE (OUTPATIENT)
Dept: NEPHROLOGY | Facility: CLINIC | Age: 70
End: 2020-04-27

## 2020-04-29 ENCOUNTER — ANESTHESIA (OUTPATIENT)
Dept: PERIOP | Facility: HOSPITAL | Age: 70
DRG: 330 | End: 2020-04-29
Payer: COMMERCIAL

## 2020-04-29 ENCOUNTER — HOSPITAL ENCOUNTER (INPATIENT)
Facility: HOSPITAL | Age: 70
LOS: 3 days | Discharge: HOME | DRG: 330 | End: 2020-05-02
Attending: SURGERY | Admitting: SURGERY
Payer: COMMERCIAL

## 2020-04-29 DIAGNOSIS — I10 ESSENTIAL HYPERTENSION: ICD-10-CM

## 2020-04-29 DIAGNOSIS — C7A.00 CARCINOID TUMOR METASTATIC TO INTRA-ABDOMINAL LYMPH NODE (HCC): ICD-10-CM

## 2020-04-29 DIAGNOSIS — C7B.09 CARCINOID TUMOR METASTATIC TO INTRA-ABDOMINAL LYMPH NODE (HCC): ICD-10-CM

## 2020-04-29 DIAGNOSIS — N18.30 STAGE 3 CHRONIC KIDNEY DISEASE (HCC): Primary | ICD-10-CM

## 2020-04-29 DIAGNOSIS — C7A.019 MALIGNANT CARCINOID TUMOR OF SMALL INTESTINE, UNSPECIFIED LOCATION (HCC): ICD-10-CM

## 2020-04-29 LAB
ABO GROUP BLD: NORMAL
ANION GAP SERPL CALCULATED.3IONS-SCNC: 9 MMOL/L (ref 4–13)
BLD GP AB SCN SERPL QL: NEGATIVE
BUN SERPL-MCNC: 13 MG/DL (ref 5–25)
CALCIUM SERPL-MCNC: 8.5 MG/DL (ref 8.3–10.1)
CHLORIDE SERPL-SCNC: 109 MMOL/L (ref 100–108)
CO2 SERPL-SCNC: 22 MMOL/L (ref 21–32)
CREAT SERPL-MCNC: 1.11 MG/DL (ref 0.6–1.3)
GFR SERPL CREATININE-BSD FRML MDRD: 51 ML/MIN/1.73SQ M
GLUCOSE SERPL-MCNC: 107 MG/DL (ref 65–140)
GLUCOSE SERPL-MCNC: 130 MG/DL (ref 65–140)
GLUCOSE SERPL-MCNC: 137 MG/DL (ref 65–140)
GLUCOSE SERPL-MCNC: 145 MG/DL (ref 65–140)
LACTATE SERPL-SCNC: 1.1 MMOL/L (ref 0.5–2)
PLATELET # BLD AUTO: 347 THOUSANDS/UL (ref 149–390)
PMV BLD AUTO: 9.4 FL (ref 8.9–12.7)
POTASSIUM SERPL-SCNC: 4.2 MMOL/L (ref 3.5–5.3)
RH BLD: NEGATIVE
SODIUM SERPL-SCNC: 140 MMOL/L (ref 136–145)
SPECIMEN EXPIRATION DATE: NORMAL

## 2020-04-29 PROCEDURE — 88341 IMHCHEM/IMCYTCHM EA ADD ANTB: CPT | Performed by: PATHOLOGY

## 2020-04-29 PROCEDURE — 80048 BASIC METABOLIC PNL TOTAL CA: CPT | Performed by: INTERNAL MEDICINE

## 2020-04-29 PROCEDURE — 85049 AUTOMATED PLATELET COUNT: CPT | Performed by: SURGERY

## 2020-04-29 PROCEDURE — 86901 BLOOD TYPING SEROLOGIC RH(D): CPT | Performed by: SURGERY

## 2020-04-29 PROCEDURE — 0DB80ZZ EXCISION OF SMALL INTESTINE, OPEN APPROACH: ICD-10-PCS | Performed by: SURGERY

## 2020-04-29 PROCEDURE — 88342 IMHCHEM/IMCYTCHM 1ST ANTB: CPT | Performed by: PATHOLOGY

## 2020-04-29 PROCEDURE — 83605 ASSAY OF LACTIC ACID: CPT | Performed by: INTERNAL MEDICINE

## 2020-04-29 PROCEDURE — C9290 INJ, BUPIVACAINE LIPOSOME: HCPCS | Performed by: STUDENT IN AN ORGANIZED HEALTH CARE EDUCATION/TRAINING PROGRAM

## 2020-04-29 PROCEDURE — 88309 TISSUE EXAM BY PATHOLOGIST: CPT | Performed by: PATHOLOGY

## 2020-04-29 PROCEDURE — 82948 REAGENT STRIP/BLOOD GLUCOSE: CPT

## 2020-04-29 PROCEDURE — 99223 1ST HOSP IP/OBS HIGH 75: CPT | Performed by: INTERNAL MEDICINE

## 2020-04-29 PROCEDURE — 86900 BLOOD TYPING SEROLOGIC ABO: CPT | Performed by: SURGERY

## 2020-04-29 PROCEDURE — 44120 REMOVAL OF SMALL INTESTINE: CPT | Performed by: SURGERY

## 2020-04-29 PROCEDURE — 86850 RBC ANTIBODY SCREEN: CPT | Performed by: SURGERY

## 2020-04-29 RX ORDER — OXYCODONE HYDROCHLORIDE 10 MG/1
10 TABLET ORAL EVERY 4 HOURS PRN
Status: DISCONTINUED | OUTPATIENT
Start: 2020-04-29 | End: 2020-05-02 | Stop reason: HOSPADM

## 2020-04-29 RX ORDER — HYDROMORPHONE HCL/PF 1 MG/ML
0.2 SYRINGE (ML) INJECTION
Status: DISCONTINUED | OUTPATIENT
Start: 2020-04-29 | End: 2020-04-29 | Stop reason: HOSPADM

## 2020-04-29 RX ORDER — OXYCODONE HYDROCHLORIDE 5 MG/1
5 TABLET ORAL EVERY 4 HOURS PRN
Status: DISCONTINUED | OUTPATIENT
Start: 2020-04-29 | End: 2020-05-02 | Stop reason: HOSPADM

## 2020-04-29 RX ORDER — GLYCOPYRROLATE 0.2 MG/ML
INJECTION INTRAMUSCULAR; INTRAVENOUS AS NEEDED
Status: DISCONTINUED | OUTPATIENT
Start: 2020-04-29 | End: 2020-04-29 | Stop reason: SURG

## 2020-04-29 RX ORDER — ONDANSETRON 2 MG/ML
4 INJECTION INTRAMUSCULAR; INTRAVENOUS ONCE AS NEEDED
Status: DISCONTINUED | OUTPATIENT
Start: 2020-04-29 | End: 2020-04-29 | Stop reason: HOSPADM

## 2020-04-29 RX ORDER — CEFAZOLIN SODIUM 2 G/50ML
2000 SOLUTION INTRAVENOUS ONCE
Status: COMPLETED | OUTPATIENT
Start: 2020-04-29 | End: 2020-04-29

## 2020-04-29 RX ORDER — SUCCINYLCHOLINE/SOD CL,ISO/PF 100 MG/5ML
SYRINGE (ML) INTRAVENOUS AS NEEDED
Status: DISCONTINUED | OUTPATIENT
Start: 2020-04-29 | End: 2020-04-29 | Stop reason: SURG

## 2020-04-29 RX ORDER — EPHEDRINE SULFATE 50 MG/ML
INJECTION INTRAVENOUS AS NEEDED
Status: DISCONTINUED | OUTPATIENT
Start: 2020-04-29 | End: 2020-04-29 | Stop reason: SURG

## 2020-04-29 RX ORDER — ROCURONIUM BROMIDE 10 MG/ML
INJECTION, SOLUTION INTRAVENOUS AS NEEDED
Status: DISCONTINUED | OUTPATIENT
Start: 2020-04-29 | End: 2020-04-29 | Stop reason: SURG

## 2020-04-29 RX ORDER — HEPARIN SODIUM 5000 [USP'U]/ML
5000 INJECTION, SOLUTION INTRAVENOUS; SUBCUTANEOUS EVERY 8 HOURS SCHEDULED
Status: DISCONTINUED | OUTPATIENT
Start: 2020-04-29 | End: 2020-05-02 | Stop reason: HOSPADM

## 2020-04-29 RX ORDER — DEXAMETHASONE SODIUM PHOSPHATE 10 MG/ML
INJECTION, SOLUTION INTRAMUSCULAR; INTRAVENOUS AS NEEDED
Status: DISCONTINUED | OUTPATIENT
Start: 2020-04-29 | End: 2020-04-29 | Stop reason: SURG

## 2020-04-29 RX ORDER — PROPOFOL 10 MG/ML
INJECTION, EMULSION INTRAVENOUS AS NEEDED
Status: DISCONTINUED | OUTPATIENT
Start: 2020-04-29 | End: 2020-04-29 | Stop reason: SURG

## 2020-04-29 RX ORDER — NEOSTIGMINE METHYLSULFATE 1 MG/ML
INJECTION INTRAVENOUS AS NEEDED
Status: DISCONTINUED | OUTPATIENT
Start: 2020-04-29 | End: 2020-04-29 | Stop reason: SURG

## 2020-04-29 RX ORDER — ONDANSETRON 2 MG/ML
INJECTION INTRAMUSCULAR; INTRAVENOUS AS NEEDED
Status: DISCONTINUED | OUTPATIENT
Start: 2020-04-29 | End: 2020-04-29 | Stop reason: SURG

## 2020-04-29 RX ORDER — LIDOCAINE HYDROCHLORIDE 10 MG/ML
INJECTION, SOLUTION EPIDURAL; INFILTRATION; INTRACAUDAL; PERINEURAL AS NEEDED
Status: DISCONTINUED | OUTPATIENT
Start: 2020-04-29 | End: 2020-04-29 | Stop reason: SURG

## 2020-04-29 RX ORDER — SODIUM CHLORIDE, SODIUM LACTATE, POTASSIUM CHLORIDE, CALCIUM CHLORIDE 600; 310; 30; 20 MG/100ML; MG/100ML; MG/100ML; MG/100ML
100 INJECTION, SOLUTION INTRAVENOUS CONTINUOUS
Status: DISCONTINUED | OUTPATIENT
Start: 2020-04-29 | End: 2020-04-30

## 2020-04-29 RX ORDER — SODIUM CHLORIDE 9 MG/ML
INJECTION, SOLUTION INTRAVENOUS CONTINUOUS PRN
Status: DISCONTINUED | OUTPATIENT
Start: 2020-04-29 | End: 2020-04-29 | Stop reason: SURG

## 2020-04-29 RX ORDER — MAGNESIUM HYDROXIDE 1200 MG/15ML
LIQUID ORAL AS NEEDED
Status: DISCONTINUED | OUTPATIENT
Start: 2020-04-29 | End: 2020-04-29 | Stop reason: HOSPADM

## 2020-04-29 RX ORDER — FENTANYL CITRATE 50 UG/ML
INJECTION, SOLUTION INTRAMUSCULAR; INTRAVENOUS AS NEEDED
Status: DISCONTINUED | OUTPATIENT
Start: 2020-04-29 | End: 2020-04-29 | Stop reason: SURG

## 2020-04-29 RX ORDER — MIDAZOLAM HYDROCHLORIDE 2 MG/2ML
INJECTION, SOLUTION INTRAMUSCULAR; INTRAVENOUS AS NEEDED
Status: DISCONTINUED | OUTPATIENT
Start: 2020-04-29 | End: 2020-04-29 | Stop reason: SURG

## 2020-04-29 RX ORDER — HYDROMORPHONE HCL/PF 1 MG/ML
SYRINGE (ML) INJECTION AS NEEDED
Status: DISCONTINUED | OUTPATIENT
Start: 2020-04-29 | End: 2020-04-29 | Stop reason: SURG

## 2020-04-29 RX ORDER — ALBUMIN, HUMAN INJ 5% 5 %
SOLUTION INTRAVENOUS CONTINUOUS PRN
Status: DISCONTINUED | OUTPATIENT
Start: 2020-04-29 | End: 2020-04-29 | Stop reason: SURG

## 2020-04-29 RX ORDER — SODIUM CHLORIDE, SODIUM LACTATE, POTASSIUM CHLORIDE, CALCIUM CHLORIDE 600; 310; 30; 20 MG/100ML; MG/100ML; MG/100ML; MG/100ML
INJECTION, SOLUTION INTRAVENOUS CONTINUOUS PRN
Status: DISCONTINUED | OUTPATIENT
Start: 2020-04-29 | End: 2020-04-29 | Stop reason: SURG

## 2020-04-29 RX ORDER — HYDROMORPHONE HCL/PF 1 MG/ML
0.5 SYRINGE (ML) INJECTION EVERY 2 HOUR PRN
Status: DISCONTINUED | OUTPATIENT
Start: 2020-04-29 | End: 2020-05-02 | Stop reason: HOSPADM

## 2020-04-29 RX ORDER — ONDANSETRON 2 MG/ML
4 INJECTION INTRAMUSCULAR; INTRAVENOUS EVERY 6 HOURS PRN
Status: DISCONTINUED | OUTPATIENT
Start: 2020-04-29 | End: 2020-05-02 | Stop reason: HOSPADM

## 2020-04-29 RX ORDER — BUPIVACAINE HYDROCHLORIDE 2.5 MG/ML
INJECTION, SOLUTION EPIDURAL; INFILTRATION; INTRACAUDAL
Status: COMPLETED | OUTPATIENT
Start: 2020-04-29 | End: 2020-04-29

## 2020-04-29 RX ORDER — ACETAMINOPHEN 325 MG/1
650 TABLET ORAL EVERY 4 HOURS PRN
Status: DISCONTINUED | OUTPATIENT
Start: 2020-04-29 | End: 2020-05-02 | Stop reason: HOSPADM

## 2020-04-29 RX ADMIN — PROPOFOL 150 MG: 10 INJECTION, EMULSION INTRAVENOUS at 08:51

## 2020-04-29 RX ADMIN — HYDROMORPHONE HYDROCHLORIDE 0.2 MG: 1 INJECTION, SOLUTION INTRAMUSCULAR; INTRAVENOUS; SUBCUTANEOUS at 10:55

## 2020-04-29 RX ADMIN — METRONIDAZOLE 500 MG: 500 INJECTION, SOLUTION INTRAVENOUS at 08:45

## 2020-04-29 RX ADMIN — EPHEDRINE SULFATE 5 MG: 50 INJECTION, SOLUTION INTRAVENOUS at 09:09

## 2020-04-29 RX ADMIN — ALBUMIN (HUMAN): 12.5 SOLUTION INTRAVENOUS at 09:52

## 2020-04-29 RX ADMIN — SODIUM CHLORIDE, SODIUM LACTATE, POTASSIUM CHLORIDE, AND CALCIUM CHLORIDE: .6; .31; .03; .02 INJECTION, SOLUTION INTRAVENOUS at 11:02

## 2020-04-29 RX ADMIN — ONDANSETRON 4 MG: 2 INJECTION INTRAMUSCULAR; INTRAVENOUS at 10:29

## 2020-04-29 RX ADMIN — SODIUM CHLORIDE, SODIUM LACTATE, POTASSIUM CHLORIDE, AND CALCIUM CHLORIDE 100 ML/HR: .6; .31; .03; .02 INJECTION, SOLUTION INTRAVENOUS at 11:45

## 2020-04-29 RX ADMIN — LIDOCAINE HYDROCHLORIDE 50 MG: 10 INJECTION, SOLUTION EPIDURAL; INFILTRATION; INTRACAUDAL; PERINEURAL at 09:28

## 2020-04-29 RX ADMIN — SODIUM CHLORIDE, SODIUM LACTATE, POTASSIUM CHLORIDE, AND CALCIUM CHLORIDE: .6; .31; .03; .02 INJECTION, SOLUTION INTRAVENOUS at 08:00

## 2020-04-29 RX ADMIN — NEOSTIGMINE METHYLSULFATE 5 MG: 1 INJECTION, SOLUTION INTRAVENOUS at 10:41

## 2020-04-29 RX ADMIN — HYDROMORPHONE HYDROCHLORIDE 0.5 MG: 1 INJECTION, SOLUTION INTRAMUSCULAR; INTRAVENOUS; SUBCUTANEOUS at 21:55

## 2020-04-29 RX ADMIN — MIDAZOLAM 2 MG: 1 INJECTION INTRAMUSCULAR; INTRAVENOUS at 08:32

## 2020-04-29 RX ADMIN — HEPARIN SODIUM 5000 UNITS: 5000 INJECTION INTRAVENOUS; SUBCUTANEOUS at 14:55

## 2020-04-29 RX ADMIN — ALBUMIN (HUMAN): 12.5 SOLUTION INTRAVENOUS at 10:11

## 2020-04-29 RX ADMIN — BUPIVACAINE HYDROCHLORIDE 20 ML: 2.5 INJECTION, SOLUTION EPIDURAL; INFILTRATION; INTRACAUDAL at 10:48

## 2020-04-29 RX ADMIN — EPHEDRINE SULFATE 10 MG: 50 INJECTION, SOLUTION INTRAVENOUS at 09:59

## 2020-04-29 RX ADMIN — HYDROMORPHONE HYDROCHLORIDE 0.2 MG: 1 INJECTION, SOLUTION INTRAMUSCULAR; INTRAVENOUS; SUBCUTANEOUS at 10:40

## 2020-04-29 RX ADMIN — FENTANYL CITRATE 100 MCG: 50 INJECTION, SOLUTION INTRAMUSCULAR; INTRAVENOUS at 08:51

## 2020-04-29 RX ADMIN — EPHEDRINE SULFATE 10 MG: 50 INJECTION, SOLUTION INTRAVENOUS at 09:13

## 2020-04-29 RX ADMIN — EPHEDRINE SULFATE 10 MG: 50 INJECTION, SOLUTION INTRAVENOUS at 09:32

## 2020-04-29 RX ADMIN — Medication 200 MG: at 08:51

## 2020-04-29 RX ADMIN — HEPARIN SODIUM 5000 UNITS: 5000 INJECTION INTRAVENOUS; SUBCUTANEOUS at 21:56

## 2020-04-29 RX ADMIN — CEFAZOLIN SODIUM 2000 MG: 2 SOLUTION INTRAVENOUS at 08:35

## 2020-04-29 RX ADMIN — PHENYLEPHRINE HYDROCHLORIDE 100 MCG: 10 INJECTION INTRAVENOUS at 10:05

## 2020-04-29 RX ADMIN — SODIUM CHLORIDE, SODIUM LACTATE, POTASSIUM CHLORIDE, AND CALCIUM CHLORIDE 100 ML/HR: .6; .31; .03; .02 INJECTION, SOLUTION INTRAVENOUS at 21:57

## 2020-04-29 RX ADMIN — GLYCOPYRROLATE 1 MG: 0.2 INJECTION, SOLUTION INTRAMUSCULAR; INTRAVENOUS at 10:41

## 2020-04-29 RX ADMIN — DEXAMETHASONE SODIUM PHOSPHATE 10 MG: 10 INJECTION, SOLUTION INTRAMUSCULAR; INTRAVENOUS at 08:51

## 2020-04-29 RX ADMIN — HYDROMORPHONE HYDROCHLORIDE 0.5 MG: 1 INJECTION, SOLUTION INTRAMUSCULAR; INTRAVENOUS; SUBCUTANEOUS at 16:35

## 2020-04-29 RX ADMIN — HYDROMORPHONE HYDROCHLORIDE 0.2 MG: 1 INJECTION, SOLUTION INTRAMUSCULAR; INTRAVENOUS; SUBCUTANEOUS at 10:45

## 2020-04-29 RX ADMIN — HYDROMORPHONE HYDROCHLORIDE 0.2 MG: 1 INJECTION, SOLUTION INTRAMUSCULAR; INTRAVENOUS; SUBCUTANEOUS at 11:02

## 2020-04-29 RX ADMIN — SODIUM CHLORIDE: 0.9 INJECTION, SOLUTION INTRAVENOUS at 09:00

## 2020-04-29 RX ADMIN — HYDROMORPHONE HYDROCHLORIDE 0.2 MG: 1 INJECTION, SOLUTION INTRAMUSCULAR; INTRAVENOUS; SUBCUTANEOUS at 10:35

## 2020-04-29 RX ADMIN — ROCURONIUM BROMIDE 50 MG: 10 INJECTION, SOLUTION INTRAVENOUS at 09:26

## 2020-04-29 RX ADMIN — SODIUM CHLORIDE 500 ML: 0.9 INJECTION, SOLUTION INTRAVENOUS at 14:55

## 2020-04-30 LAB
ALBUMIN SERPL BCP-MCNC: 3.5 G/DL (ref 3.5–5)
ALP SERPL-CCNC: 51 U/L (ref 46–116)
ALT SERPL W P-5'-P-CCNC: 19 U/L (ref 12–78)
ANION GAP SERPL CALCULATED.3IONS-SCNC: 6 MMOL/L (ref 4–13)
AST SERPL W P-5'-P-CCNC: 14 U/L (ref 5–45)
BASOPHILS # BLD AUTO: 0.02 THOUSANDS/ΜL (ref 0–0.1)
BASOPHILS NFR BLD AUTO: 0 % (ref 0–1)
BILIRUB SERPL-MCNC: 0.32 MG/DL (ref 0.2–1)
BUN SERPL-MCNC: 12 MG/DL (ref 5–25)
CALCIUM SERPL-MCNC: 8.7 MG/DL (ref 8.3–10.1)
CHLORIDE SERPL-SCNC: 107 MMOL/L (ref 100–108)
CO2 SERPL-SCNC: 24 MMOL/L (ref 21–32)
CREAT SERPL-MCNC: 1.01 MG/DL (ref 0.6–1.3)
EOSINOPHIL # BLD AUTO: 0 THOUSAND/ΜL (ref 0–0.61)
EOSINOPHIL NFR BLD AUTO: 0 % (ref 0–6)
ERYTHROCYTE [DISTWIDTH] IN BLOOD BY AUTOMATED COUNT: 13.1 % (ref 11.6–15.1)
GFR SERPL CREATININE-BSD FRML MDRD: 57 ML/MIN/1.73SQ M
GLUCOSE SERPL-MCNC: 116 MG/DL (ref 65–140)
GLUCOSE SERPL-MCNC: 118 MG/DL (ref 65–140)
GLUCOSE SERPL-MCNC: 124 MG/DL (ref 65–140)
HCT VFR BLD AUTO: 33.2 % (ref 34.8–46.1)
HGB BLD-MCNC: 11.1 G/DL (ref 11.5–15.4)
IMM GRANULOCYTES # BLD AUTO: 0.05 THOUSAND/UL (ref 0–0.2)
IMM GRANULOCYTES NFR BLD AUTO: 0 % (ref 0–2)
LYMPHOCYTES # BLD AUTO: 0.92 THOUSANDS/ΜL (ref 0.6–4.47)
LYMPHOCYTES NFR BLD AUTO: 8 % (ref 14–44)
MAGNESIUM SERPL-MCNC: 2.1 MG/DL (ref 1.6–2.6)
MCH RBC QN AUTO: 29.9 PG (ref 26.8–34.3)
MCHC RBC AUTO-ENTMCNC: 33.4 G/DL (ref 31.4–37.4)
MCV RBC AUTO: 90 FL (ref 82–98)
MONOCYTES # BLD AUTO: 0.56 THOUSAND/ΜL (ref 0.17–1.22)
MONOCYTES NFR BLD AUTO: 5 % (ref 4–12)
NEUTROPHILS # BLD AUTO: 9.58 THOUSANDS/ΜL (ref 1.85–7.62)
NEUTS SEG NFR BLD AUTO: 87 % (ref 43–75)
NRBC BLD AUTO-RTO: 0 /100 WBCS
PHOSPHATE SERPL-MCNC: 3.7 MG/DL (ref 2.3–4.1)
PLATELET # BLD AUTO: 348 THOUSANDS/UL (ref 149–390)
PMV BLD AUTO: 9.9 FL (ref 8.9–12.7)
POTASSIUM SERPL-SCNC: 4.2 MMOL/L (ref 3.5–5.3)
PROT SERPL-MCNC: 6.6 G/DL (ref 6.4–8.2)
RBC # BLD AUTO: 3.71 MILLION/UL (ref 3.81–5.12)
SODIUM SERPL-SCNC: 137 MMOL/L (ref 136–145)
WBC # BLD AUTO: 11.13 THOUSAND/UL (ref 4.31–10.16)

## 2020-04-30 PROCEDURE — 80053 COMPREHEN METABOLIC PANEL: CPT | Performed by: INTERNAL MEDICINE

## 2020-04-30 PROCEDURE — 84100 ASSAY OF PHOSPHORUS: CPT | Performed by: INTERNAL MEDICINE

## 2020-04-30 PROCEDURE — 97163 PT EVAL HIGH COMPLEX 45 MIN: CPT

## 2020-04-30 PROCEDURE — 99232 SBSQ HOSP IP/OBS MODERATE 35: CPT | Performed by: INTERNAL MEDICINE

## 2020-04-30 PROCEDURE — 99024 POSTOP FOLLOW-UP VISIT: CPT | Performed by: SURGERY

## 2020-04-30 PROCEDURE — 97166 OT EVAL MOD COMPLEX 45 MIN: CPT

## 2020-04-30 PROCEDURE — 85025 COMPLETE CBC W/AUTO DIFF WBC: CPT | Performed by: SURGERY

## 2020-04-30 PROCEDURE — 82948 REAGENT STRIP/BLOOD GLUCOSE: CPT

## 2020-04-30 PROCEDURE — 83735 ASSAY OF MAGNESIUM: CPT | Performed by: SURGERY

## 2020-04-30 RX ORDER — AMLODIPINE BESYLATE 10 MG/1
10 TABLET ORAL DAILY
Status: DISCONTINUED | OUTPATIENT
Start: 2020-04-30 | End: 2020-04-30

## 2020-04-30 RX ORDER — DEXTROSE, SODIUM CHLORIDE, AND POTASSIUM CHLORIDE 5; .45; .15 G/100ML; G/100ML; G/100ML
50 INJECTION INTRAVENOUS CONTINUOUS
Status: DISCONTINUED | OUTPATIENT
Start: 2020-04-30 | End: 2020-04-30

## 2020-04-30 RX ORDER — FLUTICASONE PROPIONATE 50 MCG
2 SPRAY, SUSPENSION (ML) NASAL DAILY
Status: DISCONTINUED | OUTPATIENT
Start: 2020-04-30 | End: 2020-05-02 | Stop reason: HOSPADM

## 2020-04-30 RX ORDER — ATORVASTATIN CALCIUM 40 MG/1
40 TABLET, FILM COATED ORAL
Status: DISCONTINUED | OUTPATIENT
Start: 2020-04-30 | End: 2020-05-02 | Stop reason: HOSPADM

## 2020-04-30 RX ORDER — DEXTROSE AND SODIUM CHLORIDE 5; .9 G/100ML; G/100ML
50 INJECTION, SOLUTION INTRAVENOUS CONTINUOUS
Status: DISCONTINUED | OUTPATIENT
Start: 2020-04-30 | End: 2020-05-01

## 2020-04-30 RX ADMIN — ATORVASTATIN CALCIUM 40 MG: 40 TABLET, FILM COATED ORAL at 16:35

## 2020-04-30 RX ADMIN — HEPARIN SODIUM 5000 UNITS: 5000 INJECTION INTRAVENOUS; SUBCUTANEOUS at 22:24

## 2020-04-30 RX ADMIN — HEPARIN SODIUM 5000 UNITS: 5000 INJECTION INTRAVENOUS; SUBCUTANEOUS at 14:12

## 2020-04-30 RX ADMIN — DEXTROSE, SODIUM CHLORIDE, AND POTASSIUM CHLORIDE 50 ML/HR: 5; .45; .15 INJECTION INTRAVENOUS at 08:52

## 2020-04-30 RX ADMIN — DEXTROSE AND SODIUM CHLORIDE 50 ML/HR: 5; .9 INJECTION, SOLUTION INTRAVENOUS at 11:09

## 2020-04-30 RX ADMIN — HEPARIN SODIUM 5000 UNITS: 5000 INJECTION INTRAVENOUS; SUBCUTANEOUS at 05:24

## 2020-04-30 RX ADMIN — OXYCODONE HYDROCHLORIDE 5 MG: 5 TABLET ORAL at 08:56

## 2020-05-01 LAB
ANION GAP SERPL CALCULATED.3IONS-SCNC: 5 MMOL/L (ref 4–13)
BASOPHILS # BLD AUTO: 0.01 THOUSANDS/ΜL (ref 0–0.1)
BASOPHILS NFR BLD AUTO: 0 % (ref 0–1)
BUN SERPL-MCNC: 11 MG/DL (ref 5–25)
CALCIUM SERPL-MCNC: 8.7 MG/DL (ref 8.3–10.1)
CHLORIDE SERPL-SCNC: 108 MMOL/L (ref 100–108)
CO2 SERPL-SCNC: 26 MMOL/L (ref 21–32)
CREAT SERPL-MCNC: 0.97 MG/DL (ref 0.6–1.3)
EOSINOPHIL # BLD AUTO: 0.01 THOUSAND/ΜL (ref 0–0.61)
EOSINOPHIL NFR BLD AUTO: 0 % (ref 0–6)
ERYTHROCYTE [DISTWIDTH] IN BLOOD BY AUTOMATED COUNT: 13.4 % (ref 11.6–15.1)
GFR SERPL CREATININE-BSD FRML MDRD: 60 ML/MIN/1.73SQ M
GLUCOSE SERPL-MCNC: 109 MG/DL (ref 65–140)
HCT VFR BLD AUTO: 32.5 % (ref 34.8–46.1)
HGB BLD-MCNC: 10.8 G/DL (ref 11.5–15.4)
IMM GRANULOCYTES # BLD AUTO: 0.05 THOUSAND/UL (ref 0–0.2)
IMM GRANULOCYTES NFR BLD AUTO: 0 % (ref 0–2)
LYMPHOCYTES # BLD AUTO: 2.58 THOUSANDS/ΜL (ref 0.6–4.47)
LYMPHOCYTES NFR BLD AUTO: 22 % (ref 14–44)
MCH RBC QN AUTO: 29.8 PG (ref 26.8–34.3)
MCHC RBC AUTO-ENTMCNC: 33.2 G/DL (ref 31.4–37.4)
MCV RBC AUTO: 90 FL (ref 82–98)
MONOCYTES # BLD AUTO: 0.82 THOUSAND/ΜL (ref 0.17–1.22)
MONOCYTES NFR BLD AUTO: 7 % (ref 4–12)
NEUTROPHILS # BLD AUTO: 8.15 THOUSANDS/ΜL (ref 1.85–7.62)
NEUTS SEG NFR BLD AUTO: 71 % (ref 43–75)
NRBC BLD AUTO-RTO: 0 /100 WBCS
PHOSPHATE SERPL-MCNC: 3 MG/DL (ref 2.3–4.1)
PLATELET # BLD AUTO: 353 THOUSANDS/UL (ref 149–390)
PMV BLD AUTO: 9.9 FL (ref 8.9–12.7)
POTASSIUM SERPL-SCNC: 3.7 MMOL/L (ref 3.5–5.3)
RBC # BLD AUTO: 3.63 MILLION/UL (ref 3.81–5.12)
SODIUM SERPL-SCNC: 139 MMOL/L (ref 136–145)
WBC # BLD AUTO: 11.62 THOUSAND/UL (ref 4.31–10.16)

## 2020-05-01 PROCEDURE — 99232 SBSQ HOSP IP/OBS MODERATE 35: CPT | Performed by: INTERNAL MEDICINE

## 2020-05-01 PROCEDURE — 99024 POSTOP FOLLOW-UP VISIT: CPT | Performed by: SURGERY

## 2020-05-01 PROCEDURE — 84100 ASSAY OF PHOSPHORUS: CPT | Performed by: INTERNAL MEDICINE

## 2020-05-01 PROCEDURE — 80048 BASIC METABOLIC PNL TOTAL CA: CPT | Performed by: PHYSICIAN ASSISTANT

## 2020-05-01 PROCEDURE — NC001 PR NO CHARGE: Performed by: SURGERY

## 2020-05-01 PROCEDURE — 85025 COMPLETE CBC W/AUTO DIFF WBC: CPT | Performed by: PHYSICIAN ASSISTANT

## 2020-05-01 RX ORDER — HYDRALAZINE HYDROCHLORIDE 25 MG/1
50 TABLET, FILM COATED ORAL EVERY 8 HOURS SCHEDULED
Status: DISCONTINUED | OUTPATIENT
Start: 2020-05-01 | End: 2020-05-02 | Stop reason: HOSPADM

## 2020-05-01 RX ORDER — POTASSIUM CHLORIDE 20 MEQ/1
20 TABLET, EXTENDED RELEASE ORAL ONCE
Status: COMPLETED | OUTPATIENT
Start: 2020-05-01 | End: 2020-05-01

## 2020-05-01 RX ORDER — METOPROLOL TARTRATE 50 MG/1
50 TABLET, FILM COATED ORAL
Status: DISCONTINUED | OUTPATIENT
Start: 2020-05-01 | End: 2020-05-02 | Stop reason: HOSPADM

## 2020-05-01 RX ORDER — METOPROLOL TARTRATE 50 MG/1
100 TABLET, FILM COATED ORAL EVERY 12 HOURS SCHEDULED
Status: DISCONTINUED | OUTPATIENT
Start: 2020-05-01 | End: 2020-05-02 | Stop reason: HOSPADM

## 2020-05-01 RX ORDER — AMLODIPINE BESYLATE 10 MG/1
10 TABLET ORAL DAILY
Status: DISCONTINUED | OUTPATIENT
Start: 2020-05-01 | End: 2020-05-02 | Stop reason: HOSPADM

## 2020-05-01 RX ORDER — OXYCODONE HYDROCHLORIDE 5 MG/1
5 TABLET ORAL EVERY 6 HOURS PRN
Qty: 10 TABLET | Refills: 0 | Status: SHIPPED | OUTPATIENT
Start: 2020-05-01 | End: 2020-05-06

## 2020-05-01 RX ADMIN — HYDRALAZINE HYDROCHLORIDE 50 MG: 25 TABLET ORAL at 22:45

## 2020-05-01 RX ADMIN — HEPARIN SODIUM 5000 UNITS: 5000 INJECTION INTRAVENOUS; SUBCUTANEOUS at 14:05

## 2020-05-01 RX ADMIN — HEPARIN SODIUM 5000 UNITS: 5000 INJECTION INTRAVENOUS; SUBCUTANEOUS at 05:35

## 2020-05-01 RX ADMIN — METOPROLOL TARTRATE 50 MG: 50 TABLET, FILM COATED ORAL at 12:36

## 2020-05-01 RX ADMIN — HYDRALAZINE HYDROCHLORIDE 50 MG: 25 TABLET ORAL at 14:07

## 2020-05-01 RX ADMIN — HYDRALAZINE HYDROCHLORIDE 50 MG: 25 TABLET ORAL at 08:39

## 2020-05-01 RX ADMIN — POTASSIUM CHLORIDE 20 MEQ: 1500 TABLET, EXTENDED RELEASE ORAL at 08:37

## 2020-05-01 RX ADMIN — METOPROLOL TARTRATE 100 MG: 50 TABLET, FILM COATED ORAL at 22:45

## 2020-05-01 RX ADMIN — FLUTICASONE PROPIONATE 2 SPRAY: 50 SPRAY, METERED NASAL at 08:37

## 2020-05-01 RX ADMIN — AMLODIPINE BESYLATE 10 MG: 10 TABLET ORAL at 14:07

## 2020-05-01 RX ADMIN — HEPARIN SODIUM 5000 UNITS: 5000 INJECTION INTRAVENOUS; SUBCUTANEOUS at 22:45

## 2020-05-01 RX ADMIN — METOPROLOL TARTRATE 100 MG: 50 TABLET, FILM COATED ORAL at 08:37

## 2020-05-01 RX ADMIN — DEXTROSE AND SODIUM CHLORIDE 50 ML/HR: 5; .9 INJECTION, SOLUTION INTRAVENOUS at 05:37

## 2020-05-02 VITALS
DIASTOLIC BLOOD PRESSURE: 76 MMHG | OXYGEN SATURATION: 97 % | HEIGHT: 68 IN | BODY MASS INDEX: 33.08 KG/M2 | HEART RATE: 75 BPM | SYSTOLIC BLOOD PRESSURE: 141 MMHG | TEMPERATURE: 97.4 F | RESPIRATION RATE: 18 BRPM | WEIGHT: 218.26 LBS

## 2020-05-02 LAB
ANION GAP SERPL CALCULATED.3IONS-SCNC: 7 MMOL/L (ref 4–13)
BUN SERPL-MCNC: 13 MG/DL (ref 5–25)
CALCIUM SERPL-MCNC: 8.7 MG/DL (ref 8.3–10.1)
CHLORIDE SERPL-SCNC: 106 MMOL/L (ref 100–108)
CO2 SERPL-SCNC: 25 MMOL/L (ref 21–32)
CREAT SERPL-MCNC: 0.92 MG/DL (ref 0.6–1.3)
ERYTHROCYTE [DISTWIDTH] IN BLOOD BY AUTOMATED COUNT: 13.2 % (ref 11.6–15.1)
GFR SERPL CREATININE-BSD FRML MDRD: 64 ML/MIN/1.73SQ M
GLUCOSE SERPL-MCNC: 92 MG/DL (ref 65–140)
HCT VFR BLD AUTO: 33.9 % (ref 34.8–46.1)
HGB BLD-MCNC: 11.2 G/DL (ref 11.5–15.4)
MCH RBC QN AUTO: 29.2 PG (ref 26.8–34.3)
MCHC RBC AUTO-ENTMCNC: 33 G/DL (ref 31.4–37.4)
MCV RBC AUTO: 89 FL (ref 82–98)
PLATELET # BLD AUTO: 357 THOUSANDS/UL (ref 149–390)
PMV BLD AUTO: 9.4 FL (ref 8.9–12.7)
POTASSIUM SERPL-SCNC: 3.7 MMOL/L (ref 3.5–5.3)
RBC # BLD AUTO: 3.83 MILLION/UL (ref 3.81–5.12)
SODIUM SERPL-SCNC: 138 MMOL/L (ref 136–145)
WBC # BLD AUTO: 9.21 THOUSAND/UL (ref 4.31–10.16)

## 2020-05-02 PROCEDURE — 85027 COMPLETE CBC AUTOMATED: CPT | Performed by: PHYSICIAN ASSISTANT

## 2020-05-02 PROCEDURE — NC001 PR NO CHARGE: Performed by: SURGERY

## 2020-05-02 PROCEDURE — 99024 POSTOP FOLLOW-UP VISIT: CPT | Performed by: SURGERY

## 2020-05-02 PROCEDURE — 80048 BASIC METABOLIC PNL TOTAL CA: CPT | Performed by: PHYSICIAN ASSISTANT

## 2020-05-02 RX ORDER — POTASSIUM CHLORIDE 20 MEQ/1
40 TABLET, EXTENDED RELEASE ORAL ONCE
Status: COMPLETED | OUTPATIENT
Start: 2020-05-02 | End: 2020-05-02

## 2020-05-02 RX ORDER — LISINOPRIL 20 MG/1
20 TABLET ORAL 2 TIMES DAILY
Qty: 30 TABLET | Refills: 0 | Status: SHIPPED | OUTPATIENT
Start: 2020-05-03 | End: 2020-06-02

## 2020-05-02 RX ADMIN — METOPROLOL TARTRATE 50 MG: 50 TABLET, FILM COATED ORAL at 09:10

## 2020-05-02 RX ADMIN — HYDRALAZINE HYDROCHLORIDE 50 MG: 25 TABLET ORAL at 05:09

## 2020-05-02 RX ADMIN — FLUTICASONE PROPIONATE 2 SPRAY: 50 SPRAY, METERED NASAL at 09:12

## 2020-05-02 RX ADMIN — AMLODIPINE BESYLATE 10 MG: 10 TABLET ORAL at 09:09

## 2020-05-02 RX ADMIN — POTASSIUM CHLORIDE 40 MEQ: 1500 TABLET, EXTENDED RELEASE ORAL at 09:09

## 2020-05-02 RX ADMIN — HEPARIN SODIUM 5000 UNITS: 5000 INJECTION INTRAVENOUS; SUBCUTANEOUS at 05:11

## 2020-05-12 ENCOUNTER — OFFICE VISIT (OUTPATIENT)
Dept: NEPHROLOGY | Facility: CLINIC | Age: 70
End: 2020-05-12
Payer: COMMERCIAL

## 2020-05-12 ENCOUNTER — TELEPHONE (OUTPATIENT)
Dept: SURGICAL ONCOLOGY | Facility: CLINIC | Age: 70
End: 2020-05-12

## 2020-05-12 VITALS
SYSTOLIC BLOOD PRESSURE: 134 MMHG | OXYGEN SATURATION: 98 % | WEIGHT: 212.4 LBS | TEMPERATURE: 98.4 F | BODY MASS INDEX: 32.19 KG/M2 | HEIGHT: 68 IN | DIASTOLIC BLOOD PRESSURE: 76 MMHG | HEART RATE: 67 BPM

## 2020-05-12 DIAGNOSIS — E78.00 PURE HYPERCHOLESTEROLEMIA: ICD-10-CM

## 2020-05-12 DIAGNOSIS — C7A.019 MALIGNANT CARCINOID TUMOR OF SMALL INTESTINE, UNSPECIFIED LOCATION (HCC): ICD-10-CM

## 2020-05-12 DIAGNOSIS — N18.2 STAGE 2 CHRONIC KIDNEY DISEASE: ICD-10-CM

## 2020-05-12 DIAGNOSIS — N17.9 ACUTE RENAL FAILURE, UNSPECIFIED ACUTE RENAL FAILURE TYPE (HCC): Primary | ICD-10-CM

## 2020-05-12 DIAGNOSIS — E27.8 ADRENAL NODULE (HCC): ICD-10-CM

## 2020-05-12 DIAGNOSIS — C7B.09 CARCINOID TUMOR METASTATIC TO INTRA-ABDOMINAL LYMPH NODE (HCC): ICD-10-CM

## 2020-05-12 DIAGNOSIS — C7A.00 CARCINOID TUMOR METASTATIC TO INTRA-ABDOMINAL LYMPH NODE (HCC): ICD-10-CM

## 2020-05-12 PROBLEM — C7A.8 PRIMARY MALIGNANT NEUROENDOCRINE TUMOR OF SMALL INTESTINE (HCC): Status: ACTIVE | Noted: 2020-04-09

## 2020-05-12 PROCEDURE — 99214 OFFICE O/P EST MOD 30 MIN: CPT | Performed by: INTERNAL MEDICINE

## 2020-05-14 ENCOUNTER — TELEPHONE (OUTPATIENT)
Dept: SURGERY | Facility: CLINIC | Age: 70
End: 2020-05-14

## 2020-05-14 ENCOUNTER — OFFICE VISIT (OUTPATIENT)
Dept: SURGICAL ONCOLOGY | Facility: CLINIC | Age: 70
End: 2020-05-14

## 2020-05-14 VITALS
DIASTOLIC BLOOD PRESSURE: 88 MMHG | HEART RATE: 61 BPM | BODY MASS INDEX: 31.99 KG/M2 | SYSTOLIC BLOOD PRESSURE: 130 MMHG | TEMPERATURE: 98.1 F | WEIGHT: 211.1 LBS | HEIGHT: 68 IN

## 2020-05-14 DIAGNOSIS — C7A.8 PRIMARY MALIGNANT NEUROENDOCRINE TUMOR OF SMALL INTESTINE (HCC): Primary | ICD-10-CM

## 2020-05-14 PROCEDURE — 99024 POSTOP FOLLOW-UP VISIT: CPT | Performed by: SURGERY

## 2020-05-18 ENCOUNTER — TELEPHONE (OUTPATIENT)
Dept: NEPHROLOGY | Facility: CLINIC | Age: 70
End: 2020-05-18

## 2020-05-18 DIAGNOSIS — Z12.39 ENCOUNTER FOR BREAST CANCER SCREENING OTHER THAN MAMMOGRAM: Primary | ICD-10-CM

## 2020-05-26 ENCOUNTER — TELEPHONE (OUTPATIENT)
Dept: NEPHROLOGY | Facility: CLINIC | Age: 70
End: 2020-05-26

## 2020-05-26 ENCOUNTER — HOSPITAL ENCOUNTER (OUTPATIENT)
Dept: MAMMOGRAPHY | Facility: HOSPITAL | Age: 70
Discharge: HOME | End: 2020-05-26
Attending: INTERNAL MEDICINE
Payer: COMMERCIAL

## 2020-05-26 VITALS — HEIGHT: 68 IN | WEIGHT: 220 LBS | BODY MASS INDEX: 33.34 KG/M2

## 2020-05-26 DIAGNOSIS — Z12.39 ENCOUNTER FOR BREAST CANCER SCREENING OTHER THAN MAMMOGRAM: ICD-10-CM

## 2020-05-26 PROCEDURE — 77063 BREAST TOMOSYNTHESIS BI: CPT

## 2020-05-26 PROCEDURE — 77067 SCR MAMMO BI INCL CAD: CPT

## 2020-06-01 ENCOUNTER — CONSULT (OUTPATIENT)
Dept: HEMATOLOGY ONCOLOGY | Facility: CLINIC | Age: 70
End: 2020-06-01
Payer: COMMERCIAL

## 2020-06-01 VITALS
HEIGHT: 68 IN | OXYGEN SATURATION: 98 % | WEIGHT: 214 LBS | SYSTOLIC BLOOD PRESSURE: 146 MMHG | TEMPERATURE: 98.5 F | BODY MASS INDEX: 32.43 KG/M2 | HEART RATE: 62 BPM | DIASTOLIC BLOOD PRESSURE: 82 MMHG | RESPIRATION RATE: 18 BRPM

## 2020-06-01 DIAGNOSIS — E78.00 PURE HYPERCHOLESTEROLEMIA: ICD-10-CM

## 2020-06-01 DIAGNOSIS — I10 ESSENTIAL HYPERTENSION: Primary | ICD-10-CM

## 2020-06-01 DIAGNOSIS — C7A.8 PRIMARY MALIGNANT NEUROENDOCRINE TUMOR OF SMALL INTESTINE (HCC): ICD-10-CM

## 2020-06-01 PROCEDURE — 99204 OFFICE O/P NEW MOD 45 MIN: CPT | Performed by: INTERNAL MEDICINE

## 2020-06-02 ENCOUNTER — DOCUMENTATION (OUTPATIENT)
Dept: INFUSION CENTER | Facility: HOSPITAL | Age: 70
End: 2020-06-02

## 2020-06-02 DIAGNOSIS — I10 ESSENTIAL HYPERTENSION: ICD-10-CM

## 2020-06-02 RX ORDER — LISINOPRIL 20 MG/1
TABLET ORAL
Qty: 180 TABLET | Refills: 1 | Status: SHIPPED | OUTPATIENT
Start: 2020-06-02 | End: 2020-06-22 | Stop reason: SDUPTHER

## 2020-06-04 ENCOUNTER — DOCUMENTATION (OUTPATIENT)
Dept: HEMATOLOGY ONCOLOGY | Facility: CLINIC | Age: 70
End: 2020-06-04

## 2020-06-12 ENCOUNTER — HOSPITAL ENCOUNTER (OUTPATIENT)
Dept: MAMMOGRAPHY | Facility: HOSPITAL | Age: 70
Discharge: HOME | End: 2020-06-12
Attending: INTERNAL MEDICINE
Payer: COMMERCIAL

## 2020-06-12 DIAGNOSIS — R92.8 ABNORMAL MAMMOGRAM: ICD-10-CM

## 2020-06-12 PROCEDURE — 77066 DX MAMMO INCL CAD BI: CPT

## 2020-06-18 ENCOUNTER — HOSPITAL ENCOUNTER (OUTPATIENT)
Dept: MAMMOGRAPHY | Facility: CLINIC | Age: 70
Discharge: HOME | End: 2020-06-18

## 2020-06-18 ENCOUNTER — HOSPITAL ENCOUNTER (OUTPATIENT)
Dept: MAMMOGRAPHY | Facility: CLINIC | Age: 70
Discharge: HOME | End: 2020-06-18
Admitting: RADIOLOGY
Payer: COMMERCIAL

## 2020-06-18 VITALS — SYSTOLIC BLOOD PRESSURE: 198 MMHG | TEMPERATURE: 98.6 F | HEART RATE: 73 BPM | DIASTOLIC BLOOD PRESSURE: 104 MMHG

## 2020-06-18 DIAGNOSIS — R92.8 ABNORMAL MAMMOGRAM: ICD-10-CM

## 2020-06-18 PROCEDURE — 88342 IMHCHEM/IMCYTCHM 1ST ANTB: CPT | Performed by: PATHOLOGY

## 2020-06-18 PROCEDURE — 19082 BX BREAST ADD LESION STRTCTC: CPT

## 2020-06-18 PROCEDURE — 88305 TISSUE EXAM BY PATHOLOGIST: CPT | Performed by: PATHOLOGY

## 2020-06-18 PROCEDURE — 19081 BX BREAST 1ST LESION STRTCTC: CPT

## 2020-06-18 RX ORDER — LIDOCAINE HYDROCHLORIDE 10 MG/ML
5 INJECTION, SOLUTION EPIDURAL; INFILTRATION; INTRACAUDAL; PERINEURAL ONCE
Status: COMPLETED | OUTPATIENT
Start: 2020-06-18 | End: 2020-06-18

## 2020-06-18 RX ORDER — LIDOCAINE HYDROCHLORIDE AND EPINEPHRINE 20; 5 MG/ML; UG/ML
10 INJECTION, SOLUTION EPIDURAL; INFILTRATION; INTRACAUDAL; PERINEURAL ONCE
Status: COMPLETED | OUTPATIENT
Start: 2020-06-18 | End: 2020-06-18

## 2020-06-18 RX ADMIN — LIDOCAINE HYDROCHLORIDE AND EPINEPHRINE 10 ML: 20; 5 INJECTION, SOLUTION EPIDURAL; INFILTRATION; INTRACAUDAL; PERINEURAL at 11:46

## 2020-06-18 RX ADMIN — LIDOCAINE HYDROCHLORIDE AND EPINEPHRINE 10 ML: 20; 5 INJECTION, SOLUTION EPIDURAL; INFILTRATION; INTRACAUDAL; PERINEURAL at 11:08

## 2020-06-18 RX ADMIN — LIDOCAINE HYDROCHLORIDE 5 ML: 10 INJECTION, SOLUTION EPIDURAL; INFILTRATION; INTRACAUDAL; PERINEURAL at 12:07

## 2020-06-18 RX ADMIN — LIDOCAINE HYDROCHLORIDE 5 ML: 10 INJECTION, SOLUTION EPIDURAL; INFILTRATION; INTRACAUDAL; PERINEURAL at 11:08

## 2020-06-18 RX ADMIN — LIDOCAINE HYDROCHLORIDE 5 ML: 10 INJECTION, SOLUTION EPIDURAL; INFILTRATION; INTRACAUDAL; PERINEURAL at 11:46

## 2020-06-22 ENCOUNTER — TELEPHONE (OUTPATIENT)
Dept: MAMMOGRAPHY | Facility: CLINIC | Age: 70
End: 2020-06-22

## 2020-06-22 DIAGNOSIS — I10 ESSENTIAL HYPERTENSION: ICD-10-CM

## 2020-06-22 DIAGNOSIS — E78.00 PURE HYPERCHOLESTEROLEMIA: Primary | ICD-10-CM

## 2020-06-22 RX ORDER — METOPROLOL TARTRATE 100 MG/1
TABLET ORAL
Qty: 240 TABLET | Refills: 3 | Status: SHIPPED | OUTPATIENT
Start: 2020-06-22 | End: 2020-08-25

## 2020-06-22 RX ORDER — HYDRALAZINE HYDROCHLORIDE 50 MG/1
50 TABLET, FILM COATED ORAL 3 TIMES DAILY
Qty: 90 TABLET | Refills: 3 | Status: SHIPPED | OUTPATIENT
Start: 2020-06-22 | End: 2020-07-13 | Stop reason: SDUPTHER

## 2020-06-22 RX ORDER — ROSUVASTATIN CALCIUM 20 MG/1
20 TABLET, COATED ORAL
Qty: 90 TABLET | Refills: 3 | Status: SHIPPED | OUTPATIENT
Start: 2020-06-22 | End: 2021-06-28 | Stop reason: SDUPTHER

## 2020-06-22 RX ORDER — LISINOPRIL 20 MG/1
20 TABLET ORAL 2 TIMES DAILY
Qty: 180 TABLET | Refills: 3 | Status: SHIPPED | OUTPATIENT
Start: 2020-06-22 | End: 2021-05-04 | Stop reason: SDUPTHER

## 2020-07-13 DIAGNOSIS — I10 ESSENTIAL HYPERTENSION: ICD-10-CM

## 2020-07-13 RX ORDER — HYDRALAZINE HYDROCHLORIDE 50 MG/1
50 TABLET, FILM COATED ORAL 3 TIMES DAILY
Qty: 90 TABLET | Refills: 3 | Status: SHIPPED | OUTPATIENT
Start: 2020-07-13 | End: 2020-11-06 | Stop reason: SDUPTHER

## 2020-08-25 DIAGNOSIS — I10 ESSENTIAL HYPERTENSION: ICD-10-CM

## 2020-08-25 RX ORDER — METOPROLOL TARTRATE 100 MG/1
TABLET ORAL
Qty: 180 TABLET | Refills: 0 | Status: SHIPPED | OUTPATIENT
Start: 2020-08-25 | End: 2020-08-28 | Stop reason: SDUPTHER

## 2020-08-28 DIAGNOSIS — I10 ESSENTIAL HYPERTENSION: ICD-10-CM

## 2020-08-28 RX ORDER — METOPROLOL TARTRATE 100 MG/1
TABLET ORAL
Qty: 180 TABLET | Refills: 3 | Status: SHIPPED | OUTPATIENT
Start: 2020-08-28 | End: 2020-11-06 | Stop reason: SDUPTHER

## 2020-08-31 ENCOUNTER — OFFICE VISIT (OUTPATIENT)
Dept: NEPHROLOGY | Facility: CLINIC | Age: 70
End: 2020-08-31
Payer: COMMERCIAL

## 2020-08-31 VITALS
WEIGHT: 209 LBS | HEIGHT: 68 IN | SYSTOLIC BLOOD PRESSURE: 164 MMHG | DIASTOLIC BLOOD PRESSURE: 100 MMHG | TEMPERATURE: 97.4 F | OXYGEN SATURATION: 98 % | HEART RATE: 71 BPM | BODY MASS INDEX: 31.67 KG/M2

## 2020-08-31 DIAGNOSIS — I16.0 HYPERTENSIVE URGENCY: ICD-10-CM

## 2020-08-31 DIAGNOSIS — E78.00 PURE HYPERCHOLESTEROLEMIA: ICD-10-CM

## 2020-08-31 DIAGNOSIS — I10 ESSENTIAL HYPERTENSION: Primary | ICD-10-CM

## 2020-08-31 DIAGNOSIS — C7A.8 PRIMARY MALIGNANT NEUROENDOCRINE TUMOR OF SMALL INTESTINE (HCC): ICD-10-CM

## 2020-08-31 PROCEDURE — 99214 OFFICE O/P EST MOD 30 MIN: CPT | Performed by: INTERNAL MEDICINE

## 2020-08-31 NOTE — PROGRESS NOTES
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  A CT demonstrated a small bowel mass and she had a small bowel resection  She will have a cT of the abdomen and no therapy was advised  She will return to Dr Heidi Lloyd in November 2020  The following portions of the patient's history were reviewed and updated as appropriate: allergies, current medications, past family history, past medical history, past social history, past surgical history and problem list     Review of Systems   Constitutional: Positive for diaphoresis and fatigue  Negative for activity change, chills and fever  HENT: Negative for hearing loss  Eyes: Negative for visual disturbance  Developing cataracts   Respiratory: Negative for cough, chest tightness and shortness of breath  Cardiovascular: Negative for chest pain, palpitations and leg swelling  Gastrointestinal: Negative for abdominal pain, blood in stool, constipation and diarrhea  Genitourinary: Negative for decreased urine volume, difficulty urinating, dysuria, hematuria and urgency  Musculoskeletal: Positive for arthralgias  Negative for back pain  Neurological: Negative for dizziness, weakness and light-headedness  Hematological: Does not bruise/bleed easily  Psychiatric/Behavioral: Positive for sleep disturbance  Negative for dysphoric mood           Social History     Socioeconomic History    Marital status: /Civil Union     Spouse name: None    Number of children: None    Years of education: None    Highest education level: None   Occupational History    None   Social Needs    Financial resource strain: None    Food insecurity     Worry: None     Inability: None    Transportation needs     Medical: None     Non-medical: None   Tobacco Use    Smoking status: Former Smoker     Last attempt to quit: 1985     Years since quittin 6    Smokeless tobacco: Never Used    Tobacco comment: 30  YEARS AGO    Substance and Sexual Activity    Alcohol use: Yes     Frequency: 2-4 times a month     Drinks per session: 1 or 2     Binge frequency: Never    Drug use: Never    Sexual activity: None   Lifestyle    Physical activity     Days per week: None     Minutes per session: None    Stress: None   Relationships    Social connections     Talks on phone: None     Gets together: None     Attends Gnosticism service: None     Active member of club or organization: None     Attends meetings of clubs or organizations: None     Relationship status: None    Intimate partner violence     Fear of current or ex partner: None     Emotionally abused: None     Physically abused: None     Forced sexual activity: None   Other Topics Concern    None   Social History Narrative    None     Past Medical History:   Diagnosis Date    Abnormal weight gain     Anxiety     Benign essential hypertension     Chronic kidney disease, stage 3 ([facility] [address] )     Colon polyps     Essential hypertension     Hematuria syndrome     Hyperlipidemia     Hypertension     Morbid obesity ([facility] [address] )     Osteoarthritis     Proteinuria     Vitamin D deficiency      Past Surgical History:   Procedure Laterality Date    BREAST BIOPSY Right 2017    benign    COLONOSCOPY  2018    3 polyps     COLONOSCOPY      several    MAMMO STEREOTACTIC BREAST BIOPSY LEFT (ALL INC) Left 2020    MAMMO STEREOTACTIC BREAST BIOPSY RIGHT (ALL INC) Right 6/18/2020    KS LAP,DIAGNOSTIC ABDOMEN N/A 3/24/2020    Procedure: 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;  Surgeon: Bruce Bermeo MD;  Location: [address] MAIN OR;  Service: General    RECTAL BIOPSY N/A 2/9/2018    Procedure: TRANSANAL EXCISION RECTAL POLYP;MUCOSAL POLYPECTOMY;  Surgeon: Haylie Velasquez MD;  Location: [facility] MAIN OR;  Service: Colorectal    SMALL INTESTINE SURGERY N/A 4/29/2020    Procedure: RESECTION SMALL BOWEL;  Surgeon: Anuj Abarca MD;  Location: [facility] MAIN OR;  Service: Surgical Oncology       Current Outpatient Medications:     Cholecalciferol (VITAMIN D3) 50 MCG (2000 UT) capsule, Take 2,000 Units by mouth every morning , Disp: , Rfl:     co-enzyme Q-10 50 MG capsule, Take 50 mg by mouth every other day, Disp: , Rfl:     fluticasone (FLONASE) 50 mcg/act nasal spray, 2 sprays into each nostril daily (Patient taking differently: 2 sprays into each nostril as needed ), Disp: 1 Bottle, Rfl: 2    hydrALAZINE (APRESOLINE) 50 mg tablet, Take 1 tablet (50 mg total) by mouth 3 (three) times a day, Disp: 90 tablet, Rfl: 3    lisinopril (ZESTRIL) 20 mg tablet, Take 1 tablet (20 mg total) by mouth 2 (two) times a day, Disp: 180 tablet, Rfl: 3    metoprolol tartrate (LOPRESSOR) 100 mg tablet, Take 1 tablet by mouth twice daily, Disp: 180 tablet, Rfl: 3    rosuvastatin (CRESTOR) 20 MG tablet, Take 1 tablet (20 mg total) by mouth every other day, Disp: 90 tablet, Rfl: 3    Lab Results   Component Value Date     (L) 05/30/2018    SODIUM 138 05/02/2020    K 3 7 05/02/2020     05/02/2020    CO2 25 05/02/2020    ANIONGAP 13 1 05/30/2018    AGAP 7 05/02/2020    BUN 13 05/02/2020    CREATININE 0 92 05/02/2020    GLUC 92 05/02/2020    GLUF 100 (H) 02/25/2020    CALCIUM 8 7 05/02/2020    AST 14 04/30/2020    ALT 19 04/30/2020    ALKPHOS 51 04/30/2020    PROT 7 8 05/04/2018    TP 6 6 04/30/2020    BILITOT 0 7 05/04/2018    TBILI 0 32 04/30/2020    EGFR 64 05/02/2020     Lab Results   Component Value Date    WBC 9 21 05/02/2020    HGB 11 2 (L) 05/02/2020    HCT 33 9 (L) 05/02/2020    MCV 89 05/02/2020     05/02/2020     Lab Results   Component Value Date    CHOLESTEROL 131 02/25/2020    CHOLESTEROL 150 05/16/2019     Lab Results   Component Value Date    HDL 45 02/25/2020    HDL 47 05/16/2019    HDL 41 05/04/2018     Lab Results   Component Value Date    LDLCALC 56 02/25/2020    LDLCALC 63 05/16/2019    LDLCALC 189 6 05/04/2018     Lab Results   Component Value Date    TRIG 151 02/25/2020    TRIG 198 (H) 05/16/2019    TRIG 221 (H) 05/04/2018     No results found for: CHOLHDL  Lab Results   Component Value Date    MCN5PAUCZRHK 3 950 02/25/2020     Lab Results   Component Value Date    CALCIUM 8 7 05/02/2020    PHOS 3 0 05/01/2020     No results found for: SPEP, UPEP  No results found for: Davonte Tanmay    Cr rey from 1 11 -> 0 97 with an increase in eGFR to 60 ml/min  She was not taking lisinopril at that time    Objective:      /100 (BP Location: Left arm, Patient Position: Sitting, Cuff Size: Standard)   Pulse 71   Temp (!) 97 4 °F (36 3 °C) (Temporal)   Ht 5' 8" (1 727 m)   Wt 94 8 kg (209 lb)   SpO2 98%   BMI 31 78 kg/m²   180/100 end exam with 120-130/70 at home with a comparable cuff       Physical Exam  Constitutional:       General: She is not in acute distress  Appearance: Normal appearance  She is normal weight  She is not ill-appearing  HENT:      Head: Normocephalic and atraumatic  Right Ear: External ear normal       Left Ear: External ear normal    Eyes:      Pupils: Pupils are equal, round, and reactive to light  Neck:      Musculoskeletal: Normal range of motion  Cardiovascular:      Rate and Rhythm: Normal rate and regular rhythm  Pulmonary:      Effort: Pulmonary effort is normal  No respiratory distress  Breath sounds: Normal breath sounds  No wheezing or rales     Abdominal:      General: Bowel sounds are normal  There is no distension  Palpations: Abdomen is soft  Tenderness: There is no abdominal tenderness  Musculoskeletal: Normal range of motion  General: No tenderness  Skin:     General: Skin is warm and dry  Neurological:      General: No focal deficit present  Mental Status: She is alert  Psychiatric:         Mood and Affect: Mood normal          Behavior: Behavior normal          Thought Content:  Thought content normal          Judgment: Judgment normal

## 2020-08-31 NOTE — ASSESSMENT & PLAN NOTE
She saw Dr Jelly Barreto for evaluation and he obtained a 2nd opinion with Dr Phil Petersen    Both agreed that periodic follow-up was necessary but no chemotherapy was given  Watchful waiting was advised  She will have a CT in November and ChromograninA test

## 2020-08-31 NOTE — ASSESSMENT & PLAN NOTE
She has stage IIIA chronic kidney disease was with an EGFR 57 mils per minute  She did have an improvement off lisinopril    However would continue lisinopril even though she may lose 3% in GFR to protect her kidneys for the long-term Composite Graft Text: The defect edges were debeveled with a #15 scalpel blade.  Given the location of the defect, shape of the defect, the proximity to free margins and the fact the defect was full thickness a composite graft was deemed most appropriate.  The defect was outline and then transferred to the donor site.  A full thickness graft was then excised from the donor site. The graft was then placed in the primary defect, oriented appropriately and then sutured into place.  The secondary defect was then repaired using a primary closure.

## 2020-09-17 ENCOUNTER — APPOINTMENT (OUTPATIENT)
Dept: LAB | Facility: HOSPITAL | Age: 70
End: 2020-09-17
Payer: COMMERCIAL

## 2020-09-17 DIAGNOSIS — C7A.8 PRIMARY MALIGNANT NEUROENDOCRINE TUMOR OF SMALL INTESTINE (HCC): ICD-10-CM

## 2020-09-17 DIAGNOSIS — E78.00 PURE HYPERCHOLESTEROLEMIA: ICD-10-CM

## 2020-09-17 DIAGNOSIS — I10 ESSENTIAL HYPERTENSION: ICD-10-CM

## 2020-09-17 LAB
ALBUMIN SERPL BCP-MCNC: 4.5 G/DL (ref 3.5–5.7)
ALP SERPL-CCNC: 46 U/L (ref 55–165)
ALT SERPL W P-5'-P-CCNC: 12 U/L (ref 7–52)
ANION GAP SERPL CALCULATED.3IONS-SCNC: 8 MMOL/L (ref 4–13)
AST SERPL W P-5'-P-CCNC: 15 U/L (ref 13–39)
BACTERIA UR QL AUTO: ABNORMAL /HPF
BASOPHILS # BLD AUTO: 0 THOUSANDS/ΜL (ref 0–0.1)
BASOPHILS NFR BLD AUTO: 1 % (ref 0–2)
BILIRUB SERPL-MCNC: 0.7 MG/DL (ref 0.2–1)
BILIRUB UR QL STRIP: NEGATIVE
BUN SERPL-MCNC: 13 MG/DL (ref 7–25)
CALCIUM SERPL-MCNC: 9.2 MG/DL (ref 8.6–10.5)
CHLORIDE SERPL-SCNC: 101 MMOL/L (ref 98–107)
CLARITY UR: CLEAR
CO2 SERPL-SCNC: 28 MMOL/L (ref 21–31)
COLOR UR: YELLOW
CREAT SERPL-MCNC: 1.11 MG/DL (ref 0.6–1.2)
CREAT UR-MCNC: 67.1 MG/DL
CREAT UR-MCNC: 67.1 MG/DL
EOSINOPHIL # BLD AUTO: 0.1 THOUSAND/ΜL (ref 0–0.61)
EOSINOPHIL NFR BLD AUTO: 1 % (ref 0–5)
ERYTHROCYTE [DISTWIDTH] IN BLOOD BY AUTOMATED COUNT: 13.5 % (ref 11.5–14.5)
GFR SERPL CREATININE-BSD FRML MDRD: 51 ML/MIN/1.73SQ M
GLUCOSE P FAST SERPL-MCNC: 100 MG/DL (ref 65–99)
GLUCOSE UR STRIP-MCNC: NEGATIVE MG/DL
HCT VFR BLD AUTO: 38.6 % (ref 42–47)
HGB BLD-MCNC: 13.3 G/DL (ref 12–16)
HGB UR QL STRIP.AUTO: NEGATIVE
KETONES UR STRIP-MCNC: NEGATIVE MG/DL
LDLC SERPL DIRECT ASSAY-MCNC: 61.1 MG/DL (ref 0–100)
LEUKOCYTE ESTERASE UR QL STRIP: NEGATIVE
LYMPHOCYTES # BLD AUTO: 3.2 THOUSANDS/ΜL (ref 0.6–4.47)
LYMPHOCYTES NFR BLD AUTO: 48 % (ref 21–51)
MCH RBC QN AUTO: 29.9 PG (ref 26–34)
MCHC RBC AUTO-ENTMCNC: 34.4 G/DL (ref 31–37)
MCV RBC AUTO: 87 FL (ref 81–99)
MICROALBUMIN UR-MCNC: <5 MG/L (ref 0–20)
MICROALBUMIN/CREAT 24H UR: <7 MG/G CREATININE (ref 0–30)
MONOCYTES # BLD AUTO: 0.5 THOUSAND/ΜL (ref 0.17–1.22)
MONOCYTES NFR BLD AUTO: 8 % (ref 2–12)
NEUTROPHILS # BLD AUTO: 2.8 THOUSANDS/ΜL (ref 1.4–6.5)
NEUTS SEG NFR BLD AUTO: 42 % (ref 42–75)
NITRITE UR QL STRIP: NEGATIVE
NON-SQ EPI CELLS URNS QL MICRO: ABNORMAL /HPF
PH UR STRIP.AUTO: 6 [PH]
PLATELET # BLD AUTO: 362 THOUSANDS/UL (ref 149–390)
PMV BLD AUTO: 7.6 FL (ref 8.6–11.7)
POTASSIUM SERPL-SCNC: 4 MMOL/L (ref 3.5–5.5)
PROT SERPL-MCNC: 7.4 G/DL (ref 6.4–8.9)
PROT UR STRIP-MCNC: NEGATIVE MG/DL
PROT UR-MCNC: <6 MG/DL
PROT/CREAT UR: <0.09 MG/G{CREAT} (ref 0–0.1)
RBC # BLD AUTO: 4.45 MILLION/UL (ref 3.9–5.2)
RBC #/AREA URNS AUTO: ABNORMAL /HPF
SODIUM SERPL-SCNC: 137 MMOL/L (ref 134–143)
SP GR UR STRIP.AUTO: 1.01 (ref 1–1.03)
TRIGL SERPL-MCNC: 151 MG/DL (ref 44–166)
URATE SERPL-MCNC: 7.3 MG/DL (ref 2.3–7.6)
UROBILINOGEN UR QL STRIP.AUTO: 0.2 E.U./DL
WBC # BLD AUTO: 6.6 THOUSAND/UL (ref 4.8–10.8)
WBC #/AREA URNS AUTO: ABNORMAL /HPF

## 2020-09-17 PROCEDURE — 82570 ASSAY OF URINE CREATININE: CPT

## 2020-09-17 PROCEDURE — 84550 ASSAY OF BLOOD/URIC ACID: CPT

## 2020-09-17 PROCEDURE — 86316 IMMUNOASSAY TUMOR OTHER: CPT

## 2020-09-17 PROCEDURE — 36415 COLL VENOUS BLD VENIPUNCTURE: CPT

## 2020-09-17 PROCEDURE — 84156 ASSAY OF PROTEIN URINE: CPT

## 2020-09-17 PROCEDURE — 80053 COMPREHEN METABOLIC PANEL: CPT

## 2020-09-17 PROCEDURE — 83721 ASSAY OF BLOOD LIPOPROTEIN: CPT

## 2020-09-17 PROCEDURE — 84478 ASSAY OF TRIGLYCERIDES: CPT

## 2020-09-17 PROCEDURE — 85025 COMPLETE CBC W/AUTO DIFF WBC: CPT

## 2020-09-17 PROCEDURE — 82043 UR ALBUMIN QUANTITATIVE: CPT

## 2020-09-17 PROCEDURE — 81001 URINALYSIS AUTO W/SCOPE: CPT

## 2020-09-21 LAB — CGA SERPL-MCNC: 65.5 NG/ML (ref 0–101.8)

## 2020-11-06 DIAGNOSIS — I10 ESSENTIAL HYPERTENSION: ICD-10-CM

## 2020-11-06 RX ORDER — HYDRALAZINE HYDROCHLORIDE 50 MG/1
50 TABLET, FILM COATED ORAL 3 TIMES DAILY
Qty: 90 TABLET | Refills: 3 | Status: SHIPPED | OUTPATIENT
Start: 2020-11-06 | End: 2021-08-10 | Stop reason: SDUPTHER

## 2020-11-06 RX ORDER — METOPROLOL TARTRATE 100 MG/1
TABLET ORAL
Qty: 180 TABLET | Refills: 3 | Status: SHIPPED | OUTPATIENT
Start: 2020-11-06 | End: 2021-08-10 | Stop reason: SDUPTHER

## 2020-11-09 ENCOUNTER — TELEPHONE (OUTPATIENT)
Dept: NEPHROLOGY | Facility: CLINIC | Age: 70
End: 2020-11-09

## 2020-11-14 ENCOUNTER — HOSPITAL ENCOUNTER (OUTPATIENT)
Dept: CT IMAGING | Facility: HOSPITAL | Age: 70
Discharge: HOME | End: 2020-11-14
Payer: COMMERCIAL

## 2020-11-14 DIAGNOSIS — C7A.8 PRIMARY MALIGNANT NEUROENDOCRINE TUMOR OF SMALL INTESTINE (HCC): ICD-10-CM

## 2020-11-14 PROCEDURE — 74177 CT ABD & PELVIS W/CONTRAST: CPT

## 2020-11-14 RX ADMIN — IOHEXOL 100 ML: 350 INJECTION, SOLUTION INTRAVENOUS at 09:06

## 2020-11-23 ENCOUNTER — TELEPHONE (OUTPATIENT)
Dept: SURGICAL ONCOLOGY | Facility: CLINIC | Age: 70
End: 2020-11-23

## 2020-11-25 ENCOUNTER — OFFICE VISIT (OUTPATIENT)
Dept: SURGICAL ONCOLOGY | Facility: CLINIC | Age: 70
End: 2020-11-25
Payer: COMMERCIAL

## 2020-11-25 VITALS
SYSTOLIC BLOOD PRESSURE: 142 MMHG | HEART RATE: 64 BPM | HEIGHT: 68 IN | DIASTOLIC BLOOD PRESSURE: 98 MMHG | BODY MASS INDEX: 31.83 KG/M2 | RESPIRATION RATE: 16 BRPM | WEIGHT: 210 LBS | TEMPERATURE: 98.4 F

## 2020-11-25 DIAGNOSIS — R93.5 ABNORMAL CT SCAN, PELVIS: ICD-10-CM

## 2020-11-25 DIAGNOSIS — Z85.9 HISTORY OF MALIGNANT NEUROENDOCRINE TUMOR: ICD-10-CM

## 2020-11-25 DIAGNOSIS — C7A.8 PRIMARY MALIGNANT NEUROENDOCRINE TUMOR OF SMALL INTESTINE (HCC): ICD-10-CM

## 2020-11-25 DIAGNOSIS — Z08 ENCOUNTER FOR FOLLOW-UP EXAMINATION AFTER COMPLETED TREATMENT FOR MALIGNANT NEOPLASM: Primary | ICD-10-CM

## 2020-11-25 PROCEDURE — 99214 OFFICE O/P EST MOD 30 MIN: CPT | Performed by: NURSE PRACTITIONER

## 2020-11-30 ENCOUNTER — HOSPITAL ENCOUNTER (OUTPATIENT)
Dept: ULTRASOUND IMAGING | Facility: HOSPITAL | Age: 70
Discharge: HOME | End: 2020-11-30
Payer: COMMERCIAL

## 2020-11-30 DIAGNOSIS — R93.5 ABNORMAL CT SCAN, PELVIS: ICD-10-CM

## 2020-11-30 PROCEDURE — 76856 US EXAM PELVIC COMPLETE: CPT

## 2020-11-30 PROCEDURE — 76830 TRANSVAGINAL US NON-OB: CPT

## 2020-12-01 ENCOUNTER — TELEPHONE (OUTPATIENT)
Dept: SURGICAL ONCOLOGY | Facility: CLINIC | Age: 70
End: 2020-12-01

## 2020-12-01 ENCOUNTER — PATIENT OUTREACH (OUTPATIENT)
Dept: CASE MANAGEMENT | Facility: HOSPITAL | Age: 70
End: 2020-12-01

## 2020-12-01 DIAGNOSIS — R93.89 ENDOMETRIAL THICKENING ON ULTRASOUND: Primary | ICD-10-CM

## 2020-12-07 ENCOUNTER — TELEPHONE (OUTPATIENT)
Dept: SURGICAL ONCOLOGY | Facility: CLINIC | Age: 70
End: 2020-12-07

## 2020-12-08 ENCOUNTER — CONSULT (OUTPATIENT)
Dept: GYNECOLOGIC ONCOLOGY | Facility: CLINIC | Age: 70
End: 2020-12-08
Payer: COMMERCIAL

## 2020-12-08 VITALS
RESPIRATION RATE: 18 BRPM | SYSTOLIC BLOOD PRESSURE: 198 MMHG | HEART RATE: 66 BPM | HEIGHT: 68 IN | BODY MASS INDEX: 32.48 KG/M2 | TEMPERATURE: 98.3 F | WEIGHT: 214.3 LBS | DIASTOLIC BLOOD PRESSURE: 102 MMHG

## 2020-12-08 DIAGNOSIS — R93.89 ENDOMETRIAL THICKENING ON ULTRASOUND: ICD-10-CM

## 2020-12-08 DIAGNOSIS — R93.89 THICKENED ENDOMETRIUM: Primary | ICD-10-CM

## 2020-12-08 PROCEDURE — 88175 CYTOPATH C/V AUTO FLUID REDO: CPT | Performed by: NURSE PRACTITIONER

## 2020-12-08 PROCEDURE — 88305 TISSUE EXAM BY PATHOLOGIST: CPT | Performed by: PATHOLOGY

## 2020-12-08 PROCEDURE — 58100 BIOPSY OF UTERUS LINING: CPT | Performed by: NURSE PRACTITIONER

## 2020-12-08 PROCEDURE — 99204 OFFICE O/P NEW MOD 45 MIN: CPT | Performed by: NURSE PRACTITIONER

## 2020-12-14 LAB
LAB AP GYN PRIMARY INTERPRETATION: NORMAL
Lab: NORMAL

## 2020-12-16 ENCOUNTER — TELEPHONE (OUTPATIENT)
Dept: GYNECOLOGIC ONCOLOGY | Facility: CLINIC | Age: 70
End: 2020-12-16

## 2020-12-16 DIAGNOSIS — R93.89 ENDOMETRIAL THICKENING ON ULTRASOUND: Primary | ICD-10-CM

## 2020-12-17 ENCOUNTER — TELEPHONE (OUTPATIENT)
Dept: GYNECOLOGIC ONCOLOGY | Facility: CLINIC | Age: 70
End: 2020-12-17

## 2020-12-22 ENCOUNTER — TELEPHONE (OUTPATIENT)
Dept: NEPHROLOGY | Facility: CLINIC | Age: 70
End: 2020-12-22

## 2020-12-22 DIAGNOSIS — Z20.828 VIRAL DISEASE EXPOSURE: Primary | ICD-10-CM

## 2020-12-24 DIAGNOSIS — Z20.828 VIRAL DISEASE EXPOSURE: ICD-10-CM

## 2020-12-24 DIAGNOSIS — Z20.828 VIRAL DISEASE EXPOSURE: Primary | ICD-10-CM

## 2020-12-24 PROCEDURE — U0003 INFECTIOUS AGENT DETECTION BY NUCLEIC ACID (DNA OR RNA); SEVERE ACUTE RESPIRATORY SYNDROME CORONAVIRUS 2 (SARS-COV-2) (CORONAVIRUS DISEASE [COVID-19]), AMPLIFIED PROBE TECHNIQUE, MAKING USE OF HIGH THROUGHPUT TECHNOLOGIES AS DESCRIBED BY CMS-2020-01-R: HCPCS | Performed by: INTERNAL MEDICINE

## 2020-12-28 LAB — SARS-COV-2 RNA SPEC QL NAA+PROBE: DETECTED

## 2021-02-02 ENCOUNTER — TELEPHONE (OUTPATIENT)
Dept: OTHER | Facility: OTHER | Age: 71
End: 2021-02-02

## 2021-02-23 ENCOUNTER — TRANSCRIBE ORDERS (OUTPATIENT)
Dept: LAB | Facility: HOSPITAL | Age: 71
End: 2021-02-23

## 2021-02-23 ENCOUNTER — LAB (OUTPATIENT)
Dept: LAB | Facility: HOSPITAL | Age: 71
End: 2021-02-23
Attending: INTERNAL MEDICINE
Payer: COMMERCIAL

## 2021-02-23 DIAGNOSIS — I10 ESSENTIAL HYPERTENSION: Primary | ICD-10-CM

## 2021-02-23 DIAGNOSIS — E83.9 CHRONIC KIDNEY DISEASE-MINERAL AND BONE DISORDER: ICD-10-CM

## 2021-02-23 DIAGNOSIS — Z20.828 VIRAL DISEASE EXPOSURE: ICD-10-CM

## 2021-02-23 DIAGNOSIS — R73.03 PREDIABETES: ICD-10-CM

## 2021-02-23 DIAGNOSIS — E78.5 HYPERLIPIDEMIA, UNSPECIFIED HYPERLIPIDEMIA TYPE: ICD-10-CM

## 2021-02-23 DIAGNOSIS — N18.31 STAGE 3A CHRONIC KIDNEY DISEASE (HCC): ICD-10-CM

## 2021-02-23 DIAGNOSIS — M89.9 CHRONIC KIDNEY DISEASE-MINERAL AND BONE DISORDER: ICD-10-CM

## 2021-02-23 DIAGNOSIS — N18.9 CHRONIC KIDNEY DISEASE-MINERAL AND BONE DISORDER: ICD-10-CM

## 2021-02-23 DIAGNOSIS — E55.9 VITAMIN D DEFICIENCY: ICD-10-CM

## 2021-02-23 PROCEDURE — 36415 COLL VENOUS BLD VENIPUNCTURE: CPT

## 2021-02-23 PROCEDURE — 86769 SARS-COV-2 COVID-19 ANTIBODY: CPT

## 2021-02-24 ENCOUNTER — APPOINTMENT (OUTPATIENT)
Dept: LAB | Facility: HOSPITAL | Age: 71
End: 2021-02-24
Payer: COMMERCIAL

## 2021-02-24 DIAGNOSIS — E55.9 VITAMIN D DEFICIENCY: ICD-10-CM

## 2021-02-24 DIAGNOSIS — N18.31 STAGE 3A CHRONIC KIDNEY DISEASE (HCC): ICD-10-CM

## 2021-02-24 DIAGNOSIS — R73.03 PREDIABETES: ICD-10-CM

## 2021-02-24 DIAGNOSIS — E83.9 CHRONIC KIDNEY DISEASE-MINERAL AND BONE DISORDER: ICD-10-CM

## 2021-02-24 DIAGNOSIS — E78.5 HYPERLIPIDEMIA, UNSPECIFIED HYPERLIPIDEMIA TYPE: ICD-10-CM

## 2021-02-24 DIAGNOSIS — M89.9 CHRONIC KIDNEY DISEASE-MINERAL AND BONE DISORDER: ICD-10-CM

## 2021-02-24 DIAGNOSIS — N18.9 CHRONIC KIDNEY DISEASE-MINERAL AND BONE DISORDER: ICD-10-CM

## 2021-02-24 DIAGNOSIS — I10 ESSENTIAL HYPERTENSION: ICD-10-CM

## 2021-02-24 LAB
25(OH)D3 SERPL-MCNC: 49.2 NG/ML (ref 30–100)
ALBUMIN SERPL BCP-MCNC: 4.2 G/DL (ref 3.5–5.7)
ALP SERPL-CCNC: 46 U/L (ref 55–165)
ALT SERPL W P-5'-P-CCNC: 11 U/L (ref 7–52)
ANION GAP SERPL CALCULATED.3IONS-SCNC: 7 MMOL/L (ref 4–13)
AST SERPL W P-5'-P-CCNC: 14 U/L (ref 13–39)
BACTERIA UR QL AUTO: ABNORMAL /HPF
BASOPHILS # BLD AUTO: 0 THOUSANDS/ΜL (ref 0–0.1)
BASOPHILS NFR BLD AUTO: 1 % (ref 0–2)
BILIRUB SERPL-MCNC: 0.6 MG/DL (ref 0.2–1)
BILIRUB UR QL STRIP: NEGATIVE
BUN SERPL-MCNC: 9 MG/DL (ref 7–25)
CALCIUM SERPL-MCNC: 9.2 MG/DL (ref 8.6–10.5)
CHLORIDE SERPL-SCNC: 98 MMOL/L (ref 98–107)
CHOLEST SERPL-MCNC: 129 MG/DL (ref 0–200)
CLARITY UR: CLEAR
CO2 SERPL-SCNC: 29 MMOL/L (ref 21–31)
COLOR UR: YELLOW
CREAT SERPL-MCNC: 1.04 MG/DL (ref 0.6–1.2)
CREAT UR-MCNC: 161 MG/DL
EOSINOPHIL # BLD AUTO: 0.1 THOUSAND/ΜL (ref 0–0.61)
EOSINOPHIL NFR BLD AUTO: 1 % (ref 0–5)
ERYTHROCYTE [DISTWIDTH] IN BLOOD BY AUTOMATED COUNT: 13.7 % (ref 11.5–14.5)
EST. AVERAGE GLUCOSE BLD GHB EST-MCNC: 111 MG/DL
GFR SERPL CREATININE-BSD FRML MDRD: 55 ML/MIN/1.73SQ M
GLUCOSE P FAST SERPL-MCNC: 95 MG/DL (ref 65–99)
GLUCOSE UR STRIP-MCNC: NEGATIVE MG/DL
HBA1C MFR BLD: 5.5 %
HCT VFR BLD AUTO: 37.5 % (ref 42–47)
HDLC SERPL-MCNC: 40 MG/DL
HGB BLD-MCNC: 12.6 G/DL (ref 12–16)
HGB UR QL STRIP.AUTO: NEGATIVE
KETONES UR STRIP-MCNC: NEGATIVE MG/DL
LDLC SERPL CALC-MCNC: 57 MG/DL (ref 0–100)
LEUKOCYTE ESTERASE UR QL STRIP: ABNORMAL
LYMPHOCYTES # BLD AUTO: 2.5 THOUSANDS/ΜL (ref 0.6–4.47)
LYMPHOCYTES NFR BLD AUTO: 41 % (ref 21–51)
MCH RBC QN AUTO: 29.7 PG (ref 26–34)
MCHC RBC AUTO-ENTMCNC: 33.6 G/DL (ref 31–37)
MCV RBC AUTO: 88 FL (ref 81–99)
MICROALBUMIN UR-MCNC: 34.6 MG/L (ref 0–20)
MICROALBUMIN/CREAT 24H UR: 21 MG/G CREATININE (ref 0–30)
MONOCYTES # BLD AUTO: 0.5 THOUSAND/ΜL (ref 0.17–1.22)
MONOCYTES NFR BLD AUTO: 7 % (ref 2–12)
NEUTROPHILS # BLD AUTO: 3 THOUSANDS/ΜL (ref 1.4–6.5)
NEUTS SEG NFR BLD AUTO: 49 % (ref 42–75)
NITRITE UR QL STRIP: NEGATIVE
NON-SQ EPI CELLS URNS QL MICRO: ABNORMAL /HPF
NONHDLC SERPL-MCNC: 89 MG/DL
PH UR STRIP.AUTO: 6 [PH]
PHOSPHATE SERPL-MCNC: 3.4 MG/DL (ref 3–5.5)
PLATELET # BLD AUTO: 415 THOUSANDS/UL (ref 149–390)
PMV BLD AUTO: 7.4 FL (ref 8.6–11.7)
POTASSIUM SERPL-SCNC: 4.2 MMOL/L (ref 3.5–5.5)
PROT SERPL-MCNC: 7.2 G/DL (ref 6.4–8.9)
PROT UR STRIP-MCNC: NEGATIVE MG/DL
PTH-INTACT SERPL-MCNC: 60.6 PG/ML (ref 18.4–80.1)
RBC # BLD AUTO: 4.24 MILLION/UL (ref 3.9–5.2)
RBC #/AREA URNS AUTO: ABNORMAL /HPF
SARS-COV-2 IGM SERPL QL IA: POSITIVE
SODIUM SERPL-SCNC: 134 MMOL/L (ref 134–143)
SP GR UR STRIP.AUTO: 1.01 (ref 1–1.03)
T4 FREE SERPL-MCNC: 1.01 NG/DL (ref 0.76–1.46)
TRIGL SERPL-MCNC: 158 MG/DL (ref 44–166)
TSH SERPL DL<=0.05 MIU/L-ACNC: 3.27 UIU/ML (ref 0.45–5.33)
UROBILINOGEN UR QL STRIP.AUTO: 0.2 E.U./DL
WBC # BLD AUTO: 6.1 THOUSAND/UL (ref 4.8–10.8)
WBC #/AREA URNS AUTO: ABNORMAL /HPF

## 2021-02-24 PROCEDURE — 36415 COLL VENOUS BLD VENIPUNCTURE: CPT

## 2021-02-24 PROCEDURE — 84100 ASSAY OF PHOSPHORUS: CPT

## 2021-02-24 PROCEDURE — 84443 ASSAY THYROID STIM HORMONE: CPT

## 2021-02-24 PROCEDURE — 84439 ASSAY OF FREE THYROXINE: CPT

## 2021-02-24 PROCEDURE — 81001 URINALYSIS AUTO W/SCOPE: CPT | Performed by: NURSE PRACTITIONER

## 2021-02-24 PROCEDURE — 80061 LIPID PANEL: CPT

## 2021-02-24 PROCEDURE — 80053 COMPREHEN METABOLIC PANEL: CPT

## 2021-02-24 PROCEDURE — 83970 ASSAY OF PARATHORMONE: CPT

## 2021-02-24 PROCEDURE — 85025 COMPLETE CBC W/AUTO DIFF WBC: CPT

## 2021-02-24 PROCEDURE — 82306 VITAMIN D 25 HYDROXY: CPT

## 2021-02-24 PROCEDURE — 82043 UR ALBUMIN QUANTITATIVE: CPT | Performed by: NURSE PRACTITIONER

## 2021-02-24 PROCEDURE — 83036 HEMOGLOBIN GLYCOSYLATED A1C: CPT

## 2021-02-24 PROCEDURE — 82570 ASSAY OF URINE CREATININE: CPT | Performed by: NURSE PRACTITIONER

## 2021-02-25 ENCOUNTER — TELEMEDICINE (OUTPATIENT)
Dept: NEPHROLOGY | Facility: CLINIC | Age: 71
End: 2021-02-25
Payer: COMMERCIAL

## 2021-02-25 VITALS
HEIGHT: 68 IN | WEIGHT: 206 LBS | BODY MASS INDEX: 31.22 KG/M2 | DIASTOLIC BLOOD PRESSURE: 82 MMHG | SYSTOLIC BLOOD PRESSURE: 123 MMHG

## 2021-02-25 DIAGNOSIS — C7A.00 CARCINOID TUMOR METASTATIC TO INTRA-ABDOMINAL LYMPH NODE (HCC): ICD-10-CM

## 2021-02-25 DIAGNOSIS — I16.0 HYPERTENSIVE URGENCY: ICD-10-CM

## 2021-02-25 DIAGNOSIS — I10 ESSENTIAL HYPERTENSION: ICD-10-CM

## 2021-02-25 DIAGNOSIS — Z85.9 HISTORY OF MALIGNANT NEUROENDOCRINE TUMOR: ICD-10-CM

## 2021-02-25 DIAGNOSIS — N18.31 STAGE 3A CHRONIC KIDNEY DISEASE (HCC): ICD-10-CM

## 2021-02-25 DIAGNOSIS — C7B.09 CARCINOID TUMOR METASTATIC TO INTRA-ABDOMINAL LYMPH NODE (HCC): ICD-10-CM

## 2021-02-25 DIAGNOSIS — J32.9 SINUSITIS, UNSPECIFIED CHRONICITY, UNSPECIFIED LOCATION: Primary | ICD-10-CM

## 2021-02-25 PROCEDURE — 99214 OFFICE O/P EST MOD 30 MIN: CPT | Performed by: INTERNAL MEDICINE

## 2021-02-25 RX ORDER — CEPHALEXIN 500 MG/1
500 CAPSULE ORAL EVERY 6 HOURS SCHEDULED
Status: SHIPPED | OUTPATIENT
Start: 2021-02-25

## 2021-02-25 RX ORDER — FLUTICASONE PROPIONATE 50 MCG
1 SPRAY, SUSPENSION (ML) NASAL DAILY
Qty: 1 BOTTLE | Refills: 3 | Status: SHIPPED | OUTPATIENT
Start: 2021-02-25 | End: 2021-08-10 | Stop reason: SDUPTHER

## 2021-02-25 NOTE — ASSESSMENT & PLAN NOTE
Lab Results   Component Value Date    EGFR 55 02/24/2021    EGFR 51 09/17/2020    EGFR 64 05/02/2020    CREATININE 1 04 02/24/2021    CREATININE 1 11 09/17/2020    CREATININE 0 92 05/02/2020   Kidney function is stable with a creatinine of1 3-1 5 mg/dl   She is nonoliguric and avoids nephrotoxins  List sent of safe OTC meds to take Type Of Destruction Used: Curettage Consent: Verbal consent was obtained and risks were reviewed including but not limited to scarring, infection, bleeding, scabbing, incomplete removal, nerve damage and allergy to anesthesia. Hide Biopsy Depth?: No Electrodesiccation And Curettage Text: The wound bed was treated with electrodesiccation and curettage after the biopsy was performed. Anesthesia Volume In Cc (Will Not Render If 0): 0 Detail Level: Detailed Render Post-Care Instructions In Note?: yes Dressing: bandage Biopsy Method: Dermablade Silver Nitrate Text: The wound bed was treated with silver nitrate after the biopsy was performed. Hemostasis: Drysol Billing Type: Third-Party Bill Detail Level: Detailed Curettage Text: The wound bed was treated with curettage after the biopsy was performed. Lab: [id number] Cryotherapy Text: The wound bed was treated with cryotherapy after the biopsy was performed. Post-Care Instructions: I reviewed with the patient in detail post-care instructions. Patient is to keep the biopsy site dry overnight, and then apply aquaphor or vaseline twice daily until healed. Patient may apply hydrogen peroxide soaks to remove any crusting. Size Of Lesion In Cm: 1.3 Anesthesia Type: 2% lidocaine with epinephrine and a 1:12 solution of 8.4% sodium bicarbonate Lab Facility: [id number] Wound Care: Petrolatum Notification Instructions: Patient will be notified of biopsy results. However, patient instructed to call the office if not contacted within 2 weeks. Depth Of Biopsy: dermis Electrodesiccation Text: The wound bed was treated with electrodesiccation after the biopsy was performed. Biopsy Type: H and E

## 2021-02-25 NOTE — PATIENT INSTRUCTIONS
Call Candice Sanford at [facility] for an appt for regular health maintenance issues    Follow up with us for blood pressure and chronic kidney disease    Labs were stable  You are still carrying the acute antibody for Covid but do not have active Covid    Vaccine 3 months from your prior infection

## 2021-03-30 DIAGNOSIS — Z23 ENCOUNTER FOR IMMUNIZATION: ICD-10-CM

## 2021-05-04 DIAGNOSIS — I10 ESSENTIAL HYPERTENSION: ICD-10-CM

## 2021-05-04 RX ORDER — LISINOPRIL 20 MG/1
20 TABLET ORAL 2 TIMES DAILY
Qty: 180 TABLET | Refills: 3 | Status: SHIPPED | OUTPATIENT
Start: 2021-05-04 | End: 2021-08-10 | Stop reason: SDUPTHER

## 2021-05-25 ENCOUNTER — HOSPITAL ENCOUNTER (OUTPATIENT)
Dept: CT IMAGING | Facility: HOSPITAL | Age: 71
Discharge: HOME | End: 2021-05-25
Payer: COMMERCIAL

## 2021-05-25 DIAGNOSIS — Z85.9 HISTORY OF MALIGNANT NEUROENDOCRINE TUMOR: ICD-10-CM

## 2021-05-25 PROCEDURE — G1004 CDSM NDSC: HCPCS

## 2021-05-25 PROCEDURE — 74177 CT ABD & PELVIS W/CONTRAST: CPT

## 2021-05-25 RX ADMIN — IOHEXOL 100 ML: 350 INJECTION, SOLUTION INTRAVENOUS at 09:31

## 2021-06-14 ENCOUNTER — TELEPHONE (OUTPATIENT)
Dept: HEMATOLOGY ONCOLOGY | Facility: CLINIC | Age: 71
End: 2021-06-14

## 2021-06-14 NOTE — TELEPHONE ENCOUNTER
Appointment Confirmation     Appointment with  Michelle Hansen   Appointment date & time 06/15 at 2:15pm   [facility]   Patient verbilized Understanding  yes

## 2021-06-15 ENCOUNTER — OFFICE VISIT (OUTPATIENT)
Dept: SURGICAL ONCOLOGY | Facility: CLINIC | Age: 71
End: 2021-06-15
Payer: COMMERCIAL

## 2021-06-15 VITALS
OXYGEN SATURATION: 97 % | BODY MASS INDEX: 32.16 KG/M2 | WEIGHT: 212.2 LBS | HEART RATE: 74 BPM | RESPIRATION RATE: 16 BRPM | HEIGHT: 68 IN | DIASTOLIC BLOOD PRESSURE: 86 MMHG | TEMPERATURE: 98.1 F | SYSTOLIC BLOOD PRESSURE: 122 MMHG

## 2021-06-15 DIAGNOSIS — C7B.09 CARCINOID TUMOR METASTATIC TO INTRA-ABDOMINAL LYMPH NODE (HCC): ICD-10-CM

## 2021-06-15 DIAGNOSIS — C7A.00 CARCINOID TUMOR METASTATIC TO INTRA-ABDOMINAL LYMPH NODE (HCC): ICD-10-CM

## 2021-06-15 DIAGNOSIS — Z08 ENCOUNTER FOR FOLLOW-UP EXAMINATION AFTER COMPLETED TREATMENT FOR MALIGNANT NEOPLASM: Primary | ICD-10-CM

## 2021-06-15 DIAGNOSIS — Z85.9 HISTORY OF MALIGNANT NEUROENDOCRINE TUMOR: ICD-10-CM

## 2021-06-15 PROCEDURE — 1160F RVW MEDS BY RX/DR IN RCRD: CPT | Performed by: SURGERY

## 2021-06-15 PROCEDURE — 99214 OFFICE O/P EST MOD 30 MIN: CPT | Performed by: SURGERY

## 2021-06-15 NOTE — PROGRESS NOTES
Surgical Oncology Follow Up       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 ([address] )  -     CT chest abdomen pelvis w contrast; Future  -     BUN; Future  -     Creatinine, serum; Future  -     Chromogranin A; Future        No chief complaint on file  Return in about 6 months (around 12/15/2021) for Office Visit, Imaging - See orders, Labs - See Treatment Plan  Oncology History   History of malignant neuroendocrine tumor   3/24/2020 Initial Diagnosis    Primary malignant neuroendocrine tumor of small intestine ([facility] [address] )     4/29/2020 Surgery    Small bowel and mesenteric mass, resection:       - Well-differentiated neuroendocrine tumor, 2 4 cm, G2        - Lymph-vascular and perineural invasion are identified  - 4 of 13 lymph nodes positive for metastatic well-differentiated neuroendocrine tumor (4/13)  - Largest lymph node deposit: 1 5 cm with extra-emigdio extension, (mesenteric mass)  - 3 mitotic figures per 2 mm2, (consistent with grade G2)  - The tumor is extremely close to the serosal surface (approximately   0003 cm from serosal surface)  Radiation    The patient saw @[employer] for radiation treatment  This is the current list of radiation treatment:  Radiation Treatments     No radiation treatments to show   (Treatments may have been administered in another system )            Chemotherapy    [No treatment plan]         Staging: [id number] well-differentiated neuroendocrine tumor of the ileum, April 2020  Treatment history:  Small-bowel resection with mesenteric nodes, April 2020  Current treatment:  Observation  Possible octreotide  Disease status: KAREN  Stable adrenal nodule    History of Present Illness:  Patient returns in follow-up of her neuroendocrine tumor  She is doing well at this time with no complaints  She denies any abdominal pain, nausea or vomiting  No flushing, diarrhea, palpitations, headaches, or sweats  CT from May 25, 2021 reveals no obvious recurrence  There were subcentimeter lymph nodes in the right lower quadrant that are stable  There was some thickening near the anastomosis  It is unclear if this is just postsurgical or recurrent tumor  Repeat imaging was recommended in 3 months  I personally reviewed the films  Review of Systems  Complete ROS Surg Onc:   Complete ROS Surg Onc:   Constitutional: The patient denies new or recent history of general fatigue, no recent weight loss, no change in appetite  Eyes: No complaints of visual problems, no scleral icterus  ENT: no complaints of ear pain, no hoarseness, no difficulty swallowing,  no tinnitus and no new masses in head, oral cavity, or neck  Cardiovascular: No complaints of chest pain, no palpitations, no ankle edema  Respiratory: No complaints of shortness of breath, no cough  Gastrointestinal: No complaints of jaundice, no bloody stools, no pale stools  Genitourinary: No complaints of dysuria, no hematuria, no nocturia, no frequent urination, no urethral discharge  Musculoskeletal: No complaints of weakness, paralysis, joint stiffness or arthralgias  Integumentary: No complaints of rash, no new lesions  Neurological: No complaints of convulsions, no seizures, no dizziness  Hematologic/Lymphatic: No complaints of easy bruising  Endocrine:  No hot or cold intolerance  No polydipsia, polyphagia, or polyuria    Allergy/immunology:  No environmental allergies  No food allergies  Not immunocompromised  Skin:  No pallor or rash  No wound          Patient Active Problem List   Diagnosis    Rectal cancer ([facility] [address] )    Colitis    Pure hypercholesterolemia    Encounter for administration of vaccine    Hypertensive urgency    Essential hypertension    Stage 3 chronic kidney disease (HCC)    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)    History of malignant neuroendocrine tumor    Adrenal nodule (HCC)    Acute renal failure (HCC)    Stage 2 chronic kidney disease    Encounter for follow-up examination after completed treatment for malignant neoplasm    Thickened endometrium     Past Medical History:   Diagnosis Date    Abnormal weight gain     Anxiety     Benign essential hypertension     Chronic kidney disease, stage 3 (HCC)     Colon polyps     Essential hypertension     Hematuria syndrome     Hyperlipidemia     Hypertension     Morbid obesity ([facility] [address] )     Osteoarthritis     Proteinuria     Vitamin D deficiency      Past Surgical History:   Procedure Laterality Date    BREAST BIOPSY Right 2017    benign    COLONOSCOPY  12/2018    3 polyps     COLONOSCOPY      several    MAMMO STEREOTACTIC BREAST BIOPSY LEFT (ALL INC) Left 6/18/2020    MAMMO STEREOTACTIC BREAST BIOPSY RIGHT (ALL INC) Right 6/18/2020    NJ LAP,DIAGNOSTIC ABDOMEN N/A 3/24/2020    Procedure: 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;  Surgeon: Vaishali Clark MD;  Location: [address] [place] [address] MAIN OR;  Service: General    RECTAL BIOPSY N/A 2/9/2018    Procedure: TRANSANAL EXCISION RECTAL POLYP;MUCOSAL POLYPECTOMY;  Surgeon: Andrews Santos MD;  Location: [facility] MAIN OR;  Service: Colorectal    SMALL INTESTINE SURGERY N/A 4/29/2020    Procedure: RESECTION SMALL BOWEL;  Surgeon: Dempsey Fabry, MD;  Location: [facility] MAIN OR;  Service: Surgical Oncology     Family History   Problem Relation Age of Onset    Hypertension Mother    [facility] Heart disease Father     No Known Problems Daughter     No Known Problems Maternal Grandmother     No Known Problems Maternal Grandfather     No Known Problems Paternal Grandmother     No Known Problems Paternal Grandfather     Pancreatic cancer Brother     No Known Problems Maternal Aunt     No Known Problems Paternal Aunt     No Known Problems Paternal Aunt      Social History     Socioeconomic History    Marital status: /Civil Union     Spouse name: Not on file    Number of children: Not on file    Years of education: Not on file    Highest education level: Not on file   Occupational History    Not on file   Tobacco Use    Smoking status: Former Smoker     Quit date:      Years since quittin 4    Smokeless tobacco: Never Used    Tobacco comment: 27  YEARS AGO    Vaping Use    Vaping Use: Never used   Substance and Sexual Activity    Alcohol use: Not Currently     Comment: occasional    Drug use: Never    Sexual activity: Not on file   Other Topics Concern    Not on file   Social History Narrative    Not on file     Social Determinants of Health     Financial Resource Strain:     Difficulty of Paying Living Expenses:    Food Insecurity:     Worried About Running Out of Food in the Last Year:     Ran Out of Food in the Last Year:    Transportation Needs:     Lack of Transportation (Medical):      Lack of Transportation (Non-Medical):    Physical Activity:     Days of Exercise per Week:     Minutes of Exercise per Session:    Stress:     Feeling of Stress :    Social Connections:     Frequency of Communication with Friends and Family:     Frequency of Social Gatherings with Friends and Family:     Attends Hindu Services:     Active Member of Clubs or Organizations:     Attends Club or Organization Meetings:     Marital Status:    Intimate Partner Violence:     Fear of Current or Ex-Partner:     Emotionally Abused:     Physically Abused:     Sexually Abused: Current Outpatient Medications:     Cholecalciferol (VITAMIN D3) 50 MCG (2000 UT) capsule, Take 2,000 Units by mouth every morning , Disp: , Rfl:     co-enzyme Q-10 50 MG capsule, Take 50 mg by mouth every other day, Disp: , Rfl:     fluticasone (FLONASE) 50 mcg/act nasal spray, 1 spray into each nostril daily, Disp: 1 Bottle, Rfl: 3    hydrALAZINE (APRESOLINE) 50 mg tablet, Take 1 tablet (50 mg total) by mouth 3 (three) times a day, Disp: 90 tablet, Rfl: 3    lisinopril (ZESTRIL) 20 mg tablet, Take 1 tablet (20 mg total) by mouth 2 (two) times a day, Disp: 180 tablet, Rfl: 3    metoprolol tartrate (LOPRESSOR) 100 mg tablet, Take 1 tablet by mouth twice daily, Disp: 180 tablet, Rfl: 3    rosuvastatin (CRESTOR) 20 MG tablet, Take 1 tablet (20 mg total) by mouth every other day, Disp: 90 tablet, Rfl: 3    Current Facility-Administered Medications:     cephalexin (KEFLEX) capsule 500 mg, 500 mg, Oral, Q6H [facility] [place] Tim MD  No Known Allergies  Vitals:    06/15/21 1423   BP: 122/86   Pulse: 74   Resp: 16   Temp: 98 1 °F (36 7 °C)   SpO2: 97%       Physical Exam  Constitutional: General appearance: The Patient is well-developed and well-nourished who appears the stated age in no acute distress  Patient is pleasant and talkative  HEENT:  Normocephalic  Sclerae are anicteric  Mucous membranes are moist  Neck is supple without adenopathy  No JVD  Chest: The lungs are clear to auscultation  Cardiac: Heart is regular rate  Abdomen: Abdomen is soft, non-tender, non-distended and without masses  Extremities: There is no clubbing or cyanosis  There is no edema  Symmetric  Neuro: Grossly nonfocal  Gait is normal      Lymphatic: No evidence of cervical adenopathy bilaterally  No evidence of axillary adenopathy bilaterally  No evidence of inguinal adenopathy bilaterally  Skin: Warm, anicteric      Psych:  Patient is pleasant and talkative  Breasts:        Pathology:  [unfilled]    Labs:      Imaging  CT abdomen pelvis w contrast    Result Date: 5/30/2021  Narrative: CT ABDOMEN AND PELVIS WITH IV CONTRAST INDICATION:   Z85 9: Personal history of malignant neoplasm, unspecified  History of intestinal neuroendocrine tumor status post surgery COMPARISON:  CT dated 11/14/2020 and CT dated 11/8/2017  TECHNIQUE:  CT examination of the abdomen and pelvis was performed  Axial, sagittal, and coronal 2D reformatted images were created from the source data and submitted for interpretation  Radiation dose length product (DLP) for this visit:  637 5 mGy-cm   This examination, like all CT scans [facility], was performed utilizing techniques to minimize radiation dose exposure, including the use of iterative reconstruction and automated exposure control  IV Contrast:  100 mL of iohexol (OMNIPAQUE) Enteric Contrast:  Enteric contrast was not administered  FINDINGS: ABDOMEN LOWER CHEST:  No clinically significant abnormality identified in the visualized lower chest  LIVER/BILIARY TREE:  Again seen is a subcentimeter hypodensity in hepatic segment 8, which is unchanged compared to the previous study  No suspicious liver lesions visualized  GALLBLADDER:  No calcified gallstones  No pericholecystic inflammatory change  SPLEEN:  Again seen is a 1 7 cm hypodense lesion in the anterior aspect of the spleen on series 2 image #17, which is unchanged compared to multiple prior studies and therefore likely benign  PANCREAS:  Unremarkable  ADRENAL GLANDS:  Redemonstration of a 1 3 cm right adrenal nodule, unchanged compared to multiple prior studies and compatible with an adenoma  KIDNEYS/URETERS:  No hydronephrosis or urinary tract calculus  One or more sharply circumscribed subcentimeter renal hypodensities are present, too small to accurately characterize, and statistically most likely benign findings   According to recent literature (Radiology 2019) no further workup of these findings is recommended  STOMACH AND BOWEL:  Again seen is small bowel an anastomosis in the right lower quadrant with interval development of soft tissue focal thickening at the anastomosis measuring 1 7 x 1 0 cm (series 4, image #48)  APPENDIX:  No findings to suggest appendicitis  ABDOMINOPELVIC CAVITY:  Again seen are adjacent subcentimeter mesenteric lymph nodes in the right lower quadrant, similar in appearance compared to the previous study from 11/14/2020  VESSELS:  Unremarkable for patient's age  PELVIS REPRODUCTIVE ORGANS:  Again seen is endometrial thickening; please refer to previous pelvic ultrasound for additional detail and recommendation  URINARY BLADDER:  Unremarkable  ABDOMINAL WALL/INGUINAL REGIONS:  Again seen is a 7 6 x 2 8 cm right psoas intramuscular cysts, not significantly changed compared to multiple prior studies dating back to 2017  OSSEOUS STRUCTURES:  No acute fracture or destructive osseous lesion  Right hip osteoarthritis  Multilevel spondylosis  Impression: 1  Small bowel anastomosis in the right lower quadrant with possible interval development of soft tissue focal thickening at the anastomosis  Differential diagnosis includes underdistention of the bowel, postoperative scarring or residual/recurrent disease  Recommend attention to this area on subsequent short-term follow-up in 3 months  2   Redemonstration of endometrial thickening; refer to previous pelvic ultrasound for additional details and recommendation  The study was marked in EPIC for significant notification  Workstation performed: [id number]     I reviewed the above laboratory and imaging data  Discussion/Summary: 71-year-old female status post small bowel resection and resection of a mesenteric mass for a [id number] 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  She is doing very well    There was asymmetric uptake on her right adrenal gland   Her pheochromocytoma workup was negative  I suspect this is not related to her neuroendocrine tumor  There was also some patchy uptake in the left perihilar and right inguinal nodes and these were felt to be reactive  I do not think she requires any further treatment at this point rather than observation  I doubt she has local recurrence based on her CT  I will plan on repeating her chromogranin level now  We will call her with those results  Assuming this is normal, I will plan on seeing her again in 6 months with a repeat CT and chromogranin level  She is agreeable to this plan  All her questions were answered

## 2021-06-15 NOTE — LETTER
Anne 15, 2021     [place] Kehr[facility] [address]    Patient: Damien Mcmillan   YOB: 1950   Date of Visit: 6/15/2021       Dear Dr Deangelo Comsb:    Thank you for referring Raymond Aguilar to me for evaluation  Below are my notes for this consultation  If you have questions, please do not hesitate to call me  I look forward to following your patient along with you  Sincerely,        Ilia Baker MD        CC: No Recipients  Ilia Baker MD  6/15/2021  2:51 PM  Sign when Signing Visit               Surgical Oncology Follow Up       [address] [facility] [address] [address]  [phone]    Damien Mcmillan  1950  [id number]  [facility] [address] [id number] [phone]    Diagnoses and all orders for this visit:    Encounter for follow-up examination after completed treatment for malignant neoplasm    History of malignant neuroendocrine tumor  -     Chromogranin A; Future    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 ([address] )  -     CT chest abdomen pelvis w contrast; Future  -     BUN; Future  -     Creatinine, serum; Future  -     Chromogranin A; Future        No chief complaint on file  Return in about 6 months (around 12/15/2021) for Office Visit, Imaging - See orders, Labs - See Treatment Plan  Oncology History   History of malignant neuroendocrine tumor   3/24/2020 Initial Diagnosis    Primary malignant neuroendocrine tumor of small intestine ([address] )     4/29/2020 Surgery    Small bowel and mesenteric mass, resection:       - Well-differentiated neuroendocrine tumor, 2 4 cm, G2        - Lymph-vascular and perineural invasion are identified  - 4 of 13 lymph nodes positive for metastatic well-differentiated neuroendocrine tumor (4/13)         - Largest lymph node deposit: 1 5 cm with extra-emigdio extension, (mesenteric mass)  - 3 mitotic figures per 2 mm2, (consistent with grade G2)  - The tumor is extremely close to the serosal surface (approximately   0003 cm from serosal surface)  Radiation    The patient saw @[email or web address]@ for radiation treatment  This is the current list of radiation treatment:  Radiation Treatments     No radiation treatments to show  (Treatments may have been administered in another system )            Chemotherapy    [No treatment plan]         Staging: [id number] well-differentiated neuroendocrine tumor of the ileum, April 2020  Treatment history:  Small-bowel resection with mesenteric nodes, April 2020  Current treatment:  Observation  Possible octreotide  Disease status: KAREN  Stable adrenal nodule    History of Present Illness:  Patient returns in follow-up of her neuroendocrine tumor  She is doing well at this time with no complaints  She denies any abdominal pain, nausea or vomiting  No flushing, diarrhea, palpitations, headaches, or sweats  CT from May 25, 2021 reveals no obvious recurrence  There were subcentimeter lymph nodes in the right lower quadrant that are stable  There was some thickening near the anastomosis  It is unclear if this is just postsurgical or recurrent tumor  Repeat imaging was recommended in 3 months  I personally reviewed the films  Review of Systems  Complete ROS Surg Onc:   Complete ROS Surg Onc:   Constitutional: The patient denies new or recent history of general fatigue, no recent weight loss, no change in appetite  Eyes: No complaints of visual problems, no scleral icterus  ENT: no complaints of ear pain, no hoarseness, no difficulty swallowing,  no tinnitus and no new masses in head, oral cavity, or neck  Cardiovascular: No complaints of chest pain, no palpitations, no ankle edema  Respiratory: No complaints of shortness of breath, no cough     Gastrointestinal: No complaints of jaundice, no bloody stools, no pale stools  Genitourinary: No complaints of dysuria, no hematuria, no nocturia, no frequent urination, no urethral discharge  Musculoskeletal: No complaints of weakness, paralysis, joint stiffness or arthralgias  Integumentary: No complaints of rash, no new lesions  Neurological: No complaints of convulsions, no seizures, no dizziness  Hematologic/Lymphatic: No complaints of easy bruising  Endocrine:  No hot or cold intolerance  No polydipsia, polyphagia, or polyuria  Allergy/immunology:  No environmental allergies  No food allergies  Not immunocompromised  Skin:  No pallor or rash  No wound          Patient Active Problem List   Diagnosis    Rectal cancer 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 )     Osteoarthritis     Proteinuria     Vitamin D deficiency      Past Surgical History:   Procedure Laterality Date    BREAST BIOPSY Right 2017    benign    COLONOSCOPY  12/2018    3 polyps     COLONOSCOPY      several    MAMMO STEREOTACTIC BREAST BIOPSY LEFT (ALL INC) Left 6/18/2020    MAMMO STEREOTACTIC BREAST BIOPSY RIGHT (ALL INC) Right 6/18/2020    CA LAP,DIAGNOSTIC ABDOMEN N/A 3/24/2020    Procedure: 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;  Surgeon: Pradeep Leon MD;  Location: [facility] MAIN OR;  Service: General    RECTAL BIOPSY N/A 2018    Procedure: TRANSANAL EXCISION RECTAL POLYP;MUCOSAL POLYPECTOMY;  Surgeon: Karina Gonzales MD;  Location: [facility] MAIN [facility];  Service: Colorectal    SMALL INTESTINE SURGERY N/A 2020    Procedure: RESECTION SMALL BOWEL;  Surgeon: Katia Varela MD;  Location: [facility] MAIN [facility];  Service: Surgical Oncology     Family History   Problem Relation Age of Onset    Hypertension Mother     Heart disease Father     No Known Problems Daughter     No Known Problems Maternal Grandmother     No Known Problems Maternal Grandfather     No Known Problems Paternal Grandmother     No Known Problems Paternal Grandfather     Pancreatic cancer Brother     No Known Problems Maternal Aunt     No Known Problems Paternal Aunt     No Known Problems Paternal Aunt      Social History     Socioeconomic History    Marital status: /Civil Union     Spouse name: Not on file    Number of children: Not on file    Years of education: Not on file    Highest education level: Not on file   Occupational History    Not on file   Tobacco Use    Smoking status: Former Smoker     Quit date:      Years since quittin 4    Smokeless tobacco: Never Used    Tobacco comment: 27  YEARS AGO    Vaping Use    Vaping Use: Never used   Substance and Sexual Activity    Alcohol use: Not Currently     Comment: occasional    Drug use: Never    Sexual activity: Not on file   Other Topics Concern    Not on file   Social History Narrative    Not on file     Social Determinants of Health     Financial Resource Strain:     Difficulty of Paying Living Expenses:    Food Insecurity:     Worried About Running Out of Food in the Last Year:     [address] in the Last Year:    Transportation Needs:     Lack of Transportation (Medical):      Lack of Transportation (Non-Medical): Physical Activity:     Days of Exercise per Week:     Minutes of Exercise per Session:    Stress:     Feeling of Stress :    Social Connections:     Frequency of Communication with Friends and Family:     Frequency of Social Gatherings with Friends and Family:     Attends Buddhism Services:     Active Member of Clubs or Organizations:     Attends Club or Organization Meetings:     Marital Status:    Intimate Partner Violence:     Fear of Current or Ex-Partner:     Emotionally Abused:     Physically Abused:     Sexually Abused:        Current Outpatient Medications:     Cholecalciferol (VITAMIN D3) 50 MCG (2000 UT) capsule, Take 2,000 Units by mouth every morning , Disp: , Rfl:     co-enzyme Q-10 50 MG capsule, Take 50 mg by mouth every other day, Disp: , Rfl:     fluticasone (FLONASE) 50 mcg/act nasal spray, 1 spray into each nostril daily, Disp: 1 Bottle, Rfl: 3    hydrALAZINE (APRESOLINE) 50 mg tablet, Take 1 tablet (50 mg total) by mouth 3 (three) times a day, Disp: 90 tablet, Rfl: 3    lisinopril (ZESTRIL) 20 mg tablet, Take 1 tablet (20 mg total) by mouth 2 (two) times a day, Disp: 180 tablet, Rfl: 3    metoprolol tartrate (LOPRESSOR) 100 mg tablet, Take 1 tablet by mouth twice daily, Disp: 180 tablet, Rfl: 3    rosuvastatin (CRESTOR) 20 MG tablet, Take 1 tablet (20 mg total) by mouth every other day, Disp: 90 tablet, Rfl: 3    Current Facility-Administered Medications:     cephalexin (KEFLEX) capsule 500 mg, 500 mg, Oral, Q6H [facility], Amado Parker MD  No Known Allergies  Vitals:    06/15/21 1423   BP: 122/86   Pulse: 74   Resp: 16   Temp: 98 1 °F (36 7 °C)   SpO2: 97%       Physical Exam  Constitutional: General appearance: The Patient is well-developed and well-nourished who appears the stated age in no acute distress  Patient is pleasant and talkative  HEENT:  Normocephalic  Sclerae are anicteric  Mucous membranes are moist  Neck is supple without adenopathy  No JVD       Chest: The lungs are clear to auscultation  Cardiac: Heart is regular rate  Abdomen: Abdomen is soft, non-tender, non-distended and without masses  Extremities: There is no clubbing or cyanosis  There is no edema  Symmetric  Neuro: Grossly nonfocal  Gait is normal      Lymphatic: No evidence of cervical adenopathy bilaterally  No evidence of axillary adenopathy bilaterally  No evidence of inguinal adenopathy bilaterally  Skin: Warm, anicteric  Psych:  Patient is pleasant and talkative  Breasts:        Pathology:  [unfilled]    Labs:      Imaging  CT abdomen pelvis w contrast    Result Date: 5/30/2021  Narrative: CT ABDOMEN AND PELVIS WITH IV CONTRAST INDICATION:   Z85 9: Personal history of malignant neoplasm, unspecified  History of intestinal neuroendocrine tumor status post surgery COMPARISON:  CT dated 11/14/2020 and CT dated 11/8/2017  TECHNIQUE:  CT examination of the abdomen and pelvis was performed  Axial, sagittal, and coronal 2D reformatted images were created from the source data and submitted for interpretation  Radiation dose length product (DLP) for this visit:  637 5 mGy-cm   This examination, like all CT scans performed in the [facility], was performed utilizing techniques to minimize radiation dose exposure, including the use of iterative reconstruction and automated exposure control  IV Contrast:  100 mL of iohexol (OMNIPAQUE) Enteric Contrast:  Enteric contrast was not administered  FINDINGS: ABDOMEN LOWER CHEST:  No clinically significant abnormality identified in the visualized lower chest  LIVER/BILIARY TREE:  Again seen is a subcentimeter hypodensity in hepatic segment 8, which is unchanged compared to the previous study  No suspicious liver lesions visualized  GALLBLADDER:  No calcified gallstones  No pericholecystic inflammatory change   SPLEEN:  Again seen is a 1 7 cm hypodense lesion in the anterior aspect of the spleen on series 2 image #17, which is unchanged compared to multiple prior studies and therefore likely benign  PANCREAS:  Unremarkable  ADRENAL GLANDS:  Redemonstration of a 1 3 cm right adrenal nodule, unchanged compared to multiple prior studies and compatible with an adenoma  KIDNEYS/URETERS:  No hydronephrosis or urinary tract calculus  One or more sharply circumscribed subcentimeter renal hypodensities are present, too small to accurately characterize, and statistically most likely benign findings  According to recent literature (Radiology 2019) no further workup of these findings is recommended  STOMACH AND BOWEL:  Again seen is small bowel an anastomosis in the right lower quadrant with interval development of soft tissue focal thickening at the anastomosis measuring 1 7 x 1 0 cm (series 4, image #48)  APPENDIX:  No findings to suggest appendicitis  ABDOMINOPELVIC CAVITY:  Again seen are adjacent subcentimeter mesenteric lymph nodes in the right lower quadrant, similar in appearance compared to the previous study from 11/14/2020  VESSELS:  Unremarkable for patient's age  PELVIS REPRODUCTIVE ORGANS:  Again seen is endometrial thickening; please refer to previous pelvic ultrasound for additional detail and recommendation  URINARY BLADDER:  Unremarkable  ABDOMINAL WALL/INGUINAL REGIONS:  Again seen is a 7 6 x 2 8 cm right psoas intramuscular cysts, not significantly changed compared to multiple prior studies dating back to 2017  OSSEOUS STRUCTURES:  No acute fracture or destructive osseous lesion  Right hip osteoarthritis  Multilevel spondylosis  Impression: 1  Small bowel anastomosis in the right lower quadrant with possible interval development of soft tissue focal thickening at the anastomosis  Differential diagnosis includes underdistention of the bowel, postoperative scarring or residual/recurrent disease  Recommend attention to this area on subsequent short-term follow-up in 3 months   2   Redemonstration of endometrial thickening; refer to previous pelvic ultrasound for additional details and recommendation  The study was marked in EPIC for significant notification  Workstation performed: [id number]     I reviewed the above laboratory and imaging data  Discussion/Summary: 67-year-old female status post small bowel resection and resection of a mesenteric mass for a [id number] 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  She is doing very well  There was asymmetric uptake on her right adrenal gland  Her pheochromocytoma workup was negative  I suspect this is not related to her neuroendocrine tumor  There was also some patchy uptake in the left perihilar and right inguinal nodes and these were felt to be reactive  I do not think she requires any further treatment at this point rather than observation  I doubt she has local recurrence based on her CT  I will plan on repeating her chromogranin level now  We will call her with those results  Assuming this is normal, I will plan on seeing her again in 6 months with a repeat CT and chromogranin level  She is agreeable to this plan  All her questions were answered

## 2021-06-17 ENCOUNTER — APPOINTMENT (OUTPATIENT)
Dept: LAB | Facility: HOSPITAL | Age: 71
End: 2021-06-17
Payer: COMMERCIAL

## 2021-06-17 DIAGNOSIS — Z85.9 HISTORY OF MALIGNANT NEUROENDOCRINE TUMOR: ICD-10-CM

## 2021-06-17 LAB
BUN SERPL-MCNC: 14 MG/DL (ref 7–25)
CREAT SERPL-MCNC: 1.08 MG/DL (ref 0.6–1.2)
GFR SERPL CREATININE-BSD FRML MDRD: 53 ML/MIN/1.73SQ M

## 2021-06-17 PROCEDURE — 36415 COLL VENOUS BLD VENIPUNCTURE: CPT

## 2021-06-17 PROCEDURE — 84520 ASSAY OF UREA NITROGEN: CPT

## 2021-06-17 PROCEDURE — 82565 ASSAY OF CREATININE: CPT

## 2021-06-21 ENCOUNTER — TELEPHONE (OUTPATIENT)
Dept: SURGICAL ONCOLOGY | Facility: CLINIC | Age: 71
End: 2021-06-21

## 2021-06-21 NOTE — TELEPHONE ENCOUNTER
Called patient and informed her the chromogranin A level was within normal limits and she can repeat it in 6 months

## 2021-06-28 DIAGNOSIS — E78.00 PURE HYPERCHOLESTEROLEMIA: ICD-10-CM

## 2021-06-28 RX ORDER — ROSUVASTATIN CALCIUM 20 MG/1
20 TABLET, COATED ORAL
Qty: 90 TABLET | Refills: 3 | Status: SHIPPED | OUTPATIENT
Start: 2021-06-28 | End: 2021-08-10 | Stop reason: SDUPTHER

## 2021-08-10 ENCOUNTER — OFFICE VISIT (OUTPATIENT)
Dept: INTERNAL MEDICINE CLINIC | Facility: CLINIC | Age: 71
End: 2021-08-10
Payer: COMMERCIAL

## 2021-08-10 VITALS
TEMPERATURE: 99.4 F | HEART RATE: 76 BPM | WEIGHT: 212.6 LBS | DIASTOLIC BLOOD PRESSURE: 100 MMHG | BODY MASS INDEX: 32.22 KG/M2 | OXYGEN SATURATION: 98 % | RESPIRATION RATE: 14 BRPM | HEIGHT: 68 IN | SYSTOLIC BLOOD PRESSURE: 180 MMHG

## 2021-08-10 DIAGNOSIS — E27.8 ADRENAL NODULE (HCC): ICD-10-CM

## 2021-08-10 DIAGNOSIS — E55.9 VITAMIN D DEFICIENCY: ICD-10-CM

## 2021-08-10 DIAGNOSIS — E78.00 PURE HYPERCHOLESTEROLEMIA: ICD-10-CM

## 2021-08-10 DIAGNOSIS — E66.09 CLASS 1 OBESITY DUE TO EXCESS CALORIES WITHOUT SERIOUS COMORBIDITY WITH BODY MASS INDEX (BMI) OF 32.0 TO 32.9 IN ADULT: ICD-10-CM

## 2021-08-10 DIAGNOSIS — C7A.00 CARCINOID TUMOR METASTATIC TO INTRA-ABDOMINAL LYMPH NODE (HCC): ICD-10-CM

## 2021-08-10 DIAGNOSIS — N18.31 STAGE 3A CHRONIC KIDNEY DISEASE (HCC): ICD-10-CM

## 2021-08-10 DIAGNOSIS — I10 ESSENTIAL HYPERTENSION: ICD-10-CM

## 2021-08-10 DIAGNOSIS — C20 RECTAL CANCER (HCC): ICD-10-CM

## 2021-08-10 DIAGNOSIS — F41.8 ANXIETY ABOUT HEALTH: ICD-10-CM

## 2021-08-10 DIAGNOSIS — Z00.00 MEDICARE WELCOME VISIT: Primary | ICD-10-CM

## 2021-08-10 DIAGNOSIS — J32.9 SINUSITIS, UNSPECIFIED CHRONICITY, UNSPECIFIED LOCATION: ICD-10-CM

## 2021-08-10 DIAGNOSIS — C7B.09 CARCINOID TUMOR METASTATIC TO INTRA-ABDOMINAL LYMPH NODE (HCC): ICD-10-CM

## 2021-08-10 PROBLEM — Z86.16 PERSONAL HISTORY OF COVID-19: Status: ACTIVE | Noted: 2021-08-10

## 2021-08-10 PROBLEM — I16.0 HYPERTENSIVE URGENCY: Status: RESOLVED | Noted: 2020-02-24 | Resolved: 2021-08-10

## 2021-08-10 PROCEDURE — G0402 INITIAL PREVENTIVE EXAM: HCPCS | Performed by: NURSE PRACTITIONER

## 2021-08-10 PROCEDURE — 3288F FALL RISK ASSESSMENT DOCD: CPT | Performed by: NURSE PRACTITIONER

## 2021-08-10 PROCEDURE — 3008F BODY MASS INDEX DOCD: CPT | Performed by: SURGERY

## 2021-08-10 PROCEDURE — 99214 OFFICE O/P EST MOD 30 MIN: CPT | Performed by: NURSE PRACTITIONER

## 2021-08-10 RX ORDER — HYDRALAZINE HYDROCHLORIDE 50 MG/1
50 TABLET, FILM COATED ORAL 3 TIMES DAILY
Qty: 90 TABLET | Refills: 3
Start: 2021-08-10 | End: 2021-10-14 | Stop reason: SDUPTHER

## 2021-08-10 RX ORDER — ALPRAZOLAM 0.25 MG/1
0.25 TABLET ORAL
Qty: 60 TABLET | Refills: 0 | Status: SHIPPED | OUTPATIENT
Start: 2021-08-10

## 2021-08-10 RX ORDER — METOPROLOL TARTRATE 100 MG/1
TABLET ORAL
Qty: 180 TABLET | Refills: 3 | Status: SHIPPED | OUTPATIENT
Start: 2021-08-10 | End: 2021-08-10 | Stop reason: SDUPTHER

## 2021-08-10 RX ORDER — METOPROLOL TARTRATE 100 MG/1
TABLET ORAL
Qty: 180 TABLET | Refills: 3
Start: 2021-08-10 | End: 2021-10-14 | Stop reason: SDUPTHER

## 2021-08-10 RX ORDER — FLUTICASONE PROPIONATE 50 MCG
1 SPRAY, SUSPENSION (ML) NASAL DAILY
Start: 2021-08-10

## 2021-08-10 RX ORDER — ACETAMINOPHEN 160 MG
2000 TABLET,DISINTEGRATING ORAL EVERY MORNING
Start: 2021-08-10

## 2021-08-10 RX ORDER — ROSUVASTATIN CALCIUM 20 MG/1
20 TABLET, COATED ORAL
Qty: 90 TABLET | Refills: 3
Start: 2021-08-10

## 2021-08-10 RX ORDER — LISINOPRIL 20 MG/1
20 TABLET ORAL 2 TIMES DAILY
Qty: 180 TABLET | Refills: 3
Start: 2021-08-10 | End: 2021-10-14 | Stop reason: SDUPTHER

## 2021-08-10 NOTE — ASSESSMENT & PLAN NOTE
Lab Results   Component Value Date    EGFR 53 06/17/2021    EGFR 55 02/24/2021    EGFR 51 09/17/2020    CREATININE 1 08 06/17/2021    CREATININE 1 04 02/24/2021    CREATININE 1 11 09/17/2020   · stable  · Avoid nephrotoxic medications

## 2021-08-10 NOTE — ASSESSMENT & PLAN NOTE
· Patient under significant personal stress due to her cancer diagnoses  · Her  is also having health issues  · And her son is continuing to have significant heart issues after having covid    · Will start xanax

## 2021-08-10 NOTE — ASSESSMENT & PLAN NOTE
· Lifestyle modification, diet and exercise discussed  · Medications discussed  · Labs discussed  · Hepatitis C screening discussed  · Depression screening performed  · BMI discussed  · Fall risk discussed  · Mammogram ordered

## 2021-08-10 NOTE — ASSESSMENT & PLAN NOTE
· 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  A CT demonstrated a small bowel mass and she had a small bowel resection  · Surgical oncology of [place]  · 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  She underwent a small-bowel resection with mesenteric nodes with Dr Bridgett Pinto  She had 4/13 positive lymph nodes  This was T3 N1 M0 G2  Adjuvant medical therapy was not recommended  She is currently on observation  She has no new complaints today there are no concerns on today's exam  Her chromogranin a level is normal  She had a CT of her abdomen and pelvis which revealed relatively stable benign and postoperative changes  There is a borderline 0 9 x 0 6 lymph node in the right lower quadrant which warrants continued follow-up and there are additional indeterminate mesenteric nodules which be followed as well  I will plan to repeat her CT scan in 6 months to ensure stability  She was also found to have a thickened endometrium on her most recent CT scan  Denies vaginal bleeding  Patient states she does not currently follow with a gynecologist and hasn't for quite some time  I will refer her to Gynecology and I will also order a pelvic ultrasound so this can be performed prior to her consultation with them  We will plan to see her back in 6 months with a scan and labs or sooner should the need arise  She was instructed to call with any new concerns or symptoms prior to that time  All of her questions were answered today  · 6/15/2021: surgical oncology: neuroendocrine tumor  She is doing well at this time with no complaints  She denies any abdominal pain, nausea or vomiting  No flushing, diarrhea, palpitations, headaches, or sweats  CT from May 25, 2021 reveals no obvious recurrence  There were subcentimeter lymph nodes in the right lower quadrant that are stable  There was some thickening near the anastomosis  It is unclear if this is just postsurgical or recurrent tumor  Repeat imaging was recommended in 3 months  I personally reviewed the films

## 2021-08-10 NOTE — ASSESSMENT & PLAN NOTE
· Has white coat syndrome  · Continue hydralazine, lisinopril, metoprolol  · Home blood pressure readings range from 113/70 to 140/80    · HR stable  · Patient is under significant stressors

## 2021-08-10 NOTE — PROGRESS NOTES
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)       Genitourinary    Stage 3 chronic kidney disease ([facility] )     Lab Results   Component Value Date    EGFR 53 06/17/2021    EGFR 55 02/24/2021    EGFR 51 09/17/2020    CREATININE 1 08 06/17/2021    CREATININE 1 04 02/24/2021    CREATININE 1 11 09/17/2020 ·   stable  · Avoid nephrotoxic medications             Other    Pure hypercholesterolemia     · Continue crestor          Relevant Medications    rosuvastatin (CRESTOR) 20 MG tablet    Adrenal nodule ([facility] )    Medicare welcome visit - Primary     · Lifestyle modification, diet and exercise discussed  · Medications discussed  · Labs discussed  · Hepatitis C screening discussed  · Depression screening performed  · BMI discussed  · Fall risk discussed  · Mammogram ordered         Anxiety about health     · Patient under significant personal stress due to her cancer diagnoses  · Her  is also having health issues  · And her son is continuing to have significant heart issues after having covid    · Will start xanax         Relevant Medications    ALPRAZolam (XANAX) 0 25 mg tablet    Vitamin D deficiency     · Continue supplementation         Relevant Medications    co-enzyme Q-10 50 MG capsule    Cholecalciferol (Vitamin D3) 50 MCG (2000 UT) capsule    Class 1 obesity due to excess calories without serious comorbidity with body mass index (BMI) of 32 0 to 32 9 in adult · Lifestyle modification, diet and exercise discussed            Other Visit Diagnoses     Sinusitis, unspecified chronicity, unspecified location        Relevant Medications    fluticasone (FLONASE) 50 mcg/act nasal spray         Diagnoses and all orders for this visit:    Medicare welcome visit    Adrenal nodule ([facility] )    Essential hypertension  -     Discontinue: metoprolol tartrate (LOPRESSOR) 100 mg tablet; Take 1 tablet by mouth twice daily  -     hydrALAZINE (APRESOLINE) 50 mg tablet; Take 1 tablet (50 mg total) by mouth 3 (three) times a day  -     lisinopril (ZESTRIL) 20 mg tablet; Take 1 tablet (20 mg total) by mouth 2 (two) times a day  -     metoprolol tartrate (LOPRESSOR) 100 mg tablet; Take 1 tablet by mouth twice daily    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)    Rectal cancer (HCC)    Stage 3a chronic kidney disease ([facility] )    Anxiety about health  -     ALPRAZolam (XANAX) 0 25 mg tablet; Take 1 tablet (0 25 mg total) by mouth daily at bedtime as needed for anxiety    Pure hypercholesterolemia  -     rosuvastatin (CRESTOR) 20 MG tablet; Take 1 tablet (20 mg total) by mouth every other day    Vitamin D deficiency  -     co-enzyme Q-10 50 MG capsule; Take 1 capsule (50 mg total) by mouth every other day  -     Cholecalciferol (Vitamin D3) 50 MCG (2000 UT) capsule;  Take 1 capsule (2,000 Units total) by mouth every morning    Class 1 obesity due to excess calories without serious comorbidity with body mass index (BMI) of 32 0 to 32 9 in adult    Sinusitis, unspecified chronicity, unspecified location  -     fluticasone (FLONASE) 50 mcg/act nasal spray; 1 spray into each nostril daily     Medicare welcome visit  · Lifestyle modification, diet and exercise discussed  · Medications discussed  · Labs discussed  · Hepatitis C screening discussed  · Depression screening performed  · BMI discussed  · Fall risk discussed  · Mammogram ordered    History of malignant neuroendocrine tumor  · 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  A CT demonstrated a small bowel mass and she had a small bowel resection  · [facility] [place]  · 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  She underwent a small-bowel resection with mesenteric nodes with Dr Zarate Flick  She had 4/13 positive lymph nodes  This was T3 N1 M0 G2  Adjuvant medical therapy was not recommended  She is currently on observation  She has no new complaints today there are no concerns on today's exam  Her chromogranin a level is normal  She had a CT of her abdomen and pelvis which revealed relatively stable benign and postoperative changes  There is a borderline 0 9 x 0 6 lymph node in the right lower quadrant which warrants continued follow-up and there are additional indeterminate mesenteric nodules which be followed as well  I will plan to repeat her CT scan in 6 months to ensure stability  She was also found to have a thickened endometrium on her most recent CT scan  Denies vaginal bleeding  Patient states she does not currently follow with a gynecologist and hasn't for quite some time  I will refer her to Gynecology and I will also order a pelvic ultrasound so this can be performed prior to her consultation with them  We will plan to see her back in 6 months with a scan and labs or sooner should the need arise  She was instructed to call with any new concerns or symptoms prior to that time  All of her questions were answered today  · 6/15/2021: surgical oncology: neuroendocrine tumor  She is doing well at this time with no complaints  She denies any abdominal pain, nausea or vomiting  No flushing, diarrhea, palpitations, headaches, or sweats  CT from May 25, 2021 reveals no obvious recurrence  There were subcentimeter lymph nodes in the right lower quadrant that are stable  There was some thickening near the anastomosis  It is unclear if this is just postsurgical or recurrent tumor  Repeat imaging was recommended in 3 months  I personally reviewed the films  Essential hypertension  · Has white coat syndrome  · Continue hydralazine, lisinopril, metoprolol  · Home blood pressure readings range from 113/70 to 140/80  · HR stable  · Patient is under significant stressors    Stage 3 chronic kidney disease ([address] )  Lab Results   Component Value Date    EGFR 53 06/17/2021    EGFR 55 02/24/2021    EGFR 51 09/17/2020    CREATININE 1 08 06/17/2021    CREATININE 1 04 02/24/2021    CREATININE 1 11 09/17/2020   · stable  · Avoid nephrotoxic medications     Pure hypercholesterolemia  · Continue crestor     Vitamin D deficiency  · Continue supplementation    Class 1 obesity due to excess calories without serious comorbidity with body mass index (BMI) of 32 0 to 32 9 in adult  · Lifestyle modification, diet and exercise discussed     Anxiety about health  · Patient under significant personal stress due to her cancer diagnoses  · Her  is also having health issues  · And her son is continuing to have significant heart issues after having covid  · Will start xanax      Subjective:      Patient ID: Henrik Cast is a 80 y o  female  The patient is here to establish care  She has a significant history and has been having increased stress and anxiety related to her health, her husbands health, and her son's health  The following portions of the patient's history were reviewed and updated as appropriate:   Past Medical History:  She has a past medical history of Benign essential hypertension, Colon polyps, Essential hypertension, Hematuria syndrome, Osteoarthritis, Proteinuria, and Vitamin D deficiency  ,  _______________________________________________________________________  Medical Problems:  does not have any pertinent problems on file ,  _______________________________________________________________________  Past Surgical History:   has a past surgical history that includes Rectal biopsy (N/A, 2/9/2018); Colonoscopy (12/2018); Colonoscopy; Breast biopsy (Right, 2017); pr lap,diagnostic abdomen (N/A, 3/24/2020); Small intestine surgery (N/A, 4/29/2020); Mammo stereotactic breast biopsy right (all inc) (Right, 6/18/2020); and Mammo stereotactic breast biopsy left (all inc) (Left, 6/18/2020)  ,  _______________________________________________________________________  Family History:  family history includes Heart disease in her father; Hypertension in her mother; No Known Problems in her daughter, maternal aunt, maternal grandfather, maternal grandmother, paternal aunt, paternal aunt, paternal grandfather, and paternal grandmother; Pancreatic cancer in her brother ,  _______________________________________________________________________  Social History:   reports that she quit smoking about 36 years ago  She has never used smokeless tobacco  She reports previous alcohol use  She reports that she does not use drugs  ,  _______________________________________________________________________  Allergies:  has No Known Allergies     _______________________________________________________________________  Current Outpatient Medications   Medication Sig Dispense Refill    Cholecalciferol (Vitamin D3) 50 MCG (2000 UT) capsule Take 1 capsule (2,000 Units total) by mouth every morning      co-enzyme Q-10 50 MG capsule Take 1 capsule (50 mg total) by mouth every other day      fluticasone (FLONASE) 50 mcg/act nasal spray 1 spray into each nostril daily      hydrALAZINE (APRESOLINE) 50 mg tablet Take 1 tablet (50 mg total) by mouth 3 (three) times a day 90 tablet 3    lisinopril (ZESTRIL) 20 mg tablet Take 1 tablet (20 mg total) by mouth 2 (two) times a day 180 tablet 3    metoprolol tartrate (LOPRESSOR) 100 mg tablet Take 1 tablet by mouth twice daily 180 tablet 3    rosuvastatin (CRESTOR) 20 MG tablet Take 1 tablet (20 mg total) by mouth every other day 90 tablet 3    ALPRAZolam (XANAX) 0 25 mg tablet Take 1 tablet (0 25 mg total) by mouth daily at bedtime as needed for anxiety 60 tablet 0     Current Facility-Administered Medications   Medication Dose Route Frequency Provider Last Rate Last Admin    cephalexin (KEFLEX) capsule 500 mg  500 mg Oral Q6H Kyle Landaverde MD         _______________________________________________________________________  Review of Systems   Constitutional: Negative for activity change, chills, fatigue and fever  HENT: Negative for rhinorrhea and sore throat  Eyes: Negative for pain  Respiratory: Negative for cough and shortness of breath  Cardiovascular: Negative for chest pain, palpitations and leg swelling  Gastrointestinal: Negative for abdominal pain, constipation, diarrhea, nausea and vomiting  Genitourinary: Negative for difficulty urinating, flank pain, frequency and urgency  Musculoskeletal: Negative for gait problem, joint swelling and myalgias  Skin: Negative for color change  Neurological: Negative for dizziness, weakness, light-headedness and headaches  Psychiatric/Behavioral: Negative for sleep disturbance  The patient is nervous/anxious  All other systems reviewed and are negative  Objective:  Vitals:    08/10/21 1219   BP: (!) 180/100   BP Location: Left arm   Patient Position: Sitting   Cuff Size: Standard   Pulse: 76   Resp: 14   Temp: 99 4 °F (37 4 °C)   SpO2: 98%   Weight: 96 4 kg (212 lb 9 6 oz)   Height: 5' 8" (1 727 m)     Body mass index is 32 33 kg/m²  Physical Exam  Vitals reviewed  Constitutional:       General: She is awake  Appearance: Normal appearance  She is well-developed  HENT:      Head: Normocephalic and atraumatic  Nose: Nose normal       Mouth/Throat:      Mouth: Mucous membranes are moist    Eyes:      Extraocular Movements: Extraocular movements intact  Cardiovascular:      Rate and Rhythm: Normal rate and regular rhythm  Pulses: Normal pulses  Heart sounds: Normal heart sounds  Pulmonary:      Effort: Pulmonary effort is normal       Breath sounds: Normal breath sounds  Abdominal:      General: Bowel sounds are normal       Palpations: Abdomen is soft  Musculoskeletal:         General: Normal range of motion  Cervical back: Normal range of motion  Right lower leg: No edema  Left lower leg: No edema  Skin:     General: Skin is warm and dry  Neurological:      Mental Status: She is alert and oriented to person, place, and time  Psychiatric:         Attention and Perception: Attention normal          Mood and Affect: Mood normal          Speech: Speech normal          Behavior: Behavior normal  Behavior is cooperative  PHQ-9 Depression Screening    PHQ-9:   Frequency of the following problems over the past two weeks:      Little interest or pleasure in doing things: 0 - not at all  Feeling down, depressed, or hopeless: 0 - not at all  PHQ-2 Score: 0          Assessment and Plan:     Problem List Items Addressed This Visit        Digestive    Rectal cancer [facility])       Cardiovascular and Mediastinum    Essential hypertension     · Has white coat syndrome  · Continue hydralazine, lisinopril, metoprolol  · Home blood pressure readings range from 113/70 to 140/80    · HR stable  · Patient is under significant stressors         Relevant Medications    hydrALAZINE (APRESOLINE) 50 mg tablet    lisinopril (ZESTRIL) 20 mg tablet    metoprolol tartrate (LOPRESSOR) 100 mg tablet       Immune and Lymphatic    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)       Genitourinary    Stage 3 chronic [facility]     Lab Results   Component Value Date    EGFR 53 06/17/2021    EGFR 55 02/24/2021    EGFR 51 09/17/2020    CREATININE 1 08 06/17/2021    CREATININE 1 04 02/24/2021    CREATININE 1 11 09/17/2020 ·   stable  · Avoid nephrotoxic medications             Other    Pure hypercholesterolemia     · Continue crestor          Relevant Medications rosuvastatin (CRESTOR) 20 MG tablet    Adrenal nodule ([facility] )    Medicare welcome visit - Primary     · Lifestyle modification, diet and exercise discussed  · Medications discussed  · Labs discussed  · Hepatitis C screening discussed  · Depression screening performed  · BMI discussed  · Fall risk discussed  · Mammogram ordered         Anxiety about health     · Patient under significant personal stress due to her cancer diagnoses  · Her  is also having health issues  · And her son is continuing to have significant heart issues after having covid  · Will start xanax         Relevant Medications    ALPRAZolam (XANAX) 0 25 mg tablet    Vitamin D deficiency     · Continue supplementation         Relevant Medications    co-enzyme Q-10 50 MG capsule    Cholecalciferol (Vitamin D3) 50 MCG (2000 UT) capsule    Class 1 obesity due to excess calories without serious comorbidity with body mass index (BMI) of 32 0 to 32 9 in adult     · Lifestyle modification, diet and exercise discussed            Other Visit Diagnoses     Sinusitis, unspecified chronicity, unspecified location        Relevant Medications    fluticasone (FLONASE) 50 mcg/act nasal spray           Preventive health issues were discussed with patient, and age appropriate screening tests were ordered as noted in patient's After Visit Summary  Personalized health advice and appropriate referrals for health education or preventive services given if needed, as noted in patient's After Visit Summary       History of Present Illness:     Patient presents for Medicare Annual Wellness visit    Patient Care Team:  Rubia Salgado as PCP - General (Internal Medicine)  Ginny Barreto MD (Oncology)  Inessa Hartman MD (General Surgery)  Hannah Gaona MD (Nephrology)     Problem List:     Patient Active Problem List   Diagnosis    [facility] )    Colitis    Pure hypercholesterolemia    Encounter for administration of vaccine    Essential hypertension    Stage 3 chronic kidney disease (HCC)    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)    History of malignant neuroendocrine tumor    Adrenal nodule ([address] )    Encounter for follow-up examination after completed treatment for malignant neoplasm    Thickened endometrium    Medicare welcome visit    Anxiety about health    Vitamin D deficiency    Class 1 obesity due to excess calories without serious comorbidity with body mass index (BMI) of 32 0 to 32 9 in adult    Personal history of COVID-19      Past Medical and Surgical History:     Past Medical History:   Diagnosis Date    Benign essential hypertension     Colon polyps     Essential hypertension     Hematuria syndrome     Osteoarthritis     Proteinuria     Vitamin D deficiency      Past Surgical History:   Procedure Laterality Date    BREAST BIOPSY Right 2017    benign    COLONOSCOPY  12/2018    3 polyps     COLONOSCOPY      several    MAMMO STEREOTACTIC BREAST BIOPSY LEFT (ALL INC) Left 6/18/2020    MAMMO STEREOTACTIC BREAST BIOPSY RIGHT (ALL INC) Right 6/18/2020    VA LAP,DIAGNOSTIC ABDOMEN N/A 3/24/2020    Procedure: 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;  Surgeon: Rakesh Batista MD;  Location: [facility] MAIN OR;  Service: General    RECTAL BIOPSY N/A 2/9/2018    Procedure: TRANSANAL EXCISION RECTAL POLYP;MUCOSAL POLYPECTOMY;  Surgeon: Norman Concepcion MD;  Location: [facility] MAIN OR;  Service: Colorectal    SMALL INTESTINE SURGERY N/A 4/29/2020    Procedure: RESECTION SMALL BOWEL;  Surgeon: Kendrick Razo MD;  Location: [facility] MAIN OR;  Service: Surgical Oncology      Family History:     Family History   Problem Relation Age of Onset    Hypertension Mother     Heart disease Father     No Known Problems Daughter     No Known Problems Maternal Grandmother     No Known Problems Maternal Grandfather     No Known Problems Paternal Grandmother     No Known Problems Paternal Grandfather     Pancreatic cancer Brother     No Known Problems Maternal Aunt     No Known Problems Paternal Aunt     No Known Problems Paternal Aunt       Social History:     Social History     Socioeconomic History    Marital status: /Civil Union     Spouse name: None    Number of children: None    Years of education: None    Highest education level: None   Occupational History    None   Tobacco Use    Smoking status: Former Smoker     Quit date:      Years since quittin 6    Smokeless tobacco: Never Used    Tobacco comment: 27  YEARS AGO    Vaping Use    Vaping Use: Never used   Substance and Sexual Activity    Alcohol use: Not Currently     Comment: occasional    Drug use: Never    Sexual activity: None   Other Topics Concern    None   Social History Narrative    None     Social Determinants of Health     Financial Resource Strain:     Difficulty of Paying Living Expenses:    Food Insecurity:     Worried About Running Out of Food in the Last Year:     Ran Out of Food in the Last Year:    Transportation Needs:     Lack of Transportation (Medical):      Lack of Transportation (Non-Medical):    Physical Activity:     Days of Exercise per Week:     Minutes of Exercise per Session:    Stress:     Feeling of Stress :    Social Connections:     Frequency of Communication with Friends and Family:     Frequency of Social Gatherings with Friends and Family:     Attends [employer] Services:     Active Member of Clubs or Organizations:     Attends Club or Organization Meetings:     Marital Status:    Intimate Partner Violence:     Fear of Current or Ex-Partner:     Emotionally Abused:     Physically Abused:     Sexually Abused:       Medications and Allergies:     Current Outpatient Medications   Medication Sig Dispense Refill    Cholecalciferol (Vitamin D3) 50 MCG ( UT) capsule Take 1 capsule (2,000 Units total) by mouth every morning      co-enzyme Q-10 50 MG capsule Take 1 capsule (50 mg total) by mouth every other day      fluticasone (FLONASE) 50 mcg/act nasal spray 1 spray into each nostril daily      hydrALAZINE (APRESOLINE) 50 mg tablet Take 1 tablet (50 mg total) by mouth 3 (three) times a day 90 tablet 3    lisinopril (ZESTRIL) 20 mg tablet Take 1 tablet (20 mg total) by mouth 2 (two) times a day 180 tablet 3    metoprolol tartrate (LOPRESSOR) 100 mg tablet Take 1 tablet by mouth twice daily 180 tablet 3    rosuvastatin (CRESTOR) 20 MG tablet Take 1 tablet (20 mg total) by mouth every other day 90 tablet 3    ALPRAZolam (XANAX) 0 25 mg tablet Take 1 tablet (0 25 mg total) by mouth daily at bedtime as needed for anxiety 60 tablet 0     Current Facility-Administered Medications   Medication Dose Route Frequency Provider Last Rate Last Admin    cephalexin (KEFLEX) capsule 500 mg  500 mg Oral Q6H [address] Jose Alberto Rollins MD         No Known Allergies   Immunizations:     Immunization History   Administered Date(s) Administered    INFLUENZA 09/25/2015, 10/26/2017, 10/29/2018    Influenza, high dose seasonal 0 7 mL 10/21/2019    Pneumococcal Conjugate 13-Valent 05/10/2018    Sars-cov-2 / Covid-19 vector-nr, [id number] vaccine Shayna Eng / Sada Langford [employer]) 04/06/2021      Health Maintenance:         Topic Date Due    Hepatitis C Screening  Never done    Breast Cancer Screening: Mammogram  06/12/2021         Topic Date Due    DTaP,Tdap,and Td Vaccines (1 - Tdap) Never done    Pneumococcal Vaccine: 65+ Years (2 of 4 - PPSV23) 07/05/2018    Influenza Vaccine (1) 09/01/2021      Medicare Health Risk Assessment:     BP (!) 180/100 (BP Location: Left arm, Patient Position: Sitting, Cuff Size: Standard)   Pulse 76   Temp 99 4 °F (37 4 °C)   Resp 14   Ht 5' 8" (1 727 m)   Wt 96 4 kg (212 lb 9 6 oz)   SpO2 98%   BMI 32 33 kg/m²      Felipe Hence is here for her Subsequent Wellness visit  Health Risk Assessment:   Patient rates overall health as good   Patient feels that their physical health rating is same  Patient is satisfied with their life  Eyesight was rated as same  Hearing was rated as same  Patient feels that their emotional and mental health rating is same  Patients states they are never, rarely angry  Patient states they are sometimes unusually tired/fatigued  Pain experienced in the last 7 days has been some  Patient's pain rating has been 7/10  Patient states that she has experienced weight loss or gain in last 6 months  Depression Screening:   PHQ-2 Score: 0      Fall Risk Screening: In the past year, patient has experienced: no history of falling in past year      Urinary Incontinence Screening:   Patient has not leaked urine accidently in the last six months  Home Safety:  Patient does not have trouble with stairs inside or outside of their home  Patient has working smoke alarms and has working carbon monoxide detector  Home safety hazards include: loose rugs on the floor  Nutrition:   Current diet is Regular and Frequent junk food  Medications:   Patient is currently taking over-the-counter supplements  OTC medications include: see medication list  Patient is able to manage medications  Activities of Daily Living (ADLs)/Instrumental Activities of Daily Living (IADLs):   Walk and transfer into and out of bed and chair?: Yes  Dress and groom yourself?: Yes    Bathe or shower yourself?: Yes    Feed yourself? Yes  Do your laundry/housekeeping?: Yes  Manage your money, pay your bills and track your expenses?: Yes  Make your own meals?: Yes    Do your own shopping?: Yes    Previous Hospitalizations:   Any hospitalizations or ED visits within the last 12 months?: No      Advance Care Planning:   Living will: No    Durable POA for healthcare:  Yes    Advanced directive: Yes    Five wishes given: Yes      Cognitive Screening:   Provider or family/friend/caregiver concerned regarding cognition?: No    PREVENTIVE SCREENINGS      Cardiovascular Screening: General: Screening Not Indicated and History Lipid Disorder      Diabetes Screening:     General: Screening Current      Colorectal Cancer Screening:     General: History Colorectal Cancer      Breast Cancer Screening:     General: Screening Current      Cervical Cancer Screening:    General: Screening Not Indicated      Lung Cancer Screening:     General: Screening Not Indicated    Screening, Brief Intervention, and Referral to Treatment (SBIRT)    Screening  Typical number of drinks in a day: 0  Typical number of drinks in a week: 1  Interpretation: Low risk drinking behavior      Single Item Drug Screening:  How often have you used an illegal drug (including marijuana) or a prescription medication for non-medical reasons in the past year? never    Single Item Drug Screen Score: 0  Interpretation: Negative screen for possible drug use disorder      JAHAIRA Saldaña

## 2021-08-10 NOTE — PATIENT INSTRUCTIONS
Medicare Preventive Visit Patient Instructions  Thank you for completing your Welcome to Medicare Visit or Medicare Annual Wellness Visit today  Your next wellness visit will be due in one year (8/11/2022)  The screening/preventive services that you may require over the next 5-10 years are detailed below  Some tests may not apply to you based off risk factors and/or age  Screening tests ordered at today's visit but not completed yet may show as past due  Also, please note that scanned in results may not display below  Preventive Screenings:  Service Recommendations Previous Testing/Comments   Colorectal Cancer Screening  * Colonoscopy    * Fecal Occult Blood Test (FOBT)/Fecal Immunochemical Test (FIT)  * Fecal DNA/Cologuard Test  * Flexible Sigmoidoscopy Age: 54-65 years old   Colonoscopy: every 10 years (may be performed more frequently if at higher risk)  OR  FOBT/FIT: every 1 year  OR  Cologuard: every 3 years  OR  Sigmoidoscopy: every 5 years  Screening may be recommended earlier than age 48 if at higher risk for colorectal cancer  Also, an individualized decision between you and your healthcare provider will decide whether screening between the ages of 74-80 would be appropriate  Colonoscopy: 01/07/2020  FOBT/FIT: Not on file  Cologuard: Not on file  Sigmoidoscopy: Not on file    History Colorectal Cancer     Breast Cancer Screening Age: 36 years old  Frequency: every 1-2 years  Not required if history of left and right mastectomy Mammogram: 06/12/2020    Screening Current   Cervical Cancer Screening Between the ages of 21-29, pap smear recommended once every 3 years  Between the ages of 33-67, can perform pap smear with HPV co-testing every 5 years     Recommendations may differ for women with a history of total hysterectomy, cervical cancer, or abnormal pap smears in past  Pap Smear: 12/08/2020    Screening Not Indicated   Hepatitis C Screening Once for adults born between 1945 and 1965  More frequently in patients at high risk for Hepatitis C Hep C Antibody: Not on file        Diabetes Screening 1-2 times per year if you're at risk for diabetes or have pre-diabetes Fasting glucose: 95 mg/dL   A1C: 5 5 %    Screening Current   Cholesterol Screening Once every 5 years if you don't have a lipid disorder  May order more often based on risk factors  Lipid panel: 02/24/2021    Screening Not Indicated  History Lipid Disorder     Other Preventive Screenings Covered by Medicare:  1  Abdominal Aortic Aneurysm (AAA) Screening: covered once if your at risk  You're considered to be at risk if you have a family history of AAA  2  Lung Cancer Screening: covers low dose CT scan once per year if you meet all of the following conditions: (1) Age 50-69; (2) No signs or symptoms of lung cancer; (3) Current smoker or have quit smoking within the last 15 years; (4) You have a tobacco smoking history of at least 30 pack years (packs per day multiplied by number of years you smoked); (5) You get a written order from a healthcare provider  3  Glaucoma Screening: covered annually if you're considered high risk: (1) You have diabetes OR (2) Family history of glaucoma OR (3)  aged 48 and older OR (3)  American aged 72 and older  3  Osteoporosis Screening: covered every 2 years if you meet one of the following conditions: (1) You're estrogen deficient and at risk for osteoporosis based off medical history and other findings; (2) Have a vertebral abnormality; (3) On glucocorticoid therapy for more than 3 months; (4) Have primary hyperparathyroidism; (5) On osteoporosis medications and need to assess response to drug therapy  · Last bone density test (DXA Scan): Not on file  5  HIV Screening: covered annually if you're between the age of 12-76  Also covered annually if you are younger than 13 and older than 72 with risk factors for HIV infection   For pregnant patients, it is covered up to 3 times per pregnancy  Immunizations:  Immunization Recommendations   Influenza Vaccine Annual influenza vaccination during flu season is recommended for all persons aged >= 6 months who do not have contraindications   Pneumococcal Vaccine (Prevnar and Pneumovax)  * Prevnar = PCV13  * Pneumovax = PPSV23   Adults 25-60 years old: 1-3 doses may be recommended based on certain risk factors  Adults 72 years old: Prevnar (PCV13) vaccine recommended followed by Pneumovax (PPSV23) vaccine  If already received PPSV23 since turning 65, then PCV13 recommended at least one year after PPSV23 dose  Hepatitis B Vaccine 3 dose series if at intermediate or high risk (ex: diabetes, end stage renal disease, liver disease)   Tetanus (Td) Vaccine - COST NOT COVERED BY MEDICARE PART B Following completion of primary series, a booster dose should be given every 10 years to maintain immunity against tetanus  Td may also be given as tetanus wound prophylaxis  Tdap Vaccine - COST NOT COVERED BY MEDICARE PART B Recommended at least once for all adults  For pregnant patients, recommended with each pregnancy  Shingles Vaccine (Shingrix) - COST NOT COVERED BY MEDICARE PART B  2 shot series recommended in those aged 48 and above     Health Maintenance Due:      Topic Date Due    Hepatitis C Screening  Never done    Breast Cancer Screening: Mammogram  06/12/2021     Immunizations Due:      Topic Date Due    DTaP,Tdap,and Td Vaccines (1 - Tdap) Never done    Pneumococcal Vaccine: 65+ Years (2 of 4 - PPSV23) 07/05/2018    Influenza Vaccine (1) 09/01/2021     Advance Directives   What are advance directives? Advance directives are legal documents that state your wishes and plans for medical care  These plans are made ahead of time in case you lose your ability to make decisions for yourself  Advance directives can apply to any medical decision, such as the treatments you want, and if you want to donate organs     What are the types of advance directives? There are many types of advance directives, and each state has rules about how to use them  You may choose a combination of any of the following:  · Living will: This is a written record of the treatment you want  You can also choose which treatments you do not want, which to limit, and which to stop at a certain time  This includes surgery, medicine, IV fluid, and tube feedings  · Durable power of  for healthcare [facility]): This is a written record that states who you want to make healthcare choices for you when you are unable to make them for yourself  This person, called a proxy, is usually a family member or a friend  You may choose more than 1 proxy  · Do not resuscitate (DNR) order:  A DNR order is used in case your heart stops beating or you stop breathing  It is a request not to have certain forms of treatment, such as CPR  A DNR order may be included in other types of advance directives  · Medical directive: This covers the care that you want if you are in a coma, near death, or unable to make decisions for yourself  You can list the treatments you want for each condition  Treatment may include pain medicine, surgery, blood transfusions, dialysis, IV or tube feedings, and a ventilator (breathing machine)  · Values history: This document has questions about your views, beliefs, and how you feel and think about life  This information can help others choose the care that you would choose  Why are advance directives important? An advance directive helps you control your care  Although spoken wishes may be used, it is better to have your wishes written down  Spoken wishes can be misunderstood, or not followed  Treatments may be given even if you do not want them  An advance directive may make it easier for your family to make difficult choices about your care     Weight Management   Why it is important to manage your weight:  Being overweight increases your risk of health conditions such as heart disease, high blood pressure, type 2 diabetes, and certain types of cancer  It can also increase your risk for osteoarthritis, sleep apnea, and other respiratory problems  Aim for a slow, steady weight loss  Even a small amount of weight loss can lower your risk of health problems  How to lose weight safely:  A safe and healthy way to lose weight is to eat fewer calories and get regular exercise  You can lose up about 1 pound a week by decreasing the number of calories you eat by 500 calories each day  Healthy meal plan for weight management:  A healthy meal plan includes a variety of foods, contains fewer calories, and helps you stay healthy  A healthy meal plan includes the following:  · Eat whole-grain foods more often  A healthy meal plan should contain fiber  Fiber is the part of grains, fruits, and vegetables that is not broken down by your body  Whole-grain foods are healthy and provide extra fiber in your diet  Some examples of whole-grain foods are whole-wheat breads and pastas, oatmeal, brown rice, and bulgur  · Eat a variety of vegetables every day  Include dark, leafy greens such as spinach, kale, khanh greens, and mustard greens  Eat yellow and orange vegetables such as carrots, sweet potatoes, and winter squash  · Eat a variety of fruits every day  Choose fresh or canned fruit (canned in its own juice or light syrup) instead of juice  Fruit juice has very little or no fiber  · Eat low-fat dairy foods  Drink fat-free (skim) milk or 1% milk  Eat fat-free yogurt and low-fat cottage cheese  Try low-fat cheeses such as mozzarella and other reduced-fat cheeses  · Choose meat and other protein foods that are low in fat  Choose beans or other legumes such as split peas or lentils  Choose fish, skinless poultry (chicken or turkey), or lean cuts of red meat (beef or pork)  Before you cook meat or poultry, cut off any visible fat  · Use less fat and oil    Try baking foods instead of frying them  Add less fat, such as margarine, sour cream, regular salad dressing and mayonnaise to foods  Eat fewer high-fat foods  Some examples of high-fat foods include french fries, doughnuts, ice cream, and cakes  · Eat fewer sweets  Limit foods and drinks that are high in sugar  This includes candy, cookies, regular soda, and sweetened drinks  Exercise:  Exercise at least 30 minutes per day on most days of the week  Some examples of exercise include walking, biking, dancing, and swimming  You can also fit in more physical activity by taking the stairs instead of the elevator or parking farther away from stores  Ask your healthcare provider about the best exercise plan for you  © Copyright [employer] 2018 Information is for End User's use only and may not be sold, redistributed or otherwise used for commercial purposes   All illustrations and images included in CareNotes® are the copyrighted property of A D A M , Inc  or [address] [place] [address]

## 2021-08-11 ENCOUNTER — HOSPITAL ENCOUNTER (OUTPATIENT)
Dept: ULTRASOUND IMAGING | Facility: HOSPITAL | Age: 71
Discharge: HOME | End: 2021-08-11
Payer: COMMERCIAL

## 2021-08-11 DIAGNOSIS — R93.89 ENDOMETRIAL THICKENING ON ULTRASOUND: ICD-10-CM

## 2021-08-11 PROCEDURE — 76830 TRANSVAGINAL US NON-OB: CPT

## 2021-08-11 PROCEDURE — 76856 US EXAM PELVIC COMPLETE: CPT

## 2021-08-16 ENCOUNTER — TELEPHONE (OUTPATIENT)
Dept: SURGICAL ONCOLOGY | Facility: CLINIC | Age: 71
End: 2021-08-16

## 2021-08-16 NOTE — TELEPHONE ENCOUNTER
Radiology called stating there are significant findings from the [address] that was done on 8/11/21

## 2021-08-17 ENCOUNTER — TELEPHONE (OUTPATIENT)
Dept: SURGICAL ONCOLOGY | Facility: CLINIC | Age: 71
End: 2021-08-17

## 2021-08-17 NOTE — TELEPHONE ENCOUNTER
Rescheduled patient for Tuesday 8/24/2021 at 10:00 am at the [facility] location, per patient request

## 2021-08-26 ENCOUNTER — OFFICE VISIT (OUTPATIENT)
Dept: NEPHROLOGY | Facility: CLINIC | Age: 71
End: 2021-08-26
Payer: COMMERCIAL

## 2021-08-26 VITALS
DIASTOLIC BLOOD PRESSURE: 102 MMHG | OXYGEN SATURATION: 98 % | HEART RATE: 75 BPM | BODY MASS INDEX: 31.98 KG/M2 | WEIGHT: 211 LBS | HEIGHT: 68 IN | SYSTOLIC BLOOD PRESSURE: 190 MMHG

## 2021-08-26 DIAGNOSIS — N18.31 STAGE 3A CHRONIC KIDNEY DISEASE (HCC): Primary | ICD-10-CM

## 2021-08-26 DIAGNOSIS — C7A.00 CARCINOID TUMOR METASTATIC TO INTRA-ABDOMINAL LYMPH NODE (HCC): ICD-10-CM

## 2021-08-26 DIAGNOSIS — E78.00 PURE HYPERCHOLESTEROLEMIA: ICD-10-CM

## 2021-08-26 DIAGNOSIS — I10 ESSENTIAL HYPERTENSION: ICD-10-CM

## 2021-08-26 DIAGNOSIS — C7B.09 CARCINOID TUMOR METASTATIC TO INTRA-ABDOMINAL LYMPH NODE (HCC): ICD-10-CM

## 2021-08-26 PROCEDURE — 1160F RVW MEDS BY RX/DR IN RCRD: CPT | Performed by: INTERNAL MEDICINE

## 2021-08-26 PROCEDURE — 99214 OFFICE O/P EST MOD 30 MIN: CPT | Performed by: INTERNAL MEDICINE

## 2021-08-26 NOTE — ASSESSMENT & PLAN NOTE
Lab Results   Component Value Date    EGFR 53 06/17/2021    EGFR 55 02/24/2021    EGFR 51 09/17/2020    CREATININE 1 08 06/17/2021    CREATININE 1 04 02/24/2021    CREATININE 1 11 09/17/2020   Baseline creatinine 0 9-1 1 mg/dl on basis of hypertensive nephrosclerosis  Her blood pressure has been controlled  She avoids NSAIDs

## 2021-08-26 NOTE — PROGRESS NOTES
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 [facility])     Following with Dr Luis Felipe Lay with periodic scans            Genitourinary    Stage 3 chronic kidney disease [facility]) - Primary     Lab Results   Component Value Date    EGFR 53 06/17/2021    EGFR 55 02/24/2021    EGFR 51 09/17/2020    CREATININE 1 08 06/17/2021    CREATININE 1 04 02/24/2021    CREATININE 1 11 09/17/2020   Baseline creatinine 0 9-1 1 mg/dl on basis of hypertensive nephrosclerosis  Her blood pressure has been controlled  She avoids NSAIDs            Other    Pure hypercholesterolemia     Aim for an LDL < 70                 Subjective:      Patient ID: Liliana Armenta is a 80 y o  female  HPI She is significant white coat hypertension with a BP of 190/102 in the office  Her BP pre-meds today was 139/81 and post med 129/75  We have compared our cuff with hers and it is accurate  She feels well with this le el of home blood pressure - no dizziness, lightheadedness or weakness    The following portions of the patient's history were reviewed and updated as appropriate: allergies, current medications, past family history, past medical history, past social history, past surgical history and problem list     Review of Systems   Constitutional: Negative for fatigue  HENT: Negative for hearing loss  Eyes: Negative for visual disturbance  Cardiovascular: Negative for chest pain, palpitations and leg swelling  Gastrointestinal: Negative for abdominal pain, blood in stool, constipation and diarrhea  Genitourinary: Negative for difficulty urinating, dysuria and frequency     Musculoskeletal: Positive for arthralgias  She has osteoarthritis of the right hip with leg pain   Neurological: Negative for dizziness, weakness and light-headedness  Hematological: Does not bruise/bleed easily  Psychiatric/Behavioral: Negative for dysphoric mood  Social History     Socioeconomic History    Marital status: /Civil Union     Spouse name: None    Number of children: None    Years of education: None    Highest education level: None   Occupational History    None   Tobacco Use    Smoking status: Former Smoker     Quit date:      Years since quittin 6    Smokeless tobacco: Never Used    Tobacco comment: 27  YEARS AGO    Vaping Use    Vaping Use: Never used   Substance and Sexual Activity    Alcohol use: Not Currently     Comment: occasional    Drug use: Never    Sexual activity: None   Other Topics Concern    None   Social History Narrative    None     Social Determinants of Health     Financial Resource Strain:     Difficulty of Paying Living Expenses:    Food Insecurity:     Worried About Running Out of Food in the Last Year:     Ran Out of Food in the Last Year:    Transportation Needs:     Lack of Transportation (Medical):      Lack of Transportation (Non-Medical):    Physical Activity:     Days of Exercise per Week:     Minutes of Exercise per Session:    Stress:     Feeling of Stress :    Social Connections:     Frequency of Communication with Friends and Family:     Frequency of Social Gatherings with Friends and Family:     Attends [employer] Services:     Active Member of Clubs or Organizations:     Attends Club or Organization Meetings:     Marital Status:    Intimate Partner Violence:     Fear of Current or Ex-Partner:     Emotionally Abused:     Physically Abused:     Sexually Abused:      Past Medical History:   Diagnosis Date    Benign essential hypertension     Colon polyps     Essential hypertension     Hematuria syndrome     Osteoarthritis     Proteinuria     Vitamin D deficiency      Past Surgical History:   Procedure Laterality Date    BREAST BIOPSY Right 2017    benign    COLONOSCOPY  12/2018    3 polyps     COLONOSCOPY      several    MAMMO STEREOTACTIC BREAST BIOPSY LEFT (ALL INC) Left 6/18/2020    MAMMO STEREOTACTIC BREAST BIOPSY RIGHT (ALL INC) Right 6/18/2020    AL LAP,DIAGNOSTIC ABDOMEN N/A 3/24/2020    Procedure: 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;  Surgeon: Kevin Thakkar MD;  [facility]: [facility] MAIN OR;  Service: General    RECTAL BIOPSY N/A 2/9/2018    Procedure: TRANSANAL EXCISION RECTAL POLYP;MUCOSAL POLYPECTOMY;  Surgeon: Aura Nicholson MD;  Location: [facility] MAIN OR;  Service: Colorectal    SMALL INTESTINE SURGERY N/A 4/29/2020    Procedure: RESECTION SMALL BOWEL;  Surgeon: Lukas Loja MD;  Location: [facility] MAIN OR;  Service: Surgical Oncology       Current Outpatient Medications:     ALPRAZolam (XANAX) 0 25 mg tablet, Take 1 tablet (0 25 mg total) by mouth daily at bedtime as needed for anxiety, Disp: 60 tablet, Rfl: 0    Cholecalciferol (Vitamin D3) 50 MCG (2000 UT) capsule, Take 1 capsule (2,000 Units total) by mouth every morning, Disp: , Rfl:     co-enzyme Q-10 50 MG capsule, Take 1 capsule (50 mg total) by mouth every other day, Disp: , Rfl:     fluticasone (FLONASE) 50 mcg/act nasal spray, 1 spray into each nostril daily, Disp: , Rfl:     hydrALAZINE (APRESOLINE) 50 mg tablet, Take 1 tablet (50 mg total) by mouth 3 (three) times a day, Disp: 90 tablet, Rfl: 3    lisinopril (ZESTRIL) 20 mg tablet, Take 1 tablet (20 mg total) by mouth 2 (two) times a day, Disp: 180 tablet, Rfl: 3    metoprolol tartrate (LOPRESSOR) 100 mg tablet, Take 1 tablet by mouth twice daily, Disp: 180 tablet, Rfl: 3    rosuvastatin (CRESTOR) 20 MG tablet, Take 1 tablet (20 mg total) by mouth every other day, Disp: 90 tablet, Rfl: 3    Current Facility-Administered Medications:     cephalexin (KEFLEX) capsule 500 mg, 500 mg, Oral, Q6H [facility], Ryan Ambrose MD    Lab Results   Component Value Date     (L) 05/30/2018    SODIUM 134 02/24/2021    K 4 2 02/24/2021    CL 98 02/24/2021    CO2 29 02/24/2021    ANIONGAP 13 1 05/30/2018    AGAP 7 02/24/2021    BUN 14 06/17/2021    CREATININE 1 08 06/17/2021    GLUC 92 05/02/2020    GLUF 95 02/24/2021    CALCIUM 9 2 02/24/2021    AST 14 02/24/2021    ALT 11 02/24/2021    ALKPHOS 46 (L) 02/24/2021    PROT 7 8 05/04/2018    TP 7 2 02/24/2021    BILITOT 0 7 05/04/2018    TBILI 0 60 02/24/2021    EGFR 53 06/17/2021     Lab Results   Component Value Date    WBC 6 10 02/24/2021    HGB 12 6 02/24/2021    HCT 37 5 (L) 02/24/2021    MCV 88 02/24/2021     (H) 02/24/2021     Lab Results   Component Value Date    CHOLESTEROL 129 02/24/2021    CHOLESTEROL 131 02/25/2020    CHOLESTEROL 150 05/16/2019     Lab Results   Component Value Date    HDL 40 02/24/2021    HDL 45 02/25/2020    HDL 47 05/16/2019     Lab Results   Component Value Date    LDLCALC 57 02/24/2021    LDLCALC 56 02/25/2020    LDLCALC 63 05/16/2019     Lab Results   Component Value Date    TRIG 158 02/24/2021    TRIG 151 09/17/2020    TRIG 151 02/25/2020     No results found for: [place], [place]  Lab Results   Component Value Date    AXS4SFLZYRSD 3 270 02/24/2021     Lab Results   Component Value Date    PTH 60 6 02/24/2021    CALCIUM 9 2 02/24/2021    PHOS 3 4 02/24/2021     No results found for: SPEP, UPEP  No results found for: ALYCIA COLMENARES        Objective:      BP (!) 190/102   Pulse 75   Ht 5' 8" (1 727 m)   Wt 95 7 kg (211 lb)   SpO2 98%   BMI 32 08 kg/m²          Physical Exam  Constitutional:       General: She is not in acute distress  Appearance: She is not toxic-appearing  HENT:      Head: Normocephalic and atraumatic  Right Ear: External ear normal       Left Ear: External ear normal    Eyes:      Extraocular Movements: Extraocular movements intact        Pupils: Pupils are equal, round, and reactive to light  Neck:      Vascular: No carotid bruit  Cardiovascular:      Rate and Rhythm: Normal rate and regular rhythm  Pulmonary:      Effort: Pulmonary effort is normal  No respiratory distress  Breath sounds: Normal breath sounds  No wheezing or rales  Abdominal:      General: Bowel sounds are normal  There is no distension  Palpations: Abdomen is soft  Tenderness: There is no abdominal tenderness  Musculoskeletal:         General: Normal range of motion  Cervical back: Normal range of motion  Right lower leg: No edema  Left lower leg: No edema  Comments: Ankles a bit puffy   Lymphadenopathy:      Cervical: No cervical adenopathy  Skin:     General: Skin is warm and dry  Neurological:      General: No focal deficit present  Mental Status: She is alert  Psychiatric:         Mood and Affect: Mood normal          Behavior: Behavior normal          Thought Content:  Thought content normal          Judgment: Judgment normal

## 2021-08-26 NOTE — PATIENT INSTRUCTIONS
No changes in medications  BP are well controlled  Kidney function is stable  Bring your BP cuff to each appt

## 2021-08-31 ENCOUNTER — OFFICE VISIT (OUTPATIENT)
Dept: GYNECOLOGIC ONCOLOGY | Facility: CLINIC | Age: 71
End: 2021-08-31
Payer: COMMERCIAL

## 2021-08-31 VITALS
WEIGHT: 208 LBS | SYSTOLIC BLOOD PRESSURE: 152 MMHG | OXYGEN SATURATION: 98 % | HEIGHT: 68 IN | BODY MASS INDEX: 31.52 KG/M2 | TEMPERATURE: 97 F | DIASTOLIC BLOOD PRESSURE: 80 MMHG | HEART RATE: 66 BPM | RESPIRATION RATE: 16 BRPM

## 2021-08-31 DIAGNOSIS — R93.89 THICKENED ENDOMETRIUM: Primary | ICD-10-CM

## 2021-08-31 PROCEDURE — 58100 BIOPSY OF UTERUS LINING: CPT | Performed by: NURSE PRACTITIONER

## 2021-08-31 PROCEDURE — 88305 TISSUE EXAM BY PATHOLOGIST: CPT | Performed by: PATHOLOGY

## 2021-08-31 PROCEDURE — 3008F BODY MASS INDEX DOCD: CPT | Performed by: INTERNAL MEDICINE

## 2021-08-31 PROCEDURE — 99214 OFFICE O/P EST MOD 30 MIN: CPT | Performed by: NURSE PRACTITIONER

## 2021-08-31 NOTE — PROGRESS NOTES
Assessment/Plan:    Problem List Items Addressed This Visit        Other    Thickened endometrium - Primary     52-year-old with a history of primary malignant neuroendocrine tumor of small intestine with incidentally-found thickened endometrium measuring 10mm on surveillance CT scan in November 2020  Recent pelvic u/s on 8/11/21 shows continued endometrial thickening of 9mm  These results were reviewed prior to today's visit  She is asymptomatic and has had no episodes of vaginal bleeding or discharge  No pelvic or abdominal pain  Prior to her consult in December 2020, she had not had gyn care in "many years " An endometrial biopsy was obtained today  Her performance status is zero  We again discussed differential diagnoses including benign, pre-malignant, and malignant pathology  The patient understands that the only way to obtain definitive diagnosis is by surgical management  We discussed the limitations of endometrial biopsy  All patient questions answered  Patient will return to the office in 2 weeks for review of results with Dr Park Dorman to discuss D&C or possible hysterectomy  CHIEF COMPLAINT: Thickened endometrium surveillance      Subjective:     Problem:  Cancer Staging  History of malignant neuroendocrine tumor  Staging form: Neuroendocrine Tumor - Jejunum/Ileum, AJCC 8th Edition  - Pathologic stage from 4/29/2020: Stage III (pT3, pN1, cM0) - Unsigned      Previous therapy:  Oncology History   History of malignant neuroendocrine tumor   3/24/2020 Initial Diagnosis    Primary malignant neuroendocrine tumor of small intestine ([facility] [address] )     4/29/2020 Surgery    Small bowel and mesenteric mass, resection:       - Well-differentiated neuroendocrine tumor, 2 4 cm, G2        - Lymph-vascular and perineural invasion are identified  - 4 of 13 lymph nodes positive for metastatic well-differentiated neuroendocrine tumor (4/13)         - Largest lymph node deposit: 1 5 cm with extra-emigdio extension, (mesenteric mass)  - 3 mitotic figures per 2 mm2, (consistent with grade G2)  - The tumor is extremely close to the serosal surface (approximately   0003 cm from serosal surface)  Radiation    The patient saw [unfilled] for radiation treatment  This is the current list of radiation treatment:  Radiation Treatments     No radiation treatments to show  (Treatments may have been administered in another system )            Chemotherapy    [No treatment plan]           Patient ID: Renee Antoine is a 80 y o  female     Patient has no interval concerns  Denies including nausea/vomiting, constipation/diarrhea, abdominal pain, pelvic pain, changes in bladder function, and vaginal bleeding/discharge  Endorses a normal appetite and is without SOB, CP, and bloating  She is ambulatory and independent at home  Review of Systems   Constitutional: Negative for activity change, appetite change, chills, fatigue, fever and unexpected weight change  HENT: Negative for mouth sores  Eyes: Negative  Respiratory: Negative for cough, chest tightness, shortness of breath and wheezing  Cardiovascular: Negative for chest pain, palpitations and leg swelling  Gastrointestinal: Negative for abdominal distention, abdominal pain, anal bleeding, blood in stool, constipation, diarrhea, nausea and vomiting  Endocrine: Negative  Genitourinary: Negative for difficulty urinating, dysuria, flank pain, frequency, hematuria, pelvic pain, urgency, vaginal bleeding, vaginal discharge and vaginal pain  Musculoskeletal: Negative for arthralgias and myalgias  Skin: Negative for color change, pallor and rash  Neurological: Negative for dizziness, weakness, numbness and headaches  Hematological: Negative  Psychiatric/Behavioral: The patient is not nervous/anxious          Current Outpatient Medications   Medication Sig Dispense Refill    Cholecalciferol (Vitamin D3) 50 MCG (2000 UT) capsule Take 1 capsule (2,000 Units total) by mouth every morning      co-enzyme Q-10 50 MG capsule Take 1 capsule (50 mg total) by mouth every other day      fluticasone (FLONASE) 50 mcg/act nasal spray 1 spray into each nostril daily      hydrALAZINE (APRESOLINE) 50 mg tablet Take 1 tablet (50 mg total) by mouth 3 (three) times a day 90 tablet 3    lisinopril (ZESTRIL) 20 mg tablet Take 1 tablet (20 mg total) by mouth 2 (two) times a day 180 tablet 3    metoprolol tartrate (LOPRESSOR) 100 mg tablet Take 1 tablet by mouth twice daily 180 tablet 3    rosuvastatin (CRESTOR) 20 MG tablet Take 1 tablet (20 mg total) by mouth every other day 90 tablet 3    ALPRAZolam (XANAX) 0 25 mg tablet Take 1 tablet (0 25 mg total) by mouth daily at bedtime as needed for anxiety (Patient not taking: Reported on 8/31/2021) 60 tablet 0     Current Facility-Administered Medications   Medication Dose Route Frequency Provider Last Rate Last Admin    cephalexin (KEFLEX) capsule 500 mg  500 mg Oral Q6H Anahy Shaffer MD           No Known Allergies    Past Medical History:   Diagnosis Date    Benign essential hypertension     Colon polyps     Essential hypertension     Hematuria syndrome     Osteoarthritis     Proteinuria     Vitamin D deficiency        Past Surgical History:   Procedure Laterality Date    BREAST BIOPSY Right 2017    benign    COLONOSCOPY  12/2018    3 polyps     COLONOSCOPY      several    MAMMO STEREOTACTIC BREAST BIOPSY LEFT (ALL INC) Left 6/18/2020    MAMMO STEREOTACTIC BREAST BIOPSY RIGHT (ALL INC) Right 6/18/2020    AL LAP,DIAGNOSTIC ABDOMEN N/A 3/24/2020    Procedure: 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;  Surgeon: Jazmin Saavedra MD;  Location: [address] [place] [address] MAIN OR;  Service: General    RECTAL BIOPSY N/A 2/9/2018    Procedure: TRANSANAL EXCISION RECTAL POLYP;MUCOSAL POLYPECTOMY;  Surgeon: Maricarmen Gregorio MD;  Location:  MAIN OR;  Service: Colorectal    SMALL INTESTINE SURGERY N/A 2020    Procedure: RESECTION SMALL BOWEL;  Surgeon: Kalyan Shelton MD;  Location: [facility] MAIN [facility];  Service: Surgical Oncology       OB History        2    Para   2    Term   2            AB        Living           SAB        TAB        Ectopic        Multiple        Live Births                     Family History   Problem Relation Age of Onset    Hypertension Mother     Heart disease Father     No Known Problems Daughter     No Known Problems Maternal Grandmother     No Known Problems Maternal Grandfather     No Known Problems Paternal Grandmother     No Known Problems Paternal Grandfather     Pancreatic cancer Brother     No Known Problems Maternal Aunt     No Known Problems Paternal Aunt     No Known Problems Paternal Aunt        The following portions of the patient's history were reviewed and updated as appropriate: allergies, current medications, past family history, past medical history, past social history, past surgical history and problem list       Objective:    Blood pressure 152/80, pulse 66, temperature (!) 97 °F (36 1 °C), temperature source Temporal, resp  rate 16, height 5' 8" (1 727 m), weight 94 3 kg (208 lb), SpO2 98 %  Body mass index is 31 63 kg/m²  Physical Exam  Vitals reviewed  Exam conducted with a chaperone present  Constitutional:       General: She is not in acute distress  Appearance: Normal appearance  She is well-developed  She is not ill-appearing or diaphoretic  HENT:      Head: Normocephalic and atraumatic  Mouth/Throat:      Mouth: Mucous membranes are moist    Eyes:      General:         Right eye: No discharge  Left eye: No discharge  Conjunctiva/sclera: Conjunctivae normal    Cardiovascular:      Rate and Rhythm: Normal rate and regular rhythm  Heart sounds: Normal heart sounds  Pulmonary:      Effort: Pulmonary effort is normal  No respiratory distress  Breath sounds: Normal breath sounds  Abdominal:      General: There is no distension  Palpations: Abdomen is soft  There is no mass  Tenderness: There is no abdominal tenderness  There is no guarding  Hernia: No hernia is present  Genitourinary:     Vagina: Normal  No vaginal discharge  Comments: The external female genitalia is normal  The bartholin's, uretheral and skenes glands are normal  The urethral meatus is normal (midline with no lesions)  Anus without fissure or lesion  Speculum exam reveals a grossly normal vagina  No masses, lesions,discharge or bleeding  Cervix is visibly and palpably normal  Endometrial biopsy obtained  No significant cystocele or rectocele noted  Bimanual exam notes no masses or fullness  Bladder is without fullness, mass or tenderness  Musculoskeletal:         General: No tenderness  Normal range of motion  Cervical back: Normal range of motion and neck supple  Right lower leg: No edema  Left lower leg: No edema  Lymphadenopathy:      Cervical: No cervical adenopathy  Skin:     General: Skin is warm and dry  Coloration: Skin is not jaundiced or pale  Findings: No erythema or rash  Neurological:      General: No focal deficit present  Mental Status: She is alert and oriented to person, place, and time  Cranial Nerves: No cranial nerve deficit  Sensory: No sensory deficit  Motor: No weakness  Gait: Gait normal    Psychiatric:         Mood and Affect: Mood normal          Behavior: Behavior normal          Thought Content:  Thought content normal          Judgment: Judgment normal              No results found for:   Lab Results   Component Value Date    WBC 6 10 02/24/2021    HGB 12 6 02/24/2021    HCT 37 5 (L) 02/24/2021    MCV 88 02/24/2021     (H) 02/24/2021     Lab Results   Component Value Date     (L) 05/30/2018    K 4 2 02/24/2021    CL 98 02/24/2021    CO2 29 02/24/2021    ANIONGAP 13 1 05/30/2018    BUN 14 06/17/2021 CREATININE 1 08 06/17/2021    GLUF 95 02/24/2021    CALCIUM 9 2 02/24/2021    AST 14 02/24/2021    ALT 11 02/24/2021    ALKPHOS 46 (L) 02/24/2021    PROT 7 8 05/04/2018    BILITOT 0 7 05/04/2018    EGFR 53 06/17/2021        Trend:  No results found for:      Endometrial biopsy    Date/Time: 8/31/2021 11:26 AM  Performed by: Carroll Castleman, CRNP  Authorized by: Carroll Castleman, CRNP   Universal Protocol:  Consent: Verbal consent obtained  Risks and benefits: risks, benefits and alternatives were discussed  Consent given by: patient  Patient understanding: patient states understanding of the procedure being performed      Indication:     Indications: Other disorder of menstruation and other abnormal bleeding from female genital tract    Procedure:     Procedure: endometrial biopsy with Pipelle      A bivalve speculum was placed in the vagina: yes      The cervix was dilated: no      Uterus sound depth (cm):  7    Specimen collected: specimen collected and sent to pathology      Patient tolerated procedure well with no complications: yes    Findings:     Cervix: normal      Adnexa: normal          Radiology:  Study Result    Narrative & Impression   PELVIC ULTRASOUND, COMPLETE     INDICATION:  80years old  R93 [id number]: Abnormal findings on diagnostic imaging of other specified body structures ;  History of metastatic neuroendocrine tumor in the mesentery; Patient with history of endometrial thickening with cystic change status post   endometrial biopsy 12/8/2020 revealing cystic tubal metaplasia without dysplasia/carcinoma; follow-up exam     COMPARISON: Ultrasound 11/30/2020; CT 5/25/2021     TECHNIQUE:   Transabdominal pelvic ultrasound was performed in sagittal and transverse planes with a curvilinear transducer  Additional transvaginal imaging was performed to better evaluate the endometrium and ovaries    Imaging included volumetric   sweeps as well as traditional still imaging technique      FINDINGS:     UTERUS:  The uterus is anteverted in position, measuring 6 8 x 3 1 x 4 5 cm  Contour and echotexture appear normal   The cervix shows no suspicious abnormality      ENDOMETRIUM:    Endometrium is again noted to be somewhat thickened at 9 mm (previously 10 mm)  The thickened endometrium demonstrates a somewhat heterogeneous appearance with areas of multifocal cystic change and/or fluid similar to that of the prior ultrasound of 11/30/2020  The findings are consistent with history of previous biopsy-proven   endometrial tubal metaplasia although areas of endometrial hyperplasia and even carcinoma cannot be reliably excluded on the basis of this exam and appropriate gynecologic correlation is advised  No discrete polypoid /soft tissue mass is identified      OVARIES/ADNEXA:  Neither ovary is visualized  No suspicious adnexal mass or loculated collections  There is no free fluid      IMPRESSION:        1  Continued heterogeneous endometrial thickening with areas of cystic change as noted above  Findings are consistent with the history of biopsy-proven endometrial tubal metaplasia  However, possibility of endometrial hyperplasia and even carcinoma is   not reliably excluded and appropriate gynecologic correlation and follow-up is advised    2   Nonvisualized ovaries      The study was marked in [facility] for significant notification                     Workstation performed: [id number]

## 2021-08-31 NOTE — ASSESSMENT & PLAN NOTE
81-year-old with a history of primary malignant neuroendocrine tumor of small intestine with incidentally-found thickened endometrium measuring 10mm on surveillance CT scan in November 2020  Recent pelvic u/s on 8/11/21 shows continued endometrial thickening of 9mm  These results were reviewed prior to today's visit  She is asymptomatic and has had no episodes of vaginal bleeding or discharge  No pelvic or abdominal pain  Prior to her consult in December 2020, she had not had gyn care in "many years " An endometrial biopsy was obtained today  Her performance status is zero  We again discussed differential diagnoses including benign, pre-malignant, and malignant pathology  The patient understands that the only way to obtain definitive diagnosis is by surgical management  We discussed the limitations of endometrial biopsy  All patient questions answered  Patient will return to the office in 2 weeks for review of results with Dr Nechama Koyanagi to discuss D&C or possible hysterectomy

## 2021-09-14 ENCOUNTER — OFFICE VISIT (OUTPATIENT)
Dept: GYNECOLOGIC ONCOLOGY | Facility: CLINIC | Age: 71
End: 2021-09-14
Payer: COMMERCIAL

## 2021-09-14 VITALS
HEIGHT: 68 IN | HEART RATE: 73 BPM | TEMPERATURE: 97 F | BODY MASS INDEX: 31.22 KG/M2 | WEIGHT: 206 LBS | DIASTOLIC BLOOD PRESSURE: 90 MMHG | RESPIRATION RATE: 16 BRPM | SYSTOLIC BLOOD PRESSURE: 160 MMHG

## 2021-09-14 DIAGNOSIS — R93.89 THICKENED ENDOMETRIUM: Primary | ICD-10-CM

## 2021-09-14 PROCEDURE — 1036F TOBACCO NON-USER: CPT | Performed by: OBSTETRICS & GYNECOLOGY

## 2021-09-14 PROCEDURE — 3080F DIAST BP >= 90 MM HG: CPT | Performed by: OBSTETRICS & GYNECOLOGY

## 2021-09-14 PROCEDURE — 3077F SYST BP >= 140 MM HG: CPT | Performed by: OBSTETRICS & GYNECOLOGY

## 2021-09-14 PROCEDURE — 3008F BODY MASS INDEX DOCD: CPT | Performed by: OBSTETRICS & GYNECOLOGY

## 2021-09-14 PROCEDURE — 1160F RVW MEDS BY RX/DR IN RCRD: CPT | Performed by: OBSTETRICS & GYNECOLOGY

## 2021-09-14 PROCEDURE — 99212 OFFICE O/P EST SF 10 MIN: CPT | Performed by: OBSTETRICS & GYNECOLOGY

## 2021-09-14 NOTE — ASSESSMENT & PLAN NOTE
Incidentally noted on imaging  Asymptomatic  Endometrial biopsy demonstrates no evidence of aplasia malignancy  I shared with patient current guidelines which recommend no further evaluation or treatment  She knows to contact us if she has any vaginal bleeding, drainage, discharge or new symptoms  She will continue to follow with her surgical oncologists for her history of neuroendocrine tumor

## 2021-09-14 NOTE — PATIENT INSTRUCTIONS
Call us immediately if you have any vaginal bleeding, drainage, discharge or new symptoms  Continue to follow with surgical oncology

## 2021-09-14 NOTE — PROGRESS NOTES
Assessment/Plan:    Problem List Items Addressed This Visit        Other    Thickened endometrium - Primary      Incidentally noted on imaging  Asymptomatic  Endometrial biopsy demonstrates no evidence of aplasia malignancy  I shared with patient current guidelines which recommend no further evaluation or treatment  She knows to contact us if she has any vaginal bleeding, drainage, discharge or new symptoms  She will continue to follow with her surgical oncologists for her history of neuroendocrine tumor  CHIEF COMPLAINT:     Here to discuss biopsy results  Problem:  Cancer Staging  History of malignant neuroendocrine tumor  Staging form: Neuroendocrine Tumor - Jejunum/Ileum, AJCC 8th Edition  - Pathologic stage from 4/29/2020: Stage III (pT3, pN1, cM0) - Unsigned        Previous therapy:  Oncology History   History of malignant neuroendocrine tumor   3/24/2020 Initial Diagnosis    Primary malignant neuroendocrine tumor of small intestine ([facility] [address] )     4/29/2020 Surgery    Small bowel and mesenteric mass, resection:       - Well-differentiated neuroendocrine tumor, 2 4 cm, G2        - Lymph-vascular and perineural invasion are identified  - 4 of 13 lymph nodes positive for metastatic well-differentiated neuroendocrine tumor (4/13)  - Largest lymph node deposit: 1 5 cm with extra-emigdio extension, (mesenteric mass)  - 3 mitotic figures per 2 mm2, (consistent with grade G2)  - The tumor is extremely close to the serosal surface (approximately   0003 cm from serosal surface)  Radiation    The patient saw @[employer]@ for radiation treatment  This is the current list of radiation treatment:  Radiation Treatments     No radiation treatments to show   (Treatments may have been administered in another system )            Chemotherapy    [No treatment plan]           Patient ID: Clint Cano is a 80 y o  female  HPI    Patient with 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   Imaging demonstrated incidentally thickened endometrium  In August underwent office endometrial biopsy by our team which demonstrates no hyperplasia or malignancy  This was an adequate biopsy which retrieved atrophic endometrium and sounded the uterus to 7 cm  Patient has never had postmenopausal bleeding, drainage or discharge  This was an incidental finding  She presents today to discuss biopsy results  The following portions of the patient's history were reviewed and updated as appropriate: allergies, current medications, past family history, past medical history, past social history, past surgical history and problem list     Review of Systems    As per HPI  Twelve point review of systems otherwise unremarkable    Current Outpatient Medications   Medication Sig Dispense Refill    Cholecalciferol (Vitamin D3) 50 MCG (2000 UT) capsule Take 1 capsule (2,000 Units total) by mouth every morning      co-enzyme Q-10 50 MG capsule Take 1 capsule (50 mg total) by mouth every other day      fluticasone (FLONASE) 50 mcg/act nasal spray 1 spray into each nostril daily      hydrALAZINE (APRESOLINE) 50 mg tablet Take 1 tablet (50 mg total) by mouth 3 (three) times a day 90 tablet 3    lisinopril (ZESTRIL) 20 mg tablet Take 1 tablet (20 mg total) by mouth 2 (two) times a day 180 tablet 3    metoprolol tartrate (LOPRESSOR) 100 mg tablet Take 1 tablet by mouth twice daily 180 tablet 3    rosuvastatin (CRESTOR) 20 MG tablet Take 1 tablet (20 mg total) by mouth every other day 90 tablet 3    ALPRAZolam (XANAX) 0 25 mg tablet Take 1 tablet (0 25 mg total) by mouth daily at bedtime as needed for anxiety (Patient not taking: Reported on 8/31/2021) 60 tablet 0     Current Facility-Administered Medications   Medication Dose Route Frequency Provider Last Rate Last Admin    cephalexin (KEFLEX) capsule 500 mg  500 mg Oral Q6H [address] [facility], MD               Objective:    Blood pressure 160/90, pulse 73, temperature (!) 97 °F (36 1 °C), temperature source Temporal, resp  rate 16, height 5' 8" (1 727 m), weight 93 4 kg (206 lb)  Body mass index is 31 32 kg/m²  Body surface area is 2 07 meters squared  Physical Exam    Pelvic exam/physical exam deferred  Case Report   Surgical Pathology Report                         Case: [id number]                                    Authorizing Provider: Eve Gilford, CRNP       Collected:           08/31/2021 [id number]               Ordering Location:     [facility] Received:            08/31/2021 1337                                      Oncology [place]                                                            Pathologist:           Winford Pallas, MD                                                          Specimen:    Endometrium, endometrial biopsy                                                            Final Diagnosis   A  Endometrium, biopsy:     - Fragmented atrophic endometrium with stromal collapse  - Inflamed endocervical tissue with squamous metaplasia  - No hyperplasia or carcinoma identified       Electronically signed by Winford Pallas, MD on 9/3/2021 at 10:48 AM   Additional Information    All reported additional testing was performed with appropriately reactive controls   These tests were developed and their performance characteristics determined by [facility] or appropriate performing facility, though some tests may be performed on tissues which have not been validated for performance characteristics (such as staining performed on alcohol exposed cell blocks and decalcified tissues)   Results should be interpreted with caution and in the context of the patients clinical condition  These tests may not be cleared or approved by the [place]  Food and Drug Administration, though the FDA has determined that such clearance or approval is not necessary   These tests are used for clinical purposes and they should not be regarded as investigational or for research  This laboratory has been approved by CLIA 88, designated as a high-complexity laboratory and is qualified to perform these tests     - Interpretation performed at [facility], [address] [facility] [address] [facility], [address] [facility]   Gross Description       A  The specimen is received in formalin, labeled with the patient's name and hospital number, and is designated "endometrial biopsy"  The specimen consists of multiple colorless, mucinous, friable soft tissue fragments measuring in aggregate of 3 0 x 0 8 x 0 2 cm  The specimen is drained into an embedding bag  Entirely submitted  One cassette  Note: due to the size and consistency of specimen, the tissue may not survive histologic processing      Note: The estimated total formalin fixation time based upon information provided by the submitting clinician and the standard processing schedule is under 72 hours      Sebastian Batista MD, [facility]  9/14/2021  2:43 PM

## 2021-09-14 NOTE — LETTER
September 14, 2021     [address] [place] [address]    Patient: Roopa Rodriguez   YOB: 1950   Date of Visit: 9/14/2021       Dear Dr Dunn Book: Thank you for referring Hugo Brooks to me for evaluation  Below are my notes for this consultation  If you have questions, please do not hesitate to call me  I look forward to following your patient along with you  Sincerely,        Filiberto Hernandez MD        CC: MD Filiberto Perez MD  9/14/2021  2:43 PM  Sign when Signing Visit  Assessment/Plan:    Problem List Items Addressed This Visit        Other    Thickened endometrium - Primary      Incidentally noted on imaging  Asymptomatic  Endometrial biopsy demonstrates no evidence of aplasia malignancy  I shared with patient current guidelines which recommend no further evaluation or treatment  She knows to contact us if she has any vaginal bleeding, drainage, discharge or new symptoms  She will continue to follow with her surgical oncologists for her history of neuroendocrine tumor  CHIEF COMPLAINT:     Here to discuss biopsy results  Problem:  Cancer Staging  History of malignant neuroendocrine tumor  Staging form: Neuroendocrine Tumor - Jejunum/Ileum, AJCC 8th Edition  - Pathologic stage from 4/29/2020: Stage III (pT3, pN1, cM0) - Unsigned        Previous therapy:  Oncology History   History of malignant neuroendocrine tumor   3/24/2020 Initial Diagnosis    Primary malignant neuroendocrine tumor of small intestine ([facility] [address] )     4/29/2020 Surgery    Small bowel and mesenteric mass, resection:       - Well-differentiated neuroendocrine tumor, 2 4 cm, G2        - Lymph-vascular and perineural invasion are identified  - 4 of 13 lymph nodes positive for metastatic well-differentiated neuroendocrine tumor (4/13)  - Largest lymph node deposit: 1 5 cm with extra-emigdio extension, (mesenteric mass)         - 3 mitotic figures per 2 mm2, (consistent with grade G2)  - The tumor is extremely close to the serosal surface (approximately   0003 cm from serosal surface)  Radiation    The patient saw [unfilled] for radiation treatment  This is the current list of radiation treatment:  Radiation Treatments     No radiation treatments to show  (Treatments may have been administered in another system )            Chemotherapy    [No treatment plan]           Patient ID: Rajwinder Witt is a 80 y o  female  HPI    Patient with 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  Imaging demonstrated incidentally thickened endometrium  In August underwent office endometrial biopsy by our team which demonstrates no hyperplasia or malignancy  This was an adequate biopsy which retrieved atrophic endometrium and sounded the uterus to 7 cm  Patient has never had postmenopausal bleeding, drainage or discharge  This was an incidental finding  She presents today to discuss biopsy results  The following portions of the patient's history were reviewed and updated as appropriate: allergies, current medications, past family history, past medical history, past social history, past surgical history and problem list     Review of Systems    As per [facility]  Twelve point review of systems otherwise unremarkable    Current Outpatient Medications   Medication Sig Dispense Refill    Cholecalciferol (Vitamin D3) 50 MCG (2000 UT) capsule Take 1 capsule (2,000 Units total) by mouth every morning      co-enzyme Q-10 50 MG capsule Take 1 capsule (50 mg total) by mouth every other day      fluticasone (FLONASE) 50 mcg/act nasal spray 1 spray into each nostril daily      hydrALAZINE (APRESOLINE) 50 mg tablet Take 1 tablet (50 mg total) by mouth 3 (three) times a day 90 tablet 3    lisinopril (ZESTRIL) 20 mg tablet Take 1 tablet (20 mg total) by mouth 2 (two) times a day 180 tablet 3    metoprolol tartrate (LOPRESSOR) 100 mg tablet Take 1 tablet by mouth twice daily 180 tablet 3    rosuvastatin (CRESTOR) 20 MG tablet Take 1 tablet (20 mg total) by mouth every other day 90 tablet 3    ALPRAZolam (XANAX) 0 25 mg tablet Take 1 tablet (0 25 mg total) by mouth daily at bedtime as needed for anxiety (Patient not taking: Reported on 8/31/2021) 60 tablet 0     Current Facility-Administered Medications   Medication Dose Route Frequency Provider Last Rate Last Admin    cephalexin (KEFLEX) capsule 500 mg  500 mg Oral Q6H Haydee Puentes MD               Objective:    Blood pressure 160/90, pulse 73, temperature (!) 97 °F (36 1 °C), temperature source Temporal, resp  rate 16, height 5' 8" (1 727 m), weight 93 4 kg (206 lb)  Body mass index is 31 32 kg/m²  Body surface area is 2 07 meters squared  Physical Exam    Pelvic exam/physical exam deferred  Case Report   Surgical Pathology Report                         Case: [id number]                                    Authorizing Provider: JAHAIRA Garza       Collected:           08/31/2021 1337               Ordering Location:     [facility] Received:            08/31/2021 1337                                      [facility]                                                            Pathologist:           James Rodriguez MD                                                          Specimen:    Endometrium, endometrial biopsy                                                            Final Diagnosis   A  Endometrium, biopsy:     - Fragmented atrophic endometrium with stromal collapse  - Inflamed endocervical tissue with squamous metaplasia       - No hyperplasia or carcinoma identified       Electronically signed by James Rodriguez MD on 9/3/2021 at 10:48 AM   Additional Information    All reported additional testing was performed with appropriately reactive controls   These tests were developed and their performance characteristics determined by [facility] or appropriate performing facility, though some tests may be performed on tissues which have not been validated for performance characteristics (such as staining performed on alcohol exposed cell blocks and decalcified tissues)   Results should be interpreted with caution and in the context of the patients clinical condition  These tests may not be cleared or approved by the [place]  Food and Drug Administration, though the FDA has determined that such clearance or approval is not necessary  These tests are used for clinical purposes and they should not be regarded as investigational or for research  This laboratory has been approved by CLIA 88, designated as a high-complexity laboratory and is qualified to perform these tests     - Interpretation performed at [facility], [address] [facility], [facility], [address] [facility]   Gross Description       A  The specimen is received in formalin, labeled with the patient's name and hospital number, and is designated "endometrial biopsy"  The specimen consists of multiple colorless, mucinous, friable soft tissue fragments measuring in aggregate of 3 0 x 0 8 x 0 2 cm  The specimen is drained into an embedding bag  Entirely submitted  One cassette  Note: due to the size and consistency of specimen, the tissue may not survive histologic processing      Note: The estimated total formalin fixation time based upon information provided by the submitting clinician and the standard processing schedule is under 72 hours      Manolo Eng MD, [address]  9/14/2021  2:43 PM

## 2021-11-30 DIAGNOSIS — I10 ESSENTIAL HYPERTENSION: ICD-10-CM

## 2021-11-30 RX ORDER — HYDRALAZINE HYDROCHLORIDE 50 MG/1
50 TABLET, FILM COATED ORAL 3 TIMES DAILY
Qty: 270 TABLET | Refills: 3 | Status: SHIPPED | OUTPATIENT
Start: 2021-11-30

## 2021-12-07 ENCOUNTER — APPOINTMENT (OUTPATIENT)
Dept: LAB | Facility: HOSPITAL | Age: 71
End: 2021-12-07
Payer: COMMERCIAL

## 2021-12-07 DIAGNOSIS — C7A.00 CARCINOID TUMOR METASTATIC TO INTRA-ABDOMINAL LYMPH NODE (HCC): ICD-10-CM

## 2021-12-07 DIAGNOSIS — C7B.09 CARCINOID TUMOR METASTATIC TO INTRA-ABDOMINAL LYMPH NODE (HCC): ICD-10-CM

## 2021-12-07 PROCEDURE — 86316 IMMUNOASSAY TUMOR OTHER: CPT

## 2021-12-09 ENCOUNTER — HOSPITAL ENCOUNTER (OUTPATIENT)
Dept: CT IMAGING | Facility: HOSPITAL | Age: 71
Discharge: HOME | End: 2021-12-09
Payer: COMMERCIAL

## 2021-12-09 DIAGNOSIS — C7B.09 CARCINOID TUMOR METASTATIC TO INTRA-ABDOMINAL LYMPH NODE (HCC): ICD-10-CM

## 2021-12-09 DIAGNOSIS — C7A.00 CARCINOID TUMOR METASTATIC TO INTRA-ABDOMINAL LYMPH NODE (HCC): ICD-10-CM

## 2021-12-09 LAB — CGA SERPL-MCNC: 63.9 NG/ML (ref 0–101.8)

## 2021-12-09 PROCEDURE — 74177 CT ABD & PELVIS W/CONTRAST: CPT

## 2021-12-09 PROCEDURE — 71260 CT THORAX DX C+: CPT

## 2021-12-09 PROCEDURE — G1004 CDSM NDSC: HCPCS

## 2021-12-09 RX ADMIN — IOHEXOL 100 ML: 350 INJECTION, SOLUTION INTRAVENOUS at 08:07

## 2021-12-17 ENCOUNTER — OFFICE VISIT (OUTPATIENT)
Dept: SURGICAL ONCOLOGY | Facility: CLINIC | Age: 71
End: 2021-12-17
Payer: COMMERCIAL

## 2021-12-17 VITALS
DIASTOLIC BLOOD PRESSURE: 80 MMHG | BODY MASS INDEX: 31.67 KG/M2 | SYSTOLIC BLOOD PRESSURE: 142 MMHG | HEIGHT: 68 IN | HEART RATE: 65 BPM | OXYGEN SATURATION: 98 % | WEIGHT: 209 LBS | TEMPERATURE: 97 F | RESPIRATION RATE: 16 BRPM

## 2021-12-17 DIAGNOSIS — Z85.9 HISTORY OF MALIGNANT NEUROENDOCRINE TUMOR: ICD-10-CM

## 2021-12-17 DIAGNOSIS — Z08 ENCOUNTER FOR FOLLOW-UP EXAMINATION AFTER COMPLETED TREATMENT FOR MALIGNANT NEOPLASM: Primary | ICD-10-CM

## 2021-12-17 PROCEDURE — 3008F BODY MASS INDEX DOCD: CPT | Performed by: SURGERY

## 2021-12-17 PROCEDURE — 3077F SYST BP >= 140 MM HG: CPT | Performed by: SURGERY

## 2021-12-17 PROCEDURE — 1036F TOBACCO NON-USER: CPT | Performed by: SURGERY

## 2021-12-17 PROCEDURE — 3079F DIAST BP 80-89 MM HG: CPT | Performed by: SURGERY

## 2021-12-17 PROCEDURE — 1160F RVW MEDS BY RX/DR IN RCRD: CPT | Performed by: SURGERY

## 2021-12-17 PROCEDURE — 99213 OFFICE O/P EST LOW 20 MIN: CPT | Performed by: SURGERY

## 2022-02-23 ENCOUNTER — TELEPHONE (OUTPATIENT)
Dept: NEPHROLOGY | Facility: CLINIC | Age: 72
End: 2022-02-23

## 2022-02-23 DIAGNOSIS — I10 ESSENTIAL HYPERTENSION: Primary | ICD-10-CM

## 2022-02-23 DIAGNOSIS — C7A.00 CARCINOID TUMOR METASTATIC TO INTRA-ABDOMINAL LYMPH NODE (HCC): ICD-10-CM

## 2022-02-23 DIAGNOSIS — C7B.09 CARCINOID TUMOR METASTATIC TO INTRA-ABDOMINAL LYMPH NODE (HCC): ICD-10-CM

## 2022-02-23 DIAGNOSIS — N18.31 STAGE 3A CHRONIC KIDNEY DISEASE (HCC): ICD-10-CM

## 2022-02-23 NOTE — TELEPHONE ENCOUNTER
Left message for patient to have fasting blood work done before her appointment on Tuesday with Dr Eduardo  Told her to call back if any questions

## 2022-02-25 ENCOUNTER — APPOINTMENT (OUTPATIENT)
Dept: LAB | Facility: HOSPITAL | Age: 72
End: 2022-02-25
Attending: INTERNAL MEDICINE
Payer: COMMERCIAL

## 2022-02-25 DIAGNOSIS — I10 ESSENTIAL HYPERTENSION: ICD-10-CM

## 2022-02-25 DIAGNOSIS — N18.31 STAGE 3A CHRONIC KIDNEY DISEASE (HCC): ICD-10-CM

## 2022-02-25 LAB
ANION GAP SERPL CALCULATED.3IONS-SCNC: 6 MMOL/L (ref 4–13)
BUN SERPL-MCNC: 14 MG/DL (ref 5–25)
CALCIUM SERPL-MCNC: 9.2 MG/DL (ref 8.4–10.2)
CHLORIDE SERPL-SCNC: 100 MMOL/L (ref 96–108)
CO2 SERPL-SCNC: 28 MMOL/L (ref 21–32)
CREAT SERPL-MCNC: 1.02 MG/DL (ref 0.6–1.3)
GFR SERPL CREATININE-BSD FRML MDRD: 55 ML/MIN/1.73SQ M
GLUCOSE P FAST SERPL-MCNC: 101 MG/DL (ref 65–99)
POTASSIUM SERPL-SCNC: 4.8 MMOL/L (ref 3.5–5.3)
SODIUM SERPL-SCNC: 134 MMOL/L (ref 135–147)

## 2022-02-25 PROCEDURE — 80048 BASIC METABOLIC PNL TOTAL CA: CPT

## 2022-02-25 PROCEDURE — 36415 COLL VENOUS BLD VENIPUNCTURE: CPT

## 2022-02-28 ENCOUNTER — TELEPHONE (OUTPATIENT)
Dept: NEPHROLOGY | Facility: CLINIC | Age: 72
End: 2022-02-28

## 2022-02-28 NOTE — TELEPHONE ENCOUNTER
Appointment Confirmation   Person confirmed appointment with  If not patient, name of the person Left message to confirm   Date and time of appointment 3/1/22 @ 9:00 am   Patient acknowledged and will be at appointment? no    Did you advise the patient that they will need a urine sample if they are a new patient?  N/A    Did you advise the patient to bring their current medications for verification? (including any OTC) Yes    Additional Information

## 2022-03-01 ENCOUNTER — OFFICE VISIT (OUTPATIENT)
Dept: NEPHROLOGY | Facility: CLINIC | Age: 72
End: 2022-03-01
Payer: COMMERCIAL

## 2022-03-01 VITALS
WEIGHT: 213 LBS | SYSTOLIC BLOOD PRESSURE: 152 MMHG | BODY MASS INDEX: 32.39 KG/M2 | HEART RATE: 67 BPM | DIASTOLIC BLOOD PRESSURE: 88 MMHG | OXYGEN SATURATION: 98 %

## 2022-03-01 DIAGNOSIS — E27.8 ADRENAL NODULE (HCC): ICD-10-CM

## 2022-03-01 DIAGNOSIS — E78.00 PURE HYPERCHOLESTEROLEMIA: ICD-10-CM

## 2022-03-01 DIAGNOSIS — M79.604 RIGHT LEG PAIN: ICD-10-CM

## 2022-03-01 DIAGNOSIS — N18.31 STAGE 3A CHRONIC KIDNEY DISEASE (HCC): Primary | ICD-10-CM

## 2022-03-01 DIAGNOSIS — C7B.09 CARCINOID TUMOR METASTATIC TO INTRA-ABDOMINAL LYMPH NODE (HCC): ICD-10-CM

## 2022-03-01 DIAGNOSIS — C7A.00 CARCINOID TUMOR METASTATIC TO INTRA-ABDOMINAL LYMPH NODE (HCC): ICD-10-CM

## 2022-03-01 DIAGNOSIS — E55.9 VITAMIN D DEFICIENCY: ICD-10-CM

## 2022-03-01 DIAGNOSIS — I10 ESSENTIAL HYPERTENSION: ICD-10-CM

## 2022-03-01 PROCEDURE — 99214 OFFICE O/P EST MOD 30 MIN: CPT | Performed by: INTERNAL MEDICINE

## 2022-03-01 NOTE — ASSESSMENT & PLAN NOTE
Lab Results   Component Value Date    EGFR 55 02/25/2022    EGFR 50 12/07/2021    EGFR 53 06/17/2021    CREATININE 1 02 02/25/2022    CREATININE 1 12 12/07/2021    CREATININE 1 08 06/17/2021    she is stable stage IIIA chronic kidney disease with a baseline creatinine of 1-1 1 mg/dL  We do not have any recent urine studies so will order a microalbumin to creatinine ratio and protein to creatinine ratio  She avoids NSAID and only uses Tylenol  She had normal kidneys on CT    Will obtain an ultrasound of her kidneys for complete

## 2022-03-01 NOTE — PROGRESS NOTES
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 [facility])      She is followed by  surgical oncology and is stable   Will have a repeat CT  In June            Genitourinary    Stage 3 chronic kidney disease [facility]) - Primary     Lab Results   Component Value Date    EGFR 55 02/25/2022    EGFR 50 12/07/2021    EGFR 53 06/17/2021    CREATININE 1 02 02/25/2022    CREATININE 1 12 12/07/2021    CREATININE 1 08 06/17/2021    she is stable stage IIIA chronic kidney disease with a baseline creatinine of 1-1 1 mg/dL  We do not have any recent urine studies so will order a microalbumin to creatinine ratio and protein to creatinine ratio  She avoids NSAID and only uses Tylenol  She had normal kidneys on CT  Will obtain an ultrasound of her kidneys for complete            Other    Pure hypercholesterolemia     Aim for an LDL < 70 due to chronic kidney disease         Vitamin D deficiency      Continues vitamin D supplementation                 Subjective:      Patient ID: Ridge Lam is a 80 y o  female  referred by Machelle Domínguez    HPI Has a history of hypertension and chronic kidney disease stage 3 A  No new issues or hsopital admissions or medications since last visit  She has a history of malignant neuroendocrine tumor  She had surgery on 04/29/2024 for small-bowel mesenteric mass resection  She had a stable CT December 9, 2021 without evidence of recurrence    She did not have radiation therapy or chemotherapy    The following portions of the patient's history were reviewed and updated as appropriate: allergies, current medications, past family history, past medical history, past social history, past surgical history and problem list     Review of Systems   Constitutional: Negative for fatigue  HENT: Negative for hearing loss  Eyes: Negative for visual disturbance  Respiratory: Negative for cough and shortness of breath  Cardiovascular: Negative for chest pain, palpitations and leg swelling  Gastrointestinal: Negative for abdominal pain, blood in stool, constipation, diarrhea, nausea and vomiting  Genitourinary: Negative for difficulty urinating, dysuria, frequency, hematuria and urgency  Musculoskeletal: Positive for arthralgias  Right leg pain  Pain on climbing table   Neurological: Negative for dizziness, weakness, light-headedness and headaches  Hematological: Does not bruise/bleed easily  Psychiatric/Behavioral: Negative for dysphoric mood           Social History     Socioeconomic History    Marital status: /Civil Union     Spouse name: None    Number of children: None    Years of education: None    Highest education level: None   Occupational History    None   Tobacco Use    Smoking status: Former Smoker     Quit date:      Years since quittin 1    Smokeless tobacco: Never Used    Tobacco comment: 27  YEARS AGO    Vaping Use    Vaping Use: Never used   Substance and Sexual Activity    Alcohol use: Not Currently     Comment: occasional    Drug use: Never    Sexual activity: None   Other Topics Concern    None   Social History Narrative    None     Social Determinants of Health     Financial Resource Strain: Not on file   Food Insecurity: Not on file   Transportation Needs: Not on file   Physical Activity: Not on file   Stress: Not on file   Social Connections: Not on file   Intimate Partner Violence: Not on file   Housing Stability: Not on file     Past Medical History:   Diagnosis Date    Benign essential hypertension     Colon polyps     Essential hypertension     Hematuria syndrome     Osteoarthritis     Proteinuria     Vitamin D deficiency      Past Surgical History:   Procedure Laterality Date    BREAST BIOPSY Right 2017    benign    COLONOSCOPY  12/2018    3 polyps     COLONOSCOPY      several    MAMMO STEREOTACTIC BREAST BIOPSY LEFT (ALL INC) Left 6/18/2020    MAMMO STEREOTACTIC BREAST BIOPSY RIGHT (ALL INC) Right 6/18/2020    DC LAP,DIAGNOSTIC ABDOMEN N/A 3/24/2020    Procedure: 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;  Surgeon: Ary Najjar, MD;  Location: [facility] MAIN OR;  Service: General    RECTAL BIOPSY N/A 2/9/2018    Procedure: TRANSANAL EXCISION RECTAL POLYP;MUCOSAL POLYPECTOMY;  Surgeon: Jenaro Guillaume MD;  Location: [facility] MAIN OR;  Service: Colorectal    SMALL INTESTINE SURGERY N/A 4/29/2020    Procedure: RESECTION SMALL BOWEL;  Surgeon: Mai Esteves MD;  Location: [facility] MAIN OR;  Service: Surgical Oncology       Current Outpatient Medications:     Cholecalciferol (Vitamin D3) 50 MCG (2000 UT) capsule, Take 1 capsule (2,000 Units total) by mouth every morning, Disp: , Rfl:     co-enzyme Q-10 50 MG capsule, Take 1 capsule (50 mg total) by mouth every other day, Disp: , Rfl:     fluticasone (FLONASE) 50 mcg/act nasal spray, 1 spray into each nostril daily, Disp: , Rfl:     hydrALAZINE (APRESOLINE) 50 mg tablet, Take 1 tablet (50 mg total) by mouth 3 (three) times a day, Disp: 270 tablet, Rfl: 3    lisinopril (ZESTRIL) 20 mg tablet, Take 1 tablet (20 mg total) by mouth 2 (two) times a day, Disp: 180 tablet, Rfl: 3    metoprolol tartrate (LOPRESSOR) 100 mg tablet, Take 1 tablet by mouth twice daily, Disp: 180 tablet, Rfl: 3    rosuvastatin (CRESTOR) 20 MG tablet, Take 1 tablet (20 mg total) by mouth every other day, Disp: 90 tablet, Rfl: 3    ALPRAZolam (XANAX) 0 25 mg tablet, Take 1 tablet (0 25 mg total) by mouth daily at bedtime as needed for anxiety (Patient not taking: Reported on 3/1/2022 ), Disp: 60 tablet, Rfl: 0    Current Facility-Administered Medications:     cephalexin (KEFLEX) capsule 500 mg, 500 mg, Oral, Q6H [facility], Ananya Newby MD    Lab Results   Component Value Date     (L) 05/30/2018    SODIUM 134 (L) 02/25/2022    K 4 8 02/25/2022     02/25/2022    CO2 28 02/25/2022    ANIONGAP 13 1 05/30/2018    AGAP 6 02/25/2022    BUN 14 02/25/2022    CREATININE 1 02 02/25/2022    GLUC 92 05/02/2020    GLUF 101 (H) 02/25/2022    CALCIUM 9 2 02/25/2022    AST 14 02/24/2021    ALT 11 02/24/2021    ALKPHOS 46 (L) 02/24/2021    PROT 7 8 05/04/2018    TP 7 2 02/24/2021    BILITOT 0 7 05/04/2018    TBILI 0 60 02/24/2021    EGFR 55 02/25/2022     Lab Results   Component Value Date    WBC 6 10 02/24/2021    HGB 12 6 02/24/2021    HCT 37 5 (L) 02/24/2021    MCV 88 02/24/2021     (H) 02/24/2021     Lab Results   Component Value Date    CHOLESTEROL 129 02/24/2021    CHOLESTEROL 131 02/25/2020    CHOLESTEROL 150 05/16/2019     Lab Results   Component Value Date    HDL 40 02/24/2021    HDL 45 02/25/2020    HDL 47 05/16/2019     Lab Results   Component Value Date    LDLCALC 57 02/24/2021    LDLCALC 56 02/25/2020    LDLCALC 63 05/16/2019     Lab Results   Component Value Date    TRIG 158 02/24/2021    TRIG 151 09/17/2020    TRIG 151 02/25/2020     No results found for: [place], [place]  Lab Results   Component Value Date    YWB3NPLUMVAR 3 270 02/24/2021     Lab Results   Component Value Date    PTH 60 6 02/24/2021    CALCIUM 9 2 02/25/2022    PHOS 3 4 02/24/2021     No results found for: SPEP, UPEP  No results found for: MICROALBUR, [id number]        Objective:      /88   Pulse 67   Wt 96 6 kg (213 lb)   SpO2 98%   BMI 32 39 kg/m²          Physical Exam  Vitals reviewed  Constitutional:       General: She is not in acute distress  Appearance: She is not toxic-appearing  HENT:      Head: Normocephalic and atraumatic        Right Ear: External ear normal       Left Ear: External ear normal    Eyes:      Extraocular Movements: Extraocular movements intact  Pupils: Pupils are equal, round, and reactive to light  Cardiovascular:      Rate and Rhythm: Normal rate and regular rhythm  Heart sounds: Normal heart sounds, S1 normal and S2 normal    Musculoskeletal:      Cervical back: Normal range of motion and neck supple  Right lower leg: No edema  Left lower leg: No edema  Lymphadenopathy:      Cervical: No cervical adenopathy  Neurological:      Mental Status: She is alert

## 2022-03-01 NOTE — ASSESSMENT & PLAN NOTE
She has longstanding white coat hypertension  Her blood pressures at home are excellent in the 120-130 range    Would not adjust medications based on office readings

## 2022-03-04 ENCOUNTER — APPOINTMENT (OUTPATIENT)
Dept: LAB | Facility: HOSPITAL | Age: 72
End: 2022-03-04
Attending: INTERNAL MEDICINE
Payer: COMMERCIAL

## 2022-03-04 ENCOUNTER — HOSPITAL ENCOUNTER (OUTPATIENT)
Dept: ULTRASOUND IMAGING | Facility: HOSPITAL | Age: 72
Discharge: HOME | End: 2022-03-04
Attending: INTERNAL MEDICINE
Payer: COMMERCIAL

## 2022-03-04 ENCOUNTER — RA CDI HCC (OUTPATIENT)
Dept: OTHER | Facility: HOSPITAL | Age: 72
End: 2022-03-04

## 2022-03-04 DIAGNOSIS — N18.31 STAGE 3A CHRONIC KIDNEY DISEASE (HCC): ICD-10-CM

## 2022-03-04 LAB
BACTERIA UR QL AUTO: NORMAL /HPF
BILIRUB UR QL STRIP: NEGATIVE
CLARITY UR: CLEAR
COLOR UR: YELLOW
CREAT UR-MCNC: 50 MG/DL
CREAT UR-MCNC: 50 MG/DL
GLUCOSE UR STRIP-MCNC: NEGATIVE MG/DL
HGB UR QL STRIP.AUTO: NEGATIVE
KETONES UR STRIP-MCNC: NEGATIVE MG/DL
LEUKOCYTE ESTERASE UR QL STRIP: NEGATIVE
MICROALBUMIN UR-MCNC: <5 MG/L (ref 0–20)
MICROALBUMIN/CREAT 24H UR: <10 MG/G CREATININE (ref 0–30)
NITRITE UR QL STRIP: NEGATIVE
NON-SQ EPI CELLS URNS QL MICRO: NORMAL /HPF
PH UR STRIP.AUTO: 7 [PH]
PROT UR STRIP-MCNC: NEGATIVE MG/DL
PROT UR-MCNC: <6 MG/DL
PROT/CREAT UR: <0.12 MG/G{CREAT} (ref 0–0.1)
RBC #/AREA URNS AUTO: NORMAL /HPF
SP GR UR STRIP.AUTO: 1.01 (ref 1–1.03)
UROBILINOGEN UR QL STRIP.AUTO: 0.2 E.U./DL
WBC #/AREA URNS AUTO: NORMAL /HPF

## 2022-03-04 PROCEDURE — 81001 URINALYSIS AUTO W/SCOPE: CPT

## 2022-03-04 PROCEDURE — 82043 UR ALBUMIN QUANTITATIVE: CPT

## 2022-03-04 PROCEDURE — 82570 ASSAY OF URINE CREATININE: CPT

## 2022-03-04 PROCEDURE — 84156 ASSAY OF PROTEIN URINE: CPT

## 2022-03-04 PROCEDURE — 76770 US EXAM ABDO BACK WALL COMP: CPT

## 2022-03-05 ENCOUNTER — APPOINTMENT (OUTPATIENT)
Dept: RADIOLOGY | Facility: CLINIC | Age: 72
End: 2022-03-05
Payer: COMMERCIAL

## 2022-03-05 ENCOUNTER — OFFICE VISIT (OUTPATIENT)
Dept: OBGYN CLINIC | Facility: CLINIC | Age: 72
End: 2022-03-05
Payer: COMMERCIAL

## 2022-03-05 VITALS
HEIGHT: 68 IN | SYSTOLIC BLOOD PRESSURE: 174 MMHG | WEIGHT: 216 LBS | HEART RATE: 76 BPM | BODY MASS INDEX: 32.74 KG/M2 | DIASTOLIC BLOOD PRESSURE: 102 MMHG

## 2022-03-05 DIAGNOSIS — G89.29 HIP PAIN, CHRONIC, RIGHT: ICD-10-CM

## 2022-03-05 DIAGNOSIS — G89.29 CHRONIC PAIN OF RIGHT KNEE: ICD-10-CM

## 2022-03-05 DIAGNOSIS — S89.91XA KNEE INJURY, RIGHT, INITIAL ENCOUNTER: ICD-10-CM

## 2022-03-05 DIAGNOSIS — M79.604 RIGHT LEG PAIN: ICD-10-CM

## 2022-03-05 DIAGNOSIS — M25.551 HIP PAIN, CHRONIC, RIGHT: ICD-10-CM

## 2022-03-05 DIAGNOSIS — M25.561 CHRONIC PAIN OF RIGHT KNEE: ICD-10-CM

## 2022-03-05 DIAGNOSIS — M16.11 PRIMARY OSTEOARTHRITIS OF ONE HIP, RIGHT: Primary | ICD-10-CM

## 2022-03-05 PROCEDURE — 3077F SYST BP >= 140 MM HG: CPT | Performed by: FAMILY MEDICINE

## 2022-03-05 PROCEDURE — 3080F DIAST BP >= 90 MM HG: CPT | Performed by: FAMILY MEDICINE

## 2022-03-05 PROCEDURE — 73502 X-RAY EXAM HIP UNI 2-3 VIEWS: CPT

## 2022-03-05 PROCEDURE — 99204 OFFICE O/P NEW MOD 45 MIN: CPT | Performed by: FAMILY MEDICINE

## 2022-03-05 PROCEDURE — 73562 X-RAY EXAM OF KNEE 3: CPT

## 2022-03-05 NOTE — PROGRESS NOTES
[facility]  coding opportunities       Chart reviewed, no opportunity found: CHART REVIEWED, NO OPPORTUNITY FOUND                        Patients insurance company: [employer]

## 2022-03-05 NOTE — PROGRESS NOTES
[facility] [place]  [address]  [place] [facility] [place] [address]  [phone]      Chief Complaint:  Chief Complaint   Patient presents with    Right Hip - Pain    Right Knee - Pain       Vitals:  BP (!) 174/102   Pulse 76   Ht 5' 8" (1 727 m)   Wt 98 kg (216 lb)   BMI 32 84 kg/m²     The following portions of the patient's history were reviewed and updated as appropriate: allergies, current medications, past family history, past medical history, past social history, past surgical history, and problem list       Subjective:   Patient ID: Eyal Rowe is a 80 y o  female  Here c/o R hip/knee pain  Pain for about 1 month  She fell off the curb at the hospital and stepped in a hole and fell on her her R knee  Pain since then  No swelling  Locks/giving out  Hurts to walk intermittently  Good/bad days  Worse with certain movement- sharp pain  Taking tylenol  Hx of neuromalignant tumor with small bowel resection  Pain with stairs  Pain radiates to R knee      Review of Systems   Constitutional: Negative for fatigue and fever  Respiratory: Negative for shortness of breath  Cardiovascular: Negative for chest pain  Gastrointestinal: Negative for abdominal pain and nausea  Genitourinary: Negative for dysuria  Musculoskeletal: Positive for arthralgias and gait problem  Skin: Negative for rash and wound  Neurological: Negative for weakness and headaches  Objective:  Right Knee Exam     Muscle Strength   The patient has normal right knee strength  Tenderness   The patient is experiencing no tenderness  Range of Motion   The patient has normal right knee ROM  Tests   Zaheer:  Medial - negative Lateral - negative  Varus: negative Valgus: negative    Other   Swelling: none  Effusion: no effusion present      Right Hip Exam     Tenderness   The patient is experiencing tenderness in the anterior      Range of Motion   The patient has normal right hip ROM  Muscle Strength   The patient has normal right hip strength  Tests   JENELLE: negative    Comments:  Pain with  Abd/add/int/ext rotation/flex          Observations     Right Knee   Negative for effusion  Physical Exam  Vitals and nursing note reviewed  Constitutional:       Appearance: Normal appearance  She is well-developed  HENT:      Head: Normocephalic  Mouth/Throat:      Mouth: Mucous membranes are moist    Eyes:      Extraocular Movements: Extraocular movements intact  Cardiovascular:      Rate and Rhythm: Normal rate and regular rhythm  Heart sounds: Normal heart sounds  Pulmonary:      Effort: Pulmonary effort is normal       Breath sounds: Normal breath sounds  Abdominal:      General: Bowel sounds are normal       Palpations: Abdomen is soft  Musculoskeletal:         General: Tenderness present  Normal range of motion  Cervical back: Normal range of motion  Right knee: No effusion  Instability Tests: Medial Zaheer test negative and lateral Zaheer test negative  Skin:     General: Skin is warm and dry  Neurological:      General: No focal deficit present  Mental Status: She is alert and oriented to person, place, and time  Psychiatric:         Mood and Affect: Mood normal          Behavior: Behavior normal          Thought Content: Thought content normal          I have personally reviewed pertinent films in PACS and my interpretation is XR R hip- mod DJD,  R knee- medial joint space narrowing  no fx  Assessment/Plan:  Assessment/Plan   Diagnoses and all orders for this visit:    Chronic pain of right knee  -     XR knee 3 vw right non injury; Future    Right leg pain  -     Ambulatory Referral to Orthopedic Surgery    Hip pain, chronic, right  -     XR hip/pelv 2-3 vws right if performed; Future    Knee injury, right, initial encounter  -     XR knee 3 vw right non injury; Future        No follow-ups on file  Kaitlin Boyd MD

## 2022-03-05 NOTE — PATIENT INSTRUCTIONS
F/u 4 wks  Begin physical therapy  Icing/heat/OTC pain meds as needed  Hip  Exercises   AMBULATORY CARE:   Hip  exercises  help strengthen the muscles in your hip and keep the joint flexible  Strong muscles can help reduce pain, prevent injury, and keep the joint stable  The exercises can also help increase the range of motion in your hip joint  What you need to know about exercise safety:   · Move slowly and smoothly  Avoid fast or jerky motions  This will help prevent an injury  · Breathe normally  Do not hold your breath  It is important to breathe in and out so you do not tense up during exercise  Tension could prevent you from moving your joint in a full range of motion  · Do the exercises and stretches on both legs  Do this so both hips remain strong and flexible  · Stop if you feel sharp pain or an increase in pain  Contact your healthcare provider or physical therapist  It is normal to feel some discomfort during exercise  Regular exercise will help decrease your discomfort over time  · Warm up before you stretch and exercise  Walk or ride a stationary bike for 5 to 10 minutes  How to do hip stretches: Your healthcare provider or physical therapist will tell you how many times to do each stretch  Do the stretch on both sides before you move to the next stretch  · Standing iliotibial band stretch:  Stand with the leg on your injured side behind your other leg  Bend sideways toward the side that is not injured  Stop when you feel a stretch in your outer hip  Hold for 5 to 10 seconds  Then return to the starting position  · Lying iliotibial band stretch:  Lie on your back  Bend the knee on your injured side toward your chest  Place your hands on the outside of your knee and thigh  Slowly pull the knee across your body  Stop when you feel a stretch in your hip and outer thigh  Hold for 5 to 10 seconds  Return your leg to the starting position           · Hip stretch:  Domenic Santo on your back with both legs straight and on the ground  Bend the knee on your injured side toward your chest until you can reach your lower leg  Place both hands on your shin and pull your knee toward your chest  Hold for 5 to 10 seconds  Return your leg to the starting position  · Knee to chest:  Lie on your back with both knees bent and feet flat on the floor  Bend the knee on your injured side toward your chest until you can reach your lower leg  Place both hands on your shin and pull your knee toward your chest  Hold for 5 to 10 seconds  Return your leg to the starting position  · Internal hip rotator stretch:  You will do this exercise on a table  Lie on your side with the injured hip on top  You may be told to keep a pillow between your thighs  Move the top leg so the foot hangs below the edge of the table  Rotate your hip to raise your foot in the opposite direction of the bottom shoulder  Raise your foot as high as you can so you feel a stretch in the back of your thigh  Hold for 5 seconds  Then slowly lower your foot to the starting position  · External hip rotator stretch:  You will do this exercise on a table  Lie on your side with the injured hip on the bottom  You do not need a pillow between your thighs for this exercise  Move the bottom leg so the foot is off the edge of the table  Rotate your hip to lift the foot in the opposite direction of the bottom shoulder  Raise your foot as high as you can so you feel a stretch in your buttock  Hold for 5 seconds  Then slowly lower your foot to the starting position  · Kneeling hip flexor stretch:  Kneel on your knee on the injured side  Place the foot of your other leg on the floor so the knee is bent  Put both hands on top of your thigh  Keep your back straight and abdominal muscles tight  Lean forward until you feel a stretch in your other thigh  Hold the stretch for 10 seconds  Return to the starting position         How to do hip strengthening exercises: Your healthcare provider or physical therapist will tell you how many times to do each exercise  Do the exercise on both sides before you move to the next exercise  · Straight leg lift to the side: This may also be called hip abduction  Lie on your side with straight legs, with the injured hip on top  Slowly raise your top leg toward the ceiling as high as you can  Keep your foot pointed  Hold for 5 seconds  Then slowly lower your leg to the starting position  · Inner thigh lift: This may also be called hip adduction  Lie on your side with straight legs, with the injured hip on the bottom  Cross your top leg over your bottom leg  Put the foot of your top leg on the floor in front of you  Raise your bottom leg until it touches the top leg  Hold for 5 seconds  Then slowly lower the leg to the floor  · Clam exercise:  Lie on your side so your injured side is on top  Bend your knees  Keep your heels together during this exercise  Slowly raise your top knee toward the ceiling  Then lower your leg so your knees are together  Call your doctor if:   · You have sharp pain during exercise or at rest     · You have questions or concerns about the stretches or exercises  © Copyright [employer] 2022 Information is for End User's use only and may not be sold, redistributed or otherwise used for commercial purposes  All illustrations and images included in CareNotes® are the copyrighted property of [employer]  or Yolanda Washington  The above information is an  only  It is not intended as medical advice for individual conditions or treatments  Talk to your doctor, nurse or pharmacist before following any medical regimen to see if it is safe and effective for you

## 2022-03-07 DIAGNOSIS — G89.29 CHRONIC PAIN OF RIGHT KNEE: ICD-10-CM

## 2022-03-07 DIAGNOSIS — M25.561 CHRONIC PAIN OF RIGHT KNEE: ICD-10-CM

## 2022-03-07 DIAGNOSIS — M16.11 PRIMARY OSTEOARTHRITIS OF ONE HIP, RIGHT: Primary | ICD-10-CM

## 2022-03-09 ENCOUNTER — EVALUATION (OUTPATIENT)
Dept: PHYSICAL THERAPY | Facility: CLINIC | Age: 72
End: 2022-03-09
Payer: COMMERCIAL

## 2022-03-09 DIAGNOSIS — G89.29 CHRONIC PAIN OF RIGHT KNEE: ICD-10-CM

## 2022-03-09 DIAGNOSIS — M16.11 PRIMARY OSTEOARTHRITIS OF RIGHT HIP: Primary | ICD-10-CM

## 2022-03-09 DIAGNOSIS — M25.561 CHRONIC PAIN OF RIGHT KNEE: ICD-10-CM

## 2022-03-09 PROCEDURE — 97162 PT EVAL MOD COMPLEX 30 MIN: CPT

## 2022-03-09 NOTE — PROGRESS NOTES
PT Evaluation     Today's date: 3/8/2022  Patient name: Carlos Avila  : 1950  MRN: [id number]  Referring provider: Jessie Diaz MD  Dx:   Encounter Diagnosis     ICD-10-CM    1  Primary osteoarthritis of right hip  M16 11    2  Chronic pain of right knee  M25 561     G89 29                   Assessment  Assessment details: Carlos Avila is a 80 y o  female presenting to outpatient physical therapy with noted impairments including pain, impaired soft tissue mobility, reduced range of motion, reduced strength, reduced postural awareness, and reduced activity tolerance  Signs and symptoms at present are consistent with referring diagnosis of OA R hip and chronic pain R knee    Due to noted impairments, the patient's present functional limitations include difficulty with ADLs with increased need for assistance, reliance on medication and/or modalities for pain relief, reduced tolerance for functional mobility and activity  Patient to benefit from skilled outpatient physical therapy 2x/week for 4 weeks in order to reduce pain, maximize pain free range of motion, increase strength and stability, and improve functional mobility/functional activity in order to maximize return to prior level of function with reduced limitations  Home exercise program was provided and all questions answered to patient's level of satisfaction  Thank you for your referral         Impairments: abnormal or restricted ROM, abnormal movement, activity intolerance, impaired physical strength and lacks appropriate home exercise program    Symptom irritability: moderateUnderstanding of Dx/Px/POC: good   Prognosis: good    Goals  STGs to be achieved in 4 weeks:  1  Pt to demonstrate reduced subjective pain rating "at worst" by at least 2-3 points from Initial Eval in order to allow for reduced pain with ADLs and improved functional activity tolerance     2  Pt to demonstrate increased AROM of R hip by at least 5-10 degrees in order to allow for greater ease and independence with ADLs and functional mobility  3  Pt to demonstrate increased MMT of R hio and knee by at least 1/2-1 grade in order to improve safety and stability with ADLs and functional mobility  LTGs to be achieved in 6-8 weeks:  1  Pt will be I with HEP in order to continue to improve quality of life and independence and reduce risk for re-injury  2  Pt to demonstrate return to entering and exiting van and tub without limitations or restrictions  3  Pt to demonstrate improved function as noted by achieving or exceeding predicted score on FOTO outcomes assessment tool  Plan  Patient would benefit from: skilled physical therapy  Other planned modality interventions: Modalities prn for symptom management  Planned therapy interventions: manual therapy, neuromuscular re-education, therapeutic activities, therapeutic exercise, strengthening, stretching and home exercise program  Frequency: 2x week  Duration in weeks: 4  Plan of Care beginning date: 3/9/2022  Plan of Care expiration date: 2022  Treatment plan discussed with: PTA and patient        Subjective Evaluation    History of Present Illness  Mechanism of injury: Pt reports a couple month hx of R hip and knee pain  Notes hip pain is also in R groin  R knee pain shoots up the R thigh  No pain at rest, usually only with wt bearing and it varies day to day  Takes tylenol on bad days for relief  Pt notes diff getting in tub, vehicle, diff walking when 1st getting up but pain decreases with distance  [place] chores are slower than they used to be with occ rest periods req'd  Climbing up stairs with FTF gait, descends with FOF gt            Recurrent probem    Quality of life: good    Pain  Current pain ratin  At best pain ratin  At worst pain ratin  Quality: sharp, dull ache and cramping (shooting)  Aggravating factors: stair climbing (certain movements)  Progression: no change    Social Support  Steps to enter house: yes  Stairs in house: yes   Lives in: multiple-level home  Lives with: spouse and adult children    Employment status: not working  Hand dominance: right      Diagnostic Tests  X-ray: abnormal  Treatments  Current treatment: physical therapy  Patient Goals  Patient goals for therapy: increased strength, increased motion, decreased pain and independence with ADLs/IADLs          Objective     Observations     Additional Observation Details  No obvious abnormalities, xrays positive for "bone on bone" R hip and Moderate crepitus R knee    Tenderness     Right Knee   Tenderness in the medial joint line and medial patella  Lumbar Screen  Lumbar range of motion within normal limits  Neurological Testing     Additional Neurological Details  No neuro changes RLE    Active Range of Motion     Right Hip   Flexion: 110 degrees   Abduction: 29 degrees   External rotation (90/90): 12 degrees   Internal rotation (90/90): 30 degrees     Right Knee   Flexion: 133 degrees   Extension: 0 degrees     Passive Range of Motion     Right Hip   Flexion: 115 degrees   Abduction: 35 degrees     Strength/Myotome Testing     Left Hip   Planes of Motion   Flexion: 4+    Right Hip   Planes of Motion   Flexion: 4  Extension: 4+  Abduction: 4    Right Knee   Flexion: 4  Extension: 4+    Ambulation     Observational Gait   Gait: asymmetric   Walking speed and stride length within functional limits       Additional Observational Gait Details  Amb indep w/o AD's with steady gait             Precautions: none    Re-eval Date: 4/9/22    Date  3/9       Visit Count 1       FOTO Comp 3/9       Pain In        Pain Out                Manuals 3/9                                       Neuro Re-Ed                                                                Ther Ex        Nustep        Heel slides        Sup abd/add        SAQs        SLRs        bridges        Standing 3 way SLR        Calf stretch        TR's/HR's        Standing march Ther Activity        Step ups        Mini squats        Gait Training                        Modalities

## 2022-03-10 PROBLEM — Z09 FOLLOW-UP EXAM, 3-6 MONTHS SINCE PREVIOUS EXAM: Status: ACTIVE | Noted: 2022-03-10

## 2022-03-10 NOTE — PROGRESS NOTES
Assessment/Plan:     Problem List Items Addressed This Visit        Cardiovascular and Mediastinum    Essential hypertension     · Has white coat syndrome  · Continue hydralazine, lisinopril, metoprolol  · Home blood pressure readings range from 113/70 to 140/80  · HR stable  · Patient is under significant stressors  · 3/11/2022  · Doing well with blood pressure at home  · Saw nephrology on 3/7/2022 with the following recommendations: "blood pressures at home are excellent in the 120-130 range  - Would not adjust medications based on office readings"            Other    Anxiety about health     · Patient under significant personal stress due to her cancer diagnoses  · Her  is also having health issues  · And her son is continuing to have significant heart issues after having covid  · Has a xanax prescription - not using  · Doing well without using the medication         Follow-up exam, 3-6 months since previous exam - Primary    Chronic hip pain, right     · Starting physical therapy          Chronic pain of right knee     · Starting physical therapy            Other Visit Diagnoses     Breast cancer screening by mammogram        At risk for decreased bone density              Subjective:      Patient ID: Jack Cabrera is a 80 y o  female  The patient is here for a follow up on her hypertension  The following portions of the patient's history were reviewed and updated as appropriate:     Past Medical History:  She has a past medical history of Colon polyps, Essential hypertension, Hematuria syndrome, Osteoarthritis, Proteinuria, and Vitamin D deficiency  ,  _______________________________________________________________________  Medical Problems:  does not have any pertinent problems on file ,  _______________________________________________________________________  Past Surgical History:   has a past surgical history that includes Rectal biopsy (N/A, 2/9/2018); Colonoscopy (12/2018);  Colonoscopy; Breast biopsy (Right, 2017); pr lap,diagnostic abdomen (N/A, 3/24/2020); Small intestine surgery (N/A, 4/29/2020); Mammo stereotactic breast biopsy right (all inc) (Right, 6/18/2020); and Mammo stereotactic breast biopsy left (all inc) (Left, 6/18/2020)  ,  _______________________________________________________________________  Family History:  family history includes Heart disease in her father; Hypertension in her mother; No Known Problems in her daughter, maternal aunt, maternal grandfather, maternal grandmother, paternal aunt, paternal aunt, paternal grandfather, and paternal grandmother; Pancreatic cancer in her brother ,  _______________________________________________________________________  Social History:   reports that she quit smoking about 37 years ago  She has never used smokeless tobacco  She reports previous alcohol use  She reports that she does not use drugs  ,  _______________________________________________________________________  Allergies:  has No Known Allergies     _______________________________________________________________________  Current Outpatient Medications   Medication Sig Dispense Refill    Cholecalciferol (Vitamin D3) 50 MCG (2000 UT) capsule Take 1 capsule (2,000 Units total) by mouth every morning      co-enzyme Q-10 50 MG capsule Take 1 capsule (50 mg total) by mouth every other day      fluticasone (FLONASE) 50 mcg/act nasal spray 1 spray into each nostril daily      hydrALAZINE (APRESOLINE) 50 mg tablet Take 1 tablet (50 mg total) by mouth 3 (three) times a day 270 tablet 3    lisinopril (ZESTRIL) 20 mg tablet Take 1 tablet (20 mg total) by mouth 2 (two) times a day 180 tablet 3    metoprolol tartrate (LOPRESSOR) 100 mg tablet Take 1 tablet by mouth twice daily 180 tablet 3    rosuvastatin (CRESTOR) 20 MG tablet Take 1 tablet (20 mg total) by mouth every other day 90 tablet 3    ALPRAZolam (XANAX) 0 25 mg tablet Take 1 tablet (0 25 mg total) by mouth daily at bedtime as needed for anxiety (Patient not taking: Reported on 3/1/2022 ) 60 tablet 0     Current Facility-Administered Medications   Medication Dose Route Frequency Provider Last Rate Last Admin    cephalexin (KEFLEX) capsule 500 mg  500 mg Oral Q6H Brisa Armenta MD         _______________________________________________________________________      Review of Systems   Constitutional: Negative for activity change, chills, fatigue and fever  HENT: Negative for rhinorrhea and sore throat  Eyes: Negative for pain  Respiratory: Negative for cough and shortness of breath  Cardiovascular: Negative for chest pain, palpitations and leg swelling  Gastrointestinal: Negative for abdominal pain, constipation, diarrhea, nausea and vomiting  Genitourinary: Negative for difficulty urinating, flank pain, frequency and urgency  Musculoskeletal: Positive for arthralgias, back pain, gait problem, joint swelling and myalgias  Skin: Negative for color change  Neurological: Negative for dizziness, weakness, light-headedness and headaches  Psychiatric/Behavioral: Negative for sleep disturbance  The patient is nervous/anxious  All other systems reviewed and are negative  Objective:  Vitals:    03/11/22 0841   BP: 150/90   BP Location: Left arm   Patient Position: Sitting   Cuff Size: Standard   Pulse: 90   Temp: (!) 97 4 °F (36 3 °C)   SpO2: 100%   Weight: 97 9 kg (215 lb 12 8 oz)   Height: 5' 8" (1 727 m)     Body mass index is 32 81 kg/m²  Physical Exam  Vitals reviewed  Constitutional:       General: She is awake  Appearance: Normal appearance  She is well-developed and normal weight  HENT:      Head: Normocephalic and atraumatic  Nose: Nose normal       Mouth/Throat:      Mouth: Mucous membranes are moist    Eyes:      Extraocular Movements: Extraocular movements intact  Cardiovascular:      Rate and Rhythm: Normal rate and regular rhythm  Pulses: Normal pulses        Heart sounds: Normal heart sounds  Pulmonary:      Effort: Pulmonary effort is normal       Breath sounds: Normal breath sounds  Abdominal:      General: Bowel sounds are normal       Palpations: Abdomen is soft  Musculoskeletal:         General: Normal range of motion  Cervical back: Normal range of motion  Right lower leg: No edema  Left lower leg: No edema  Comments: Chronic right hip and knee pains - starting physical therapy   Skin:     General: Skin is warm and dry  Neurological:      Mental Status: She is alert and oriented to person, place, and time  Psychiatric:         Attention and Perception: Attention normal          Mood and Affect: Mood normal          Speech: Speech normal          Behavior: Behavior normal  Behavior is cooperative

## 2022-03-10 NOTE — ASSESSMENT & PLAN NOTE
· Has white coat syndrome  · Continue hydralazine, lisinopril, metoprolol  · Home blood pressure readings range from 113/70 to 140/80  · HR stable  · Patient is under significant stressors  · 3/11/2022  · Doing well with blood pressure at home  · Saw nephrology on 3/7/2022 with the following recommendations: "blood pressures at home are excellent in the 120-130 range   - Would not adjust medications based on office readings"

## 2022-03-10 NOTE — ASSESSMENT & PLAN NOTE
· Patient under significant personal stress due to her cancer diagnoses  · Her  is also having health issues  · And her son is continuing to have significant heart issues after having covid    · Has a xanax prescription - not using  · Doing well without using the medication

## 2022-03-11 ENCOUNTER — OFFICE VISIT (OUTPATIENT)
Dept: INTERNAL MEDICINE CLINIC | Facility: CLINIC | Age: 72
End: 2022-03-11
Payer: COMMERCIAL

## 2022-03-11 VITALS
SYSTOLIC BLOOD PRESSURE: 150 MMHG | HEIGHT: 68 IN | WEIGHT: 215.8 LBS | BODY MASS INDEX: 32.71 KG/M2 | TEMPERATURE: 97.4 F | OXYGEN SATURATION: 100 % | DIASTOLIC BLOOD PRESSURE: 90 MMHG | HEART RATE: 90 BPM

## 2022-03-11 DIAGNOSIS — G89.29 CHRONIC PAIN OF RIGHT KNEE: ICD-10-CM

## 2022-03-11 DIAGNOSIS — Z09 FOLLOW-UP EXAM, 3-6 MONTHS SINCE PREVIOUS EXAM: Primary | ICD-10-CM

## 2022-03-11 DIAGNOSIS — M25.561 CHRONIC PAIN OF RIGHT KNEE: ICD-10-CM

## 2022-03-11 DIAGNOSIS — I10 ESSENTIAL HYPERTENSION: ICD-10-CM

## 2022-03-11 DIAGNOSIS — M25.551 CHRONIC HIP PAIN, RIGHT: ICD-10-CM

## 2022-03-11 DIAGNOSIS — G89.29 CHRONIC HIP PAIN, RIGHT: ICD-10-CM

## 2022-03-11 DIAGNOSIS — F41.8 ANXIETY ABOUT HEALTH: ICD-10-CM

## 2022-03-11 DIAGNOSIS — Z91.89 AT RISK FOR DECREASED BONE DENSITY: ICD-10-CM

## 2022-03-11 DIAGNOSIS — Z12.31 BREAST CANCER SCREENING BY MAMMOGRAM: ICD-10-CM

## 2022-03-11 PROCEDURE — 1036F TOBACCO NON-USER: CPT | Performed by: NURSE PRACTITIONER

## 2022-03-11 PROCEDURE — 3008F BODY MASS INDEX DOCD: CPT | Performed by: NURSE PRACTITIONER

## 2022-03-11 PROCEDURE — 1160F RVW MEDS BY RX/DR IN RCRD: CPT | Performed by: NURSE PRACTITIONER

## 2022-03-11 PROCEDURE — 3725F SCREEN DEPRESSION PERFORMED: CPT | Performed by: NURSE PRACTITIONER

## 2022-03-11 PROCEDURE — 99214 OFFICE O/P EST MOD 30 MIN: CPT | Performed by: NURSE PRACTITIONER

## 2022-03-11 RX ORDER — LISINOPRIL 20 MG/1
20 TABLET ORAL 2 TIMES DAILY
Qty: 180 TABLET | Refills: 3
Start: 2022-03-11 | End: 2022-04-15 | Stop reason: SDUPTHER

## 2022-03-11 NOTE — PATIENT INSTRUCTIONS
WE would like to take a minute to say thank you for choosing [facility] as your PCP  As a team [facility] and Bobbi Richardson are always trying to make our patients feel more comfortable and important    Because you are important to us! After your appointment is complete you'll receive a brief survey either by text or on your MyChart  If you could take a couple moments and let us know how we are doing, we would really appreciate it  If you're happy with your appointment let us know that too! Thank you! Please remember we are always here for you! We look forward to hearing from you! Stay healthy! Gretchen Jacobs and Bobbi Richardson  _______________________________________________________________    Problem List Items Addressed This Visit        Cardiovascular and Mediastinum    Essential hypertension     · Has white coat syndrome  · Continue hydralazine, lisinopril, metoprolol  · Home blood pressure readings range from 113/70 to 140/80  · HR stable  · Patient is under significant stressors  · 3/11/2022  · Doing well with blood pressure at home  · Saw nephrology on 3/7/2022 with the following recommendations: "blood pressures at home are excellent in the 120-130 range  - Would not adjust medications based on office readings"         Relevant Medications    lisinopril (ZESTRIL) 20 mg tablet       Other    Anxiety about health     · Patient under significant personal stress due to her cancer diagnoses  · Her  is also having health issues  · And her son is continuing to have significant heart issues after having covid    · Has a xanax prescription - not using  · Doing well without using the medication         Follow-up exam, 3-6 months since previous exam - Primary    Chronic hip pain, right     · Starting physical therapy          Chronic pain of right knee     · Starting physical therapy            Other Visit Diagnoses     Breast cancer screening by mammogram        Relevant Orders    Mammo screening bilateral w 3d & cad    At risk for decreased bone density        Relevant Orders    DXA bone density spine hip and pelvis

## 2022-03-14 ENCOUNTER — OFFICE VISIT (OUTPATIENT)
Dept: PHYSICAL THERAPY | Facility: CLINIC | Age: 72
End: 2022-03-14
Payer: COMMERCIAL

## 2022-03-14 DIAGNOSIS — G89.29 CHRONIC PAIN OF RIGHT KNEE: ICD-10-CM

## 2022-03-14 DIAGNOSIS — M16.11 PRIMARY OSTEOARTHRITIS OF RIGHT HIP: Primary | ICD-10-CM

## 2022-03-14 DIAGNOSIS — M25.561 CHRONIC PAIN OF RIGHT KNEE: ICD-10-CM

## 2022-03-14 PROCEDURE — 97110 THERAPEUTIC EXERCISES: CPT

## 2022-03-14 NOTE — PROGRESS NOTES
Daily Note     Today's date: 3/14/2022  Patient name: Sylvester Beal  : 1950  MRN: [id number]  Referring provider: Jay Connell MD  Dx:   Encounter Diagnosis     ICD-10-CM    1  Primary osteoarthritis of right hip  M16 11    2  Chronic pain of right knee  M25 561     G89 29                   Subjective:  Pt's only c/o is pain/discomfort @ R groin and prox  ant/med Quad region  Objective: See treatment diary below      Assessment: Tolerated treatment Fairly Well overall with performance of newly introduced exercises  Plan: Con't services 2x/week as per POC/Goals  Precautions: none    Re-eval Date: 22    Date  3/9 3 14 22      Visit Count 1 2      FOTO Comp 3/9       Pain In  "discomfort" @ R groin and R Prox  Ant/med Quad region        Pain Out  No worse              Manuals 3/9 3 14 22                                      Neuro Re-Ed                                                                Ther Ex  3 14 22      Nustep  **L1 x 10 min        Heel slides  ** R 20x        Sup abd/add  ** R 20x        SAQs  ** R 30x        SLRs  ** R 2x10        bridges  *Upcoming        Standing 3 way SLR  ** R 2x10      Calf stretch  ** R 4x30"        TR's/HR's  **  20x        Standing march  ** R 10x ea                      Ther Activity  3 14 22      Step ups  *upcoming      Mini squats  *upcoming      Gait Training                        Modalities

## 2022-03-16 ENCOUNTER — OFFICE VISIT (OUTPATIENT)
Dept: PHYSICAL THERAPY | Facility: CLINIC | Age: 72
End: 2022-03-16
Payer: COMMERCIAL

## 2022-03-16 DIAGNOSIS — M16.11 PRIMARY OSTEOARTHRITIS OF RIGHT HIP: Primary | ICD-10-CM

## 2022-03-16 PROCEDURE — 97110 THERAPEUTIC EXERCISES: CPT

## 2022-03-16 NOTE — PROGRESS NOTES
Daily Note     Today's date: 3/16/2022  Patient name: Jerrell Huynh  : 1950  MRN: [id number]  Referring provider: Mei Vazquez MD  Dx:   Encounter Diagnosis     ICD-10-CM    1  Primary osteoarthritis of right hip  M16 11                   Subjective:  Pt  reports on-going "discomfort" @ R prox  Ant /Med  Quad region  Objective: See treatment diary below      Assessment: Tolerated treatment Fairly Well to Well overall with performance of ther exer  Less discomfort and tightness by end of treatment session  Plan: Con't services 2x/week  Precautions: none    Re-eval Date: 22    Date  3/9 3 14 22 3 16 22     Visit Count 1 2 3     FOTO Comp 3/9       Pain In  "discomfort" @ R groin and R Prox  Ant/med Quad region  0/10 R hip    Discomfort"  R Prox  Ant/med Quad region       Pain Out  No worse Less sx             Manuals 3/9 3 14 22 3 16 22                                     Neuro Re-Ed                                                                Ther Ex  3 14 22 3 16 22     Nustep  **L1 x 10 min   L2 x 10 min     Heel slides  ** R 20x   R 30x     Sup abd/add  ** R 20x   R 30x     SAQs  ** R 30x   R 30x/5"     SLRs  ** R 2x10   R 2x15     bridges  *Upcoming   **3x5/3"     Standing 3 way SLR  ** R 2x10 R 2x10     Calf stretch  ** R 4x30"   R 4x30"     TR's/HR's  **  20x   B 30x     Standing march  ** R 10x ea R 30x ea                     Ther Activity  3 14 22 3 16 22     Step ups  *upcoming **Fwd 3x10       Mini squats  *upcoming **20x  1/4 way     Gait Training                        Modalities

## 2022-03-21 ENCOUNTER — OFFICE VISIT (OUTPATIENT)
Dept: PHYSICAL THERAPY | Facility: CLINIC | Age: 72
End: 2022-03-21
Payer: COMMERCIAL

## 2022-03-21 DIAGNOSIS — M16.11 PRIMARY OSTEOARTHRITIS OF RIGHT HIP: Primary | ICD-10-CM

## 2022-03-21 PROCEDURE — 97110 THERAPEUTIC EXERCISES: CPT

## 2022-03-21 NOTE — PROGRESS NOTES
Daily Note     Today's date: 3/21/2022  Patient name: Rodolfo Santos  : 1950  MRN: [id number]  Referring provider: Elizabeth Roe MD  Dx:   Encounter Diagnosis     ICD-10-CM    1  Primary osteoarthritis of right hip  M16 11                   Subjective:  Pt  reports she had no pain or sx over this past weekend  And currently she feels good re: R Hip/Quad/knee regions  Objective: See treatment diary below      Assessment: Tolerated treatment Fairly Well to Well overall with performance of ther exer and self stretching  Plan: Con't services 2x/week  Precautions: none    Re-eval Date: 22    Date  3/9 3 14 22 3 16 22 3 21 22    Visit Count 1 2 3 4    FOTO Comp 3/9       Pain In  "discomfort" @ R groin and R Prox  Ant/med Quad region  0/10 R hip    Discomfort"  R Prox  Ant/med Quad region   0/10    Pain Out  No worse Less sx 0/10            Manuals 3/9 3 14 22 3 16 22 3 21 22                                    Neuro Re-Ed                                                                Ther Ex  3 14 22 3 16 22 3 21 22    Nustep  **L1 x 10 min   L2 x 10 min L2 x 10 min    Heel slides  ** R 20x   R 30x R 30x    Sup abd/add  ** R 20x   R 30x R 30x    SAQs  ** R 30x   R 30x/5" R 40x/5"    SLRs  ** R 2x10   R 2x15 R 2x15    bridges  *Upcoming   **3x5/3" 3x5/3"    Standing 3 way SLR  ** R 2x10 R 2x10 R 3x10    Calf stretch  ** R 4x30"   R 4x30" R 5x30"  **Ham stretch  Long-seated 4x/30" R    TR's/HR's  **  20x   B 30x B 30x    Standing march  ** R 10x ea R 30x ea R 30x ea    Step-ups    **Fwd 20x              Ther Activity  3 14 22 3 16 22 3 21 22    Step ups  *upcoming **Fwd 3x10   Fwd 3x10    Mini squats  *upcoming **20x  1/4 way 20x  1/4 way    Gait Training                        Modalities

## 2022-03-23 ENCOUNTER — OFFICE VISIT (OUTPATIENT)
Dept: PHYSICAL THERAPY | Facility: CLINIC | Age: 72
End: 2022-03-23
Payer: COMMERCIAL

## 2022-03-23 DIAGNOSIS — M16.11 PRIMARY OSTEOARTHRITIS OF RIGHT HIP: Primary | ICD-10-CM

## 2022-03-23 PROCEDURE — 97530 THERAPEUTIC ACTIVITIES: CPT

## 2022-03-23 PROCEDURE — 97110 THERAPEUTIC EXERCISES: CPT

## 2022-03-23 NOTE — PROGRESS NOTES
Daily Note     Today's date: 3/23/2022  Patient name: Rodolfo Santos  : 1950  MRN: [id number]  Referring provider: Elizabeth Roe MD  Dx:   Encounter Diagnosis     ICD-10-CM    1  Primary osteoarthritis of right hip  M16 11                   Subjective:  "Just morning achiness", is pt's response to status of R hip and knee this morning  Objective: See treatment diary below      Assessment: Tolerated treatment Well overall with performance of ther exer and Ther Acts  Pt  Able to progress with select exercises  Plan:  Con't services 2x/week as per POC/Goals  Precautions: none    Re-eval Date: 22    Date  3/9 3 14 22 3 16 22 3 21 22 3 23 22   Visit Count 1 2 3 4 5   FOTO Comp 3/9       Pain In  "discomfort" @ R groin and R Prox  Ant/med Quad region  0/10 R hip    Discomfort"  R Prox  Ant/med Quad region   0/10 0/10  "morning achiness"   Pain Out  No worse Less sx 0/10 A little sore           Manuals 3/9 3 14 22 3 16 22 3 21 22 3 23 22                                   Neuro Re-Ed                                                                Ther Ex  3 14 22 3 16 22 3 21 22 3 23 22   Nustep  **L1 x 10 min   L2 x 10 min L2 x 10 min L3 x 10 min   Heel slides  ** R 20x   R 30x R 30x D/C to HEP   Sup abd/add  ** R 20x   R 30x R 30x D/C to HEP   SAQs  ** R 30x   R 30x/5" R 40x/5" R 40x/5"   SLRs  ** R 2x10   R 2x15 R 2x15 R 2x20   bridges  *Upcoming   **3x5/3" 3x5/3" 4x5/3"   Standing 3 way SLR  ** R 2x10 R 2x10 R 3x10 R 3x10 ea   Calf stretch  ** R 4x30"   R 4x30" R 5x30"  **Ham stretch  Long-seated 4x/30" R R 5x30"  **Ham stretch  Long-seated 4x/30" R   TR's/HR's  **  20x   B 30x B 30x B 40x   Standing march  ** R 10x ea R 30x ea R 30x ea R 40x ea                     Ther Activity  3 14 22 3 16 22 3 21 22 3 23 22   Step ups  *upcoming **Fwd 3x10   Fwd 3x10 Fwd 4x10   Mini squats  *upcoming **20x  1/4 way 20x  1/4 way 25x  1/4 way   Gait Training                        Modalities

## 2022-03-28 ENCOUNTER — OFFICE VISIT (OUTPATIENT)
Dept: PHYSICAL THERAPY | Facility: CLINIC | Age: 72
End: 2022-03-28
Payer: COMMERCIAL

## 2022-03-28 DIAGNOSIS — M25.561 CHRONIC PAIN OF RIGHT KNEE: ICD-10-CM

## 2022-03-28 DIAGNOSIS — G89.29 CHRONIC PAIN OF RIGHT KNEE: ICD-10-CM

## 2022-03-28 DIAGNOSIS — M16.11 PRIMARY OSTEOARTHRITIS OF RIGHT HIP: Primary | ICD-10-CM

## 2022-03-28 PROCEDURE — 97110 THERAPEUTIC EXERCISES: CPT

## 2022-03-28 NOTE — PROGRESS NOTES
Daily Note     Today's date: 3/28/2022  Patient name: Lianet Girard  : 1950  MRN: [id number]  Referring provider: Yina Uribe MD  Dx:   Encounter Diagnosis     ICD-10-CM    1  Primary osteoarthritis of right hip  M16 11    2  Chronic pain of right knee  M25 561     G89 29                   Subjective: Pt notes she can now climb stairs FOF, walked at [employer] and then went shopping yesterday  Was sore at end of day  Objective: See treatment diary below      Assessment: Tolerated treatment well  Patient exhibited good technique with therapeutic exercises and would benefit from continued PT    Pt making good gains in strength and functional mobility  Plan: Continue per plan of care        Precautions: none    Re-eval Date: 22    Date  3/28       Visit Count 6       FOTO Comp 3/28       Pain In   1/10 R hip       Pain Out                Manuals 3/28    3 23 22                                   Neuro Re-Ed                                                                Ther Ex 3/28    3 23 22   Nustep L3  10 min    L3 x 10 min   Heel slides HEP    D/C to HEP   Sup abd/add HEP    D/C to HEP   SAQs 2#  30x    R 40x/5"   SLRs 3 x 10    R 2x20   bridges 3 x 10    4x5/3"   Standing 3 way SLR 2#  30 ea    R 3x10 ea   Calf stretch B 5 x 30"    R 5x30"  **Ham stretch  Long-seated 4x/30" R   TR's/HR's B 40x    B 40x   Standing march 2#  30x    R 40x ea   Leg press 50#  30x               Ther Activity     3 23 22   Step ups Fwd 40    Fwd 4x10   Mini squats 25x    25x  1/4 way   Gait Training                        Modalities

## 2022-03-30 ENCOUNTER — OFFICE VISIT (OUTPATIENT)
Dept: PHYSICAL THERAPY | Facility: CLINIC | Age: 72
End: 2022-03-30
Payer: COMMERCIAL

## 2022-03-30 DIAGNOSIS — M16.11 PRIMARY OSTEOARTHRITIS OF RIGHT HIP: Primary | ICD-10-CM

## 2022-03-30 PROCEDURE — 97110 THERAPEUTIC EXERCISES: CPT

## 2022-03-30 PROCEDURE — 97112 NEUROMUSCULAR REEDUCATION: CPT

## 2022-03-30 NOTE — PROGRESS NOTES
Daily Note     Today's date: 3/30/2022  Patient name: Vanessa Kahn  : 1950  MRN: [id number]  Referring provider: Otto Scruggs MD  Dx:   Encounter Diagnosis     ICD-10-CM    1  Primary osteoarthritis of right hip  M16 11                   Subjective:  Pt  denies any pain @ R hip, but does note "a little bit" of pain/sx @ R knee  Objective: See treatment diary below      Assessment: Tolerated treatment Fairly Well to Well overall with performance of ther exer and newly introduced NM Re-Ed Acts  Plan:  Con't services 2x/week           Precautions: none    Re-eval Date: 22    Date  3/28 3 30 22      Visit Count 6 7      FOTO Comp 3/28       Pain In   1/10 R hip 0/10 R hip    "a little bit" R knee      Pain Out  0/10              Manuals 3/28 3 30 22   3 23 22                                   Neuro Re-Ed  3 30 22      Wobble Board  **F/B 30x  **Side-side 30x      TR/HR on AeroMat  **B 30x      Side step  On AeroMat  **4 trials                                      Ther Ex 3/28 3 30 22   3 23 22   Nustep L3  10 min L3  10 min     L3 x 10 min   Heel slides HEP    D/C to HEP   Sup abd/add HEP    D/C to HEP   SAQs 2#  30x 2#  30x     R 40x/5"   SLRs 3 x 10 3 x 10     R 2x20   bridges 3 x 10 3 x 10/3"     4x5/3"   Standing 3 way SLR 2#  30 ea 2#  30 ea   R 3x10 ea   Calf stretch B 5 x 30" B 5 x 30"    **Ham Stretch R 5x/30"   R 5x30"  **Ham stretch  Long-seated 4x/30" R   TR's/HR's B 40x B 30x  *On AeroMat   B 40x   Standing march 2#  30x 2#  30x   R 40x ea   Leg press 50#  30x 50#  40x                Ther Activity  3 30 22   3 23 22   Step ups Fwd 40 Fwd 40x     Fwd 4x10   Mini squats 25x 30x   25x  1/4 way   Gait Training                        Modalities

## 2022-04-04 ENCOUNTER — OFFICE VISIT (OUTPATIENT)
Dept: PHYSICAL THERAPY | Facility: CLINIC | Age: 72
End: 2022-04-04
Payer: COMMERCIAL

## 2022-04-04 DIAGNOSIS — M16.11 PRIMARY OSTEOARTHRITIS OF RIGHT HIP: Primary | ICD-10-CM

## 2022-04-04 PROCEDURE — 97110 THERAPEUTIC EXERCISES: CPT

## 2022-04-04 PROCEDURE — 97112 NEUROMUSCULAR REEDUCATION: CPT

## 2022-04-04 NOTE — PROGRESS NOTES
Daily Note     Today's date: 2022  Patient name: Valerie Velez  : 1950  MRN: [id number]  Referring provider: Ravi Swenson MD  Dx:   Encounter Diagnosis     ICD-10-CM    1  Primary osteoarthritis of right hip  M16 11                   Subjective:  Pt  denies any pain @ R hip  Does states however her R knee has a little bit of pain, which she attributes to "stepping on it wrong" this past weekend, when she went to step down off a stair onto concrete  Says she may have twisted it  Objective: See treatment diary below      Assessment: Tolerated treatment Well overall with performance of ther exer, NM Re-Ed Acts, and self stretching  Sx @ R knee "felt better" by end of treatment session  Plan: Con't services as per POC/Goals           Precautions: none    Re-eval Date: 22    Date  3/28 3 30 22 4 4 22     Visit Count 6 7 8     FOTO Comp 3/28       Pain In   1/10 R hip 0/10 R hip    "a little bit" R knee 0/10 R hip    "a little bit of pain" R knee     Pain Out  0/10              Manuals 3/28 3 30 22 4 4 22  3 23 22                                   Neuro Re-Ed  3 30 22 4 4 22     Wobble Board  **F/B 30x  **Side-side 30x F/B 30x  Side-side 30x     TR/HR on AeroMat  **B 30x B 30x     Side step  On AeroMat  **4 trials 4 trials                                     Ther Ex 3/28 3 30 22 4 4 22  3 23 22   Nustep L3  10 min L3  10 min   L4  10 min  L3 x 10 min   Heel slides HEP    D/C to HEP   Sup abd/add HEP    D/C to HEP   SAQs 2#  30x 2#  30x   2#  40x  R 40x/5"   SLRs 3 x 10 3 x 10   *STAND  R 2x20   bridges 3 x 10 3 x 10/3"   3 x 10/3"  4x5/3"   Standing 3 way SLR 2#  30 ea 2#  30 ea 2#  30 ea  R 3x10 ea   Calf stretch B 5 x 30" B 5 x 30"    **Ham Stretch R 5x/30" B 5 x 30"    Ham Stretch R 5x/30  R 5x30"  **Ham stretch  Long-seated 4x/30" R   TR's/HR's B 40x B 30x  *On AeroMat B 40x  *On AeroMat  B 40x   Standing march 2#  30x 2#  30x 2#  40x  R 40x ea   Leg press 50#  30x 50#  40x   50#  40x Ther Activity  3 30 22 4 4 22  3 23 22   Step ups Fwd 40 Fwd 40x   Fwd 45x  Fwd 4x10   Mini squats 25x 30x 30x  25x  1/4 way   Gait Training                        Modalities

## 2022-04-06 ENCOUNTER — EVALUATION (OUTPATIENT)
Dept: PHYSICAL THERAPY | Facility: CLINIC | Age: 72
End: 2022-04-06
Payer: COMMERCIAL

## 2022-04-06 DIAGNOSIS — G89.29 CHRONIC PAIN OF RIGHT KNEE: ICD-10-CM

## 2022-04-06 DIAGNOSIS — M25.561 CHRONIC PAIN OF RIGHT KNEE: ICD-10-CM

## 2022-04-06 DIAGNOSIS — M16.11 PRIMARY OSTEOARTHRITIS OF RIGHT HIP: Primary | ICD-10-CM

## 2022-04-06 PROCEDURE — 97110 THERAPEUTIC EXERCISES: CPT

## 2022-04-06 PROCEDURE — 97140 MANUAL THERAPY 1/> REGIONS: CPT

## 2022-04-06 PROCEDURE — 97112 NEUROMUSCULAR REEDUCATION: CPT

## 2022-04-06 NOTE — PROGRESS NOTES
PT Evaluation     Today's date: 22  Patient name: Max Madera  : 1950  MRN: [id number]  Referring provider: Brendan Mcnamara MD  Dx:   Encounter Diagnosis     ICD-10-CM    1  Primary osteoarthritis of right hip  M16 11    2  Chronic pain of right knee  M25 561     G89 29                   Assessment  Assessment details: Max Madera is a 80 y o  female presenting to outpatient physical therapy with noted impairments including pain, impaired soft tissue mobility, reduced range of motion, reduced strength, reduced postural awareness, and reduced activity tolerance  Signs and symptoms at present are consistent with referring diagnosis of OA R hip and chronic pain R knee    Due to noted impairments, the patient's present functional limitations include difficulty with ADLs with increased need for assistance, reliance on medication and/or modalities for pain relief, reduced tolerance for functional mobility and activity  Patient to benefit from skilled outpatient physical therapy 2x/week for 4 weeks in order to reduce pain, maximize pain free range of motion, increase strength and stability, and improve functional mobility/functional activity in order to maximize return to prior level of function with reduced limitations  Home exercise program was provided and all questions answered to patient's level of satisfaction  Thank you for your referral   22 UPDATE:    Pt notes feeling about 85% improved from [facility]  Pt notes she can step up the stairs FOF, has improved ability to walk and is able to enter/exit vehicle w/o assisting the R leg  Continues to have some pain in R hip and knee , but nothing like it was  Is completing [place] chores as needed  Able to get up from floor indep pulling up on furniture  Pt is pleased with her progress and will return for 1 more visit prior to doctor apptmt, then DC to HEP      Impairments: abnormal or restricted ROM, abnormal movement, activity intolerance, impaired physical strength and lacks appropriate home exercise program    Symptom irritability: moderateUnderstanding of Dx/Px/POC: good   Prognosis: good    Goals  STGs to be achieved in 4 weeks:  1  Pt to demonstrate reduced subjective pain rating "at worst" by at least 2-3 points from Initial Eval in order to allow for reduced pain with ADLs and improved functional activity tolerance  MET  2  Pt to demonstrate increased AROM of R hip by at least 5-10 degrees in order to allow for greater ease and independence with ADLs and functional mobility  MET  3  Pt to demonstrate increased MMT of R hio and knee by at least 1/2-1 grade in order to improve safety and stability with ADLs and functional mobility  MET    LTGs to be achieved in 6-8 weeks:  1  Pt will be I with HEP in order to continue to improve quality of life and independence and reduce risk for re-injury  2  Pt to demonstrate return to entering and exiting van and tub without limitations or restrictions  3  Pt to demonstrate improved function as noted by achieving or exceeding predicted score on FOTO outcomes assessment tool  Plan  Plan details: DC after next visit  Patient would benefit from: skilled physical therapy  Other planned modality interventions: Modalities prn for symptom management  Planned therapy interventions: manual therapy, neuromuscular re-education, therapeutic activities, therapeutic exercise, strengthening, stretching and home exercise program  Frequency: 2x week  Duration in weeks: 4  Plan of Care beginning date: 4/6/2022  Plan of Care expiration date: 4/13/2022  Treatment plan discussed with: PTA and patient        Subjective Evaluation    History of Present Illness  Mechanism of injury: Pt reports a couple month hx of R hip and knee pain  Notes hip pain is also in R groin  R knee pain shoots up the R thigh  No pain at rest, usually only with wt bearing and it varies day to day  Takes tylenol on bad days for relief  Pt notes diff getting in tub, vehicle, diff walking when 1st getting up but pain decreases with distance  [place] chores are slower than they used to be with occ rest periods req'd  Climbing up stairs with FTF gait, descends with FOF gt   22 UPDATE:  Pt notes feeling about 85% improved from [facility]  Pt notes she can step up the stairs FOF, has improved ability to walk and is able to enter/exit vehicle w/o assisting the R leg  Continues to have some pain in R hip and knee , but nothing like it was  Is completing [place] chores as needed  Able to get up from floor indep pulling up on furniture  Recurrent probem    Quality of life: good    Pain  Current pain ratin (R groin)  At best pain ratin  At worst pain ratin  Quality: sharp, dull ache and cramping (shooting)  Aggravating factors: stair climbing (certain movements)  Progression: no change    Social Support  Steps to enter house: yes  Stairs in house: yes   Lives in: multiple-level home  Lives with: spouse and adult children    Employment status: not working  Hand dominance: right      Diagnostic Tests  X-ray: abnormal  Treatments  Current treatment: physical therapy  Patient Goals  Patient goals for therapy: increased strength, increased motion, decreased pain and independence with ADLs/IADLs          Objective     Observations     Additional Observation Details  No obvious abnormalities, xrays positive for "bone on bone" R hip and Moderate crepitus R knee    Tenderness     Right Knee   Tenderness in the medial joint line and medial patella  Lumbar Screen  Lumbar range of motion within normal limits      Neurological Testing     Additional Neurological Details  No neuro changes RLE    Active Range of Motion     Right Hip   Flexion: 120 degrees   Abduction: 55 degrees   External rotation (90/90): 25 degrees   Internal rotation (90/90): 39 degrees     Right Knee   Flexion: 133 degrees   Extension: 0 degrees     Passive Range of Motion     Right Hip   Flexion: 125 degrees Abduction: 42 degrees     Strength/Myotome Testing     Left Hip   Planes of Motion   Flexion: 4+    Right Hip   Planes of Motion   Flexion: 4+  Extension: 5  Abduction: 4+    Right Knee   Flexion: 5  Extension: 5    Ambulation     Observational Gait   Gait: asymmetric   Walking speed and stride length within functional limits       Additional Observational Gait Details  Amb indep w/o AD's with steady gait             Precautions: none    Re-eval Date: 4/9/22      Date  3/28 3 30 22 4 4 22 4/6    Visit Count 6 7 8 9    FOTO Comp 3/28       Pain In   1/10 R hip 0/10 R hip    "a little bit" R knee 0/10 R hip    "a little bit of pain" R knee  4/10    Pain Out  0/10              Manuals 3/28 3 30 22 4 4 22 4/6    ROM/strength check    10'                            Neuro Re-Ed  3 30 22 4 4 22 4/6    Wobble Board  **F/B 30x  **Side-side 30x F/B 30x  Side-side 30x  F/B  30x   side -side    30x    TR/HR on AeroMat  **B 30x B 30x B 40x    Side step  On AeroMat  **4 trials 4 trials 5 laps                                    Ther Ex 3/28 3 30 22 4 4 22    4/6    Nustep L3  10 min L3  10 min   L4  10 min L4  10'    Heel slides HEP       Sup abd/add HEP       SAQs 2#  30x 2#  30x   2#  40x 2#  45x    SLRs 3 x 10 3 x 10   *STAND DC to standing    bridges 3 x 10 3 x 10/3"   3 x 10/3" 3 x 10/5"    Standing 3 way SLR 2#  30 ea 2#  30 ea 2#  30 ea 2#  30x ea    Calf stretch B 5 x 30" B 5 x 30"    **Ham Stretch R 5x/30" B 5 x 30"    Ham Stretch R 5x/30 B  5 x 30"    TR's/HR's B 40x B 30x  *On AeroMat B 40x  *On AeroMat    B 40x   On aeromat    Standing march 2#  30x 2#  30x 2#  40x    2#  40x    Leg press 50#  30x 50#  40x   50#  40x   60#  40x            Ther Activity  3 30 22 4 4 22    4/6    Step ups Fwd 40 Fwd 40x   Fwd 45x Fwd 45x    Mini squats 25x 30x 30x 30x    Gait Training                        Modalities

## 2022-04-11 ENCOUNTER — OFFICE VISIT (OUTPATIENT)
Dept: PHYSICAL THERAPY | Facility: CLINIC | Age: 72
End: 2022-04-11
Payer: COMMERCIAL

## 2022-04-11 DIAGNOSIS — M16.11 PRIMARY OSTEOARTHRITIS OF RIGHT HIP: Primary | ICD-10-CM

## 2022-04-11 PROCEDURE — 97110 THERAPEUTIC EXERCISES: CPT

## 2022-04-11 PROCEDURE — 97112 NEUROMUSCULAR REEDUCATION: CPT

## 2022-04-11 NOTE — PROGRESS NOTES
Daily Note     Today's date: 2022  Patient name: Pawel Haynes  : 1950  MRN: [id number]  Referring provider: Joss Howard MD  Dx:   Encounter Diagnosis     ICD-10-CM    1  Primary osteoarthritis of right hip  M16 11                   Subjective:  No complaints or concerns voiced by pt today re: status of  R hip/LE  Anticipating upcoming follow-up visit w/Ortho MD end of this week  Objective: See treatment diary below      Assessment: Tolerated treatment Well overall with performance of ther exer  Pt  has progressed consistently to date  Plan:  Con't services as per POC/Goals          Precautions: none    Re-eval Date: 22      Date  3/28 3 30 22 4 4 22 /6 4 11 22   Visit Count 6 7 8 9 10   FOTO Comp 3/28       Pain In   1/10 R hip 0/10 R hip    "a little bit" R knee 0/10 R hip    "a little bit of pain" R knee  4/10 0/10   Pain Out  010   0/10           Manuals 3/28 3 30 22 4 4 22 4/6 4 11 22   ROM/strength check    10'                            Neuro Re-Ed  3 30 22 4 4 22 4/6 4 11 22   Wobble Board  **F/B 30x  **Side-side 30x F/B 30x  Side-side 30x  F/B  30x   side -side    30x  F/B  40x   side -side    40x   TR/HR on AeroMat  **B 30x B 30x B 40x B 40x   Side step  On AeroMat  **4 trials 4 trials 5 laps 5 laps                                   Ther Ex 3/28 3 30 22 4 4 22    4/6 4 11 22   Nustep L3  10 min L3  10 min   L4  10 min L4  10' L4  10'   Heel slides HEP       Sup abd/add HEP       SAQs 2#  30x 2#  30x   2#  40x 2#  45x D/C to HEP   SLRs 3 x 10 3 x 10   *STAND DC to standing *STAND  R 2# 3x10   bridges 3 x 10 3 x 10/3"   3 x 10/3" 3 x 10/5" 3 x 10/5"   Standing 3 way SLR 2#  30 ea 2#  30 ea 2#  30 ea 2#  30x ea 2#  3x10 ea   Calf stretch B 5 x 30" B 5 x 30"    **Ham Stretch R 5x/30" B 5 x 30"    Ham Stretch R 5x/30 B  5 x 30" B 6 x 30"    Ham Stretch R 6x/30   TR's/HR's B 40x B 30x  *On AeroMat B 40x  *On AeroMat    B 40x   On aeromat B 45x   On aeromat   Standing # 30x 2#  30x 2#  40x    2#  40x 2#  45x   Leg press 50#  30x 50#  40x   50#  40x   60#  40x 60#  40x        **Leg Ext on Mach 11# 3x10    **Leg  Curls on Mach 22# 3x10   Ther Activity  3 30 22 4 4 22    4/6 4 11 22   Step ups Fwd 40 Fwd 40x   Fwd 45x Fwd 45x Fwd 45x  **Lat 20x   Mini squats 25x 30x 30x 30x 40x   Gait Training                        Modalities

## 2022-04-15 ENCOUNTER — OFFICE VISIT (OUTPATIENT)
Dept: OBGYN CLINIC | Facility: CLINIC | Age: 72
End: 2022-04-15
Payer: COMMERCIAL

## 2022-04-15 VITALS
HEIGHT: 68 IN | BODY MASS INDEX: 32.74 KG/M2 | SYSTOLIC BLOOD PRESSURE: 204 MMHG | WEIGHT: 216 LBS | DIASTOLIC BLOOD PRESSURE: 115 MMHG | HEART RATE: 93 BPM

## 2022-04-15 DIAGNOSIS — G89.29 HIP PAIN, CHRONIC, RIGHT: ICD-10-CM

## 2022-04-15 DIAGNOSIS — G89.29 CHRONIC PAIN OF RIGHT KNEE: ICD-10-CM

## 2022-04-15 DIAGNOSIS — M25.551 HIP PAIN, CHRONIC, RIGHT: ICD-10-CM

## 2022-04-15 DIAGNOSIS — I10 ESSENTIAL HYPERTENSION: ICD-10-CM

## 2022-04-15 DIAGNOSIS — M16.11 PRIMARY OSTEOARTHRITIS OF ONE HIP, RIGHT: Primary | ICD-10-CM

## 2022-04-15 DIAGNOSIS — M25.561 CHRONIC PAIN OF RIGHT KNEE: ICD-10-CM

## 2022-04-15 PROCEDURE — 1160F RVW MEDS BY RX/DR IN RCRD: CPT | Performed by: FAMILY MEDICINE

## 2022-04-15 PROCEDURE — 1036F TOBACCO NON-USER: CPT | Performed by: FAMILY MEDICINE

## 2022-04-15 PROCEDURE — 3008F BODY MASS INDEX DOCD: CPT | Performed by: FAMILY MEDICINE

## 2022-04-15 PROCEDURE — 99213 OFFICE O/P EST LOW 20 MIN: CPT | Performed by: FAMILY MEDICINE

## 2022-04-15 PROCEDURE — 3080F DIAST BP >= 90 MM HG: CPT | Performed by: FAMILY MEDICINE

## 2022-04-15 PROCEDURE — 3077F SYST BP >= 140 MM HG: CPT | Performed by: FAMILY MEDICINE

## 2022-04-15 RX ORDER — LISINOPRIL 20 MG/1
20 TABLET ORAL 2 TIMES DAILY
Qty: 180 TABLET | Refills: 3
Start: 2022-04-15 | End: 2022-04-19 | Stop reason: SDUPTHER

## 2022-04-15 NOTE — PROGRESS NOTES
[facility] [place] [address]  [facility] [address]  [phone]      Chief Complaint:  Chief Complaint   Patient presents with    Right Hip - Follow-up       Vitals:  BP (!) 204/115 (BP Location: Right arm, Patient Position: Sitting, Cuff Size: Standard) Comment: Took bp meds this am  Pulse 93   Ht 5' 8" (1 727 m)   Wt 98 kg (216 lb)   BMI 32 84 kg/m²     The following portions of the patient's history were reviewed and updated as appropriate: allergies, current medications, past family history, past medical history, past social history, past surgical history, and problem list       Subjective:   Patient ID: Eden Obando is a 80 y o  female  Here for f/u R hip/knee pain  Doing much better- less pain  Going to PT  No swelling/Locking/giving out  No pain meds needed  Review of Systems   Constitutional: Negative for fatigue and fever  Respiratory: Negative for shortness of breath  Cardiovascular: Negative for chest pain  Gastrointestinal: Negative for abdominal pain and nausea  Genitourinary: Negative for dysuria  Musculoskeletal: Positive for arthralgias  Skin: Negative for rash and wound  Neurological: Negative for weakness and headaches  Objective:  Right Knee Exam   Right knee exam is normal     Muscle Strength   The patient has normal right knee strength  Tenderness   The patient is experiencing no tenderness  Range of Motion   The patient has normal right knee ROM  Right Hip Exam     Tenderness   The patient is experiencing no tenderness  Range of Motion   The patient has normal right hip ROM  Muscle Strength   The patient has normal right hip strength  Comments:  Pain with int/ext rotation            Physical Exam  Constitutional:       Appearance: Normal appearance  She is normal weight  HENT:      Head: Normocephalic  Eyes:      Extraocular Movements: Extraocular movements intact  Pulmonary:      Effort: Pulmonary effort is normal    Musculoskeletal:      Cervical back: Normal range of motion  Skin:     General: Skin is warm and dry  Neurological:      General: No focal deficit present  Mental Status: She is alert and oriented to person, place, and time  Mental status is at baseline  Psychiatric:         Mood and Affect: Mood normal          Behavior: Behavior normal          Thought Content: Thought content normal          Judgment: Judgment normal                Assessment/Plan:  Assessment/Plan   Diagnoses and all orders for this visit:    Primary osteoarthritis of one hip, right    Chronic pain of right knee    Hip pain, chronic, right        Return if symptoms worsen or fail to improve       Lex Harrison MD

## 2022-04-19 DIAGNOSIS — I10 ESSENTIAL HYPERTENSION: ICD-10-CM

## 2022-04-19 RX ORDER — LISINOPRIL 20 MG/1
20 TABLET ORAL 2 TIMES DAILY
Qty: 180 TABLET | Refills: 3 | Status: SHIPPED | OUTPATIENT
Start: 2022-04-19

## 2022-04-29 ENCOUNTER — VBI (OUTPATIENT)
Dept: ADMINISTRATIVE | Facility: OTHER | Age: 72
End: 2022-04-29

## 2022-05-11 ENCOUNTER — HOSPITAL ENCOUNTER (OUTPATIENT)
Dept: MAMMOGRAPHY | Facility: HOSPITAL | Age: 72
Discharge: HOME | End: 2022-05-11
Payer: COMMERCIAL

## 2022-05-11 ENCOUNTER — HOSPITAL ENCOUNTER (OUTPATIENT)
Dept: BONE DENSITY | Facility: HOSPITAL | Age: 72
Discharge: HOME | End: 2022-05-11
Payer: COMMERCIAL

## 2022-05-11 VITALS — WEIGHT: 215 LBS | HEIGHT: 68 IN | BODY MASS INDEX: 32.58 KG/M2

## 2022-05-11 DIAGNOSIS — Z12.31 BREAST CANCER SCREENING BY MAMMOGRAM: ICD-10-CM

## 2022-05-11 DIAGNOSIS — Z91.89 AT RISK FOR DECREASED BONE DENSITY: ICD-10-CM

## 2022-05-11 PROCEDURE — 77067 SCR MAMMO BI INCL CAD: CPT

## 2022-05-11 PROCEDURE — 77063 BREAST TOMOSYNTHESIS BI: CPT

## 2022-05-11 PROCEDURE — 77080 DXA BONE DENSITY AXIAL: CPT

## 2022-06-03 ENCOUNTER — TELEPHONE (OUTPATIENT)
Dept: SURGICAL ONCOLOGY | Facility: CLINIC | Age: 72
End: 2022-06-03

## 2022-06-03 NOTE — TELEPHONE ENCOUNTER
Called and left message for patient to reschedule appt with Dr Marie on 6/23 due Dr Marie being in the OR  Left call back number and asked her to reschedule to 6/28

## 2022-06-06 ENCOUNTER — TELEPHONE (OUTPATIENT)
Dept: HEMATOLOGY ONCOLOGY | Facility: CLINIC | Age: 72
End: 2022-06-06

## 2022-06-06 NOTE — TELEPHONE ENCOUNTER
Appointment Cancellation Or Reschedule     Person calling in Patient    Provider Dr Concepcion April   Office Visit Date and Time 6/23 9:15AM   Office Visit Location [facility]   Did patient want to reschedule their office appointment? If so, when was it scheduled to? Yes, 6/28 8:45AM   Is this patient calling to reschedule an infusion appointment? no   When is their next infusion appointment? no   Is this patient a Chemo patient? no   Reason for Cancellation or Reschedule Provider not available     If the patient is a treatment patient, please route this to the office nurse  If the patient is not on treatment, please route to the office MA

## 2022-06-09 ENCOUNTER — APPOINTMENT (OUTPATIENT)
Dept: LAB | Facility: CLINIC | Age: 72
End: 2022-06-09
Payer: COMMERCIAL

## 2022-06-09 DIAGNOSIS — Z85.9 HISTORY OF MALIGNANT NEUROENDOCRINE TUMOR: ICD-10-CM

## 2022-06-09 LAB
BUN SERPL-MCNC: 11 MG/DL (ref 5–25)
CREAT SERPL-MCNC: 1.04 MG/DL (ref 0.6–1.3)
GFR SERPL CREATININE-BSD FRML MDRD: 54 ML/MIN/1.73SQ M

## 2022-06-09 PROCEDURE — 86316 IMMUNOASSAY TUMOR OTHER: CPT

## 2022-06-09 PROCEDURE — 82565 ASSAY OF CREATININE: CPT

## 2022-06-09 PROCEDURE — 36415 COLL VENOUS BLD VENIPUNCTURE: CPT

## 2022-06-09 PROCEDURE — 84520 ASSAY OF UREA NITROGEN: CPT

## 2022-06-14 LAB — CGA SERPL-MCNC: 70.8 NG/ML (ref 0–101.8)

## 2022-06-17 ENCOUNTER — HOSPITAL ENCOUNTER (OUTPATIENT)
Dept: CT IMAGING | Facility: HOSPITAL | Age: 72
Discharge: HOME | End: 2022-06-17
Payer: COMMERCIAL

## 2022-06-17 DIAGNOSIS — Z85.9 HISTORY OF MALIGNANT NEUROENDOCRINE TUMOR: ICD-10-CM

## 2022-06-17 PROCEDURE — 74177 CT ABD & PELVIS W/CONTRAST: CPT

## 2022-06-17 PROCEDURE — G1004 CDSM NDSC: HCPCS

## 2022-06-17 RX ADMIN — IOHEXOL 65 ML: 300 INJECTION, SOLUTION INTRAVENOUS at 08:53

## 2022-06-28 ENCOUNTER — OFFICE VISIT (OUTPATIENT)
Dept: SURGICAL ONCOLOGY | Facility: CLINIC | Age: 72
End: 2022-06-28
Payer: COMMERCIAL

## 2022-06-28 VITALS
WEIGHT: 216 LBS | DIASTOLIC BLOOD PRESSURE: 98 MMHG | OXYGEN SATURATION: 97 % | RESPIRATION RATE: 18 BRPM | TEMPERATURE: 96.8 F | BODY MASS INDEX: 32.74 KG/M2 | SYSTOLIC BLOOD PRESSURE: 142 MMHG | HEIGHT: 68 IN | HEART RATE: 63 BPM

## 2022-06-28 DIAGNOSIS — Z08 ENCOUNTER FOR FOLLOW-UP EXAMINATION AFTER COMPLETED TREATMENT FOR MALIGNANT NEOPLASM: Primary | ICD-10-CM

## 2022-06-28 DIAGNOSIS — Z85.9 HISTORY OF MALIGNANT NEUROENDOCRINE TUMOR: ICD-10-CM

## 2022-06-28 PROCEDURE — 1160F RVW MEDS BY RX/DR IN RCRD: CPT | Performed by: SURGERY

## 2022-06-28 PROCEDURE — 99214 OFFICE O/P EST MOD 30 MIN: CPT | Performed by: SURGERY

## 2022-06-28 PROCEDURE — 3077F SYST BP >= 140 MM HG: CPT | Performed by: SURGERY

## 2022-06-28 PROCEDURE — 3080F DIAST BP >= 90 MM HG: CPT | Performed by: SURGERY

## 2022-06-28 PROCEDURE — 1036F TOBACCO NON-USER: CPT | Performed by: SURGERY

## 2022-06-28 PROCEDURE — 3008F BODY MASS INDEX DOCD: CPT | Performed by: SURGERY

## 2022-06-28 NOTE — PROGRESS NOTES
Surgical Oncology Follow Up       [facility] [place]  [address] [facility] [address]  [facility] [id number] [phone]    Shay Griffith  1950  [id number]  [facility]  [address] [place] [address] [phone]    Diagnoses and all orders for this visit:    Encounter for follow-up examination after completed treatment for malignant neoplasm    History of malignant neuroendocrine tumor  -     CT abdomen pelvis w contrast; Future  -     BUN; Future  -     Creatinine, serum; Future        Chief Complaint   Patient presents with    Follow-up       Return in about 6 months (around 12/28/2022) for Office Visit, Imaging - See orders, Labs - See Treatment Plan, with Sujatha  Oncology History   History of malignant neuroendocrine tumor   3/24/2020 Initial Diagnosis    Primary malignant neuroendocrine tumor of small intestine ([facility] [address] )     4/29/2020 Surgery    Small bowel and mesenteric mass, resection:       - Well-differentiated neuroendocrine tumor, 2 4 cm, G2        - Lymph-vascular and perineural invasion are identified  - 4 of 13 lymph nodes positive for metastatic well-differentiated neuroendocrine tumor (4/13)  - Largest lymph node deposit: 1 5 cm with extra-emigdio extension, (mesenteric mass)  - 3 mitotic figures per 2 mm2, (consistent with grade G2)  - The tumor is extremely close to the serosal surface (approximately   0003 cm from serosal surface)  Radiation    The patient saw @"[employer]@ for radiation treatment  This is the current list of radiation treatment:  Radiation Treatments     No radiation treatments to show   (Treatments may have been administered in another system )            Chemotherapy    [No treatment plan]         Staging: [id number] well-differentiated neuroendocrine tumor of the ileum, April 2020  Treatment history:  Small-bowel resection with mesenteric nodes, April 2020  Current treatment:  Observation  Disease status: KAREN    Stable adrenal nodule      History of Present Illness:  Patient returns in follow-up of her neuroendocrine tumor  She is doing well at this time with no complaints  She denies any abdominal pain, nausea or vomiting  No change in appetite or unintentional weight loss  No flushing, diarrhea, palpitations, headaches, or sweats  CT from June 17, 2022 reveals a stable 1 3 cm right adrenal nodule  There is no evidence metastatic disease  There is also a stable 1 2 cm fat density nodule posterior to the uterus and lateral to the rectum  I personally reviewed her films  Her chromogranin level continues to be normal     Review of Systems  Complete ROS Surg Onc:   Complete ROS Surg Onc:   Constitutional: The patient denies new or recent history of general fatigue, no recent weight loss, no change in appetite  Eyes: No complaints of visual problems, no scleral icterus  ENT: no complaints of ear pain, no hoarseness, no difficulty swallowing,  no tinnitus and no new masses in head, oral cavity, or neck  Cardiovascular: No complaints of chest pain, no palpitations, no ankle edema  Respiratory: No complaints of shortness of breath, no cough  Gastrointestinal: No complaints of jaundice, no bloody stools, no pale stools  Genitourinary: No complaints of dysuria, no hematuria, no nocturia, no frequent urination, no urethral discharge  Musculoskeletal: No complaints of weakness, paralysis, joint stiffness or arthralgias  Integumentary: No complaints of rash, no new lesions  Neurological: No complaints of convulsions, no seizures, no dizziness  Hematologic/Lymphatic: No complaints of easy bruising  Endocrine:  No hot or cold intolerance  No polydipsia, polyphagia, or polyuria  Allergy/immunology:  No environmental allergies  No food allergies  Not immunocompromised    Skin:  No pallor or rash  No wound          Patient Active Problem List   Diagnosis    Rectal cancer ([facility] )    Colitis    Pure hypercholesterolemia    Encounter for administration of vaccine    Essential hypertension    Stage 3 chronic kidney disease ([facility] )    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 ([facility] )    History of malignant neuroendocrine tumor    Adrenal nodule ([facility] )    Encounter for follow-up examination after completed treatment for malignant neoplasm    Thickened endometrium    Medicare welcome visit    Anxiety about health    Vitamin D deficiency    Class 1 obesity due to excess calories without serious comorbidity with body mass index (BMI) of 32 0 to 32 9 in adult    Personal history of COVID-19    Follow-up exam, 3-6 months since previous exam    Chronic hip pain, right    Chronic pain of right knee     Past Medical History:   Diagnosis Date    Colon polyps     Essential hypertension     Hematuria syndrome     Osteoarthritis     Proteinuria     Vitamin D deficiency      Past Surgical History:   Procedure Laterality Date    BREAST BIOPSY Right 2017    benign    COLONOSCOPY  12/2018    3 polyps     COLONOSCOPY      several    MAMMO STEREOTACTIC BREAST BIOPSY LEFT (ALL INC) Left 6/18/2020    MAMMO STEREOTACTIC BREAST BIOPSY RIGHT (ALL INC) Right 6/18/2020    NE LAP,DIAGNOSTIC ABDOMEN N/A 3/24/2020    Procedure: 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;  Surgeon: Daisha Nielson MD;  Location: [facility] MAIN OR;  Service: General    RECTAL BIOPSY N/A 2/9/2018    Procedure: TRANSANAL EXCISION RECTAL POLYP;MUCOSAL POLYPECTOMY;  Surgeon: Carlos Nguyen MD;  Location: [facility] MAIN OR;  Service: Colorectal    SMALL INTESTINE SURGERY N/A 4/29/2020    Procedure: RESECTION SMALL BOWEL;  Surgeon: Alicia Yap MD;  Location: [facility] MAIN OR;  Service: Surgical Oncology     Family History   Problem Relation Age of Onset    Hypertension Mother     Heart disease Father     No Known Problems Daughter     No Known Problems Maternal Grandmother     No Known Problems Maternal Grandfather     No Known Problems Paternal Grandmother     No Known Problems Paternal Grandfather     Pancreatic cancer Brother     No Known Problems Maternal Aunt     No Known Problems Paternal Aunt     No Known Problems Paternal Aunt      Social History     Socioeconomic History    Marital status: /Civil Union     Spouse name: Not on file    Number of children: Not on file    Years of education: Not on file    Highest education level: Not on file   Occupational History    Not on file   Tobacco Use    Smoking status: Former Smoker     Quit date:      Years since quittin 5    Smokeless tobacco: Never Used    Tobacco comment: 27  YEARS AGO    Vaping Use    Vaping Use: Never used   Substance and Sexual Activity    Alcohol use: Not Currently     Comment: occasional    Drug use: Never    Sexual activity: Not on file   Other Topics Concern    Not on file   Social History Narrative    Not on file     Social Determinants of Health     Financial Resource Strain: Not on file   Food Insecurity: Not on file   Transportation Needs: Not on file   Physical Activity: Not on file   Stress: Not on file   Social Connections: Not on file   Intimate Partner Violence: Not on file   Housing Stability: Not on file       Current Outpatient Medications:     Cholecalciferol (Vitamin D3) 50 MCG (2000 UT) capsule, Take 1 capsule (2,000 Units total) by mouth every morning, Disp: , Rfl:     co-enzyme Q-10 50 MG capsule, Take 1 capsule (50 mg total) by mouth every other day, Disp: , Rfl:     fluticasone (FLONASE) 50 mcg/act nasal spray, 1 spray into each nostril daily, Disp: , Rfl:     hydrALAZINE (APRESOLINE) 50 mg tablet, Take 1 tablet (50 mg total) by mouth 3 (three) times a day, Disp: 270 tablet, Rfl: 3    lisinopril (ZESTRIL) 20 mg tablet, Take 1 tablet (20 mg total) by mouth 2 (two) times a day, Disp: 180 tablet, Rfl: 3    metoprolol tartrate (LOPRESSOR) 100 mg tablet, Take 1 tablet by mouth twice daily, Disp: 180 tablet, Rfl: 3    rosuvastatin (CRESTOR) 20 MG tablet, Take 1 tablet (20 mg total) by mouth every other day, Disp: 90 tablet, Rfl: 3    ALPRAZolam (XANAX) 0 25 mg tablet, Take 1 tablet (0 25 mg total) by mouth daily at bedtime as needed for anxiety (Patient not taking: No sig reported), Disp: 60 tablet, Rfl: 0    Current Facility-Administered Medications:     cephalexin (KEFLEX) capsule 500 mg, 500 mg, Oral, Q6H [facility], Mukund Mcdonald MD  No Known Allergies  Vitals:    06/28/22 0839   BP: 142/98   Pulse: 63   Resp: 18   Temp: (!) 96 8 °F (36 °C)   SpO2: 97%       Physical Exam  Constitutional: General appearance: The Patient is well-developed and well-nourished who appears the stated age in no acute distress  Patient is pleasant and talkative  HEENT:  Normocephalic  Sclerae are anicteric  Mucous membranes are moist  Neck is supple without adenopathy  No JVD  Chest: The lungs are clear to auscultation  Cardiac: Heart is regular rate  Abdomen: Abdomen is soft, non-tender, non-distended and without masses  Extremities: There is no clubbing or cyanosis  There is no edema  Symmetric  Neuro: Grossly nonfocal  Gait is normal      Lymphatic: No evidence of cervical adenopathy bilaterally  No evidence of axillary adenopathy bilaterally  No evidence of inguinal adenopathy bilaterally  Skin: Warm, anicteric  Psych:  Patient is pleasant and talkative    Breasts:        Pathology:  [unfilled]    Labs:     Component Ref Range & Units 6/9/22  8:02 AM 12/7/21 10:21 AM 6/17/21  7:10 AM 9/17/20  7:18 AM 4/6/20  9:04 AM   Chromogranin A 0 0 - 101 8 ng/mL 70 8  63 9 CM  69 7 CM  65 5 CM  72 4 CM          Imaging  CT abdomen pelvis w contrast    Result Date: 6/17/2022  Narrative: CT ABDOMEN AND PELVIS WITH IV CONTRAST INDICATION:   Z85 9: Personal history of malignant neoplasm, unspecified  COMPARISON:  CT abdomen and pelvis 12/9/2021 and 11/8/2017 TECHNIQUE:  CT examination of the abdomen and pelvis was performed  Axial, sagittal, and coronal 2D reformatted images were created from the source data and submitted for interpretation  Radiation dose length product (DLP) for this visit:  825 57 mGy-cm   This examination, like all CT scans performed in the [facility], was performed utilizing techniques to minimize radiation dose exposure, including the use of iterative  reconstruction and automated exposure control  IV Contrast:  65 mL of iohexol (OMNIPAQUE)  350 Enteric Contrast:  Enteric contrast was not administered  FINDINGS: ABDOMEN LOWER CHEST:  No clinically significant abnormality identified in the visualized lower chest  LIVER/BILIARY TREE:  Stable 3 mm right anterior hepatic subcentimeter hypodensity present as far back as November 2017 attributed to a cyst  Liver otherwise unremarkable  No duct dilation  GALLBLADDER:  No calcified gallstones  No pericholecystic inflammatory change  SPLEEN:  Stable 2 cm splenic hypodense lesion present as far back as November 2017 compatible with a benign finding  Spleen otherwise unremarkable  PANCREAS:  Unremarkable  ADRENAL GLANDS:  Stable 1 3 cm right adrenal nodule  Stable minimal thickening of the left adrenal gland with punctate calcifications  KIDNEYS/URETERS:  Unremarkable  No hydronephrosis  STOMACH AND BOWEL:  Post partial small bowel resection with unremarkable enteroenteric anastomosis in the right mid abdomen  No bowel obstruction  Single mid sigmoid diverticulum without diverticulitis  APPENDIX:  A normal appendix was visualized  ABDOMINOPELVIC CAVITY:  No ascites  No pneumoperitoneum  No lymphadenopathy  Stable 1 2 cm fat density nodule with soft tissue rim posterior to the lower uterine segment and left lateral to the rectum  VESSELS:  Aortoiliac calcification  No aneurysm   PELVIS REPRODUCTIVE ORGANS:  Unremarkable for patient's age  URINARY BLADDER:  Unremarkable  ABDOMINAL WALL/INGUINAL REGIONS:  Minimal ventral midline supraumbilical postsurgical scar  Chronic right iliopsoas bursitis  OSSEOUS STRUCTURES:  No acute fracture or osseous destructive lesion identified  Degenerative changes of the spine, pubic symphysis, and multiple joints  Stable minimal grade 1 degenerative retrolisthesis of L5 on S1  Impression: No evidence of recurrent or abdominopelvic metastatic disease  Workstation performed: [id number]     I reviewed the above laboratory and imaging data  Discussion/Summary:  66 year-old female status post small bowel resection and resection of a mesenteric mass for a [id number] 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   She is doing very well  She is clinically NAD at 2 years  Sly Divers was asymmetric uptake on her right adrenal gland   Her pheochromocytoma workup was negative   I suspect this is not related to her neuroendocrine tumor  This area is stable on her CT   I do not think she requires any further treatment at this point rather than observation  There is no evidence of recurrence by physical exam, imaging, symptomatology, or laboratory evaluation   I will plan on seeing her again in 6 months with a repeat CT and chromogranin level   She is agreeable to this plan   All her questions were answered

## 2022-06-28 NOTE — LETTER
June 28, 2022     Rylee Zelaya, [address]    Patient: Chago Cevallos   YOB: 1950   Date of Visit: 6/28/2022       Dear Dr Amanda Stein: Thank you for referring [place] to me for evaluation  Below are my notes for this consultation  If you have questions, please do not hesitate to call me  I look forward to following your patient along with you  Sincerely,        Samanta Stone MD        CC: No Recipients  Samanta Stone MD  6/28/2022  8:51 AM  Incomplete               Surgical Oncology Follow Up       [facility] [address] [id number]  [phone]    Chago Cevallos  1950  [id number]  [address] [facility] [address] [id number] [phone]    Diagnoses and all orders for this visit:    Encounter for follow-up examination after completed treatment for malignant neoplasm    History of malignant neuroendocrine tumor  -     CT abdomen pelvis w contrast; Future  -     BUN; Future  -     Creatinine, serum; Future        Chief Complaint   Patient presents with    Follow-up       Return in about 6 months (around 12/28/2022) for Office Visit, Imaging - See orders, Labs - See Treatment Plan, with Sujatha  Oncology History   History of malignant neuroendocrine tumor   3/24/2020 Initial Diagnosis    Primary malignant neuroendocrine tumor of small intestine ([address] )     4/29/2020 Surgery    Small bowel and mesenteric mass, resection:       - Well-differentiated neuroendocrine tumor, 2 4 cm, G2        - Lymph-vascular and perineural invasion are identified  - 4 of 13 lymph nodes positive for metastatic well-differentiated neuroendocrine tumor (4/13)  - Largest lymph node deposit: 1 5 cm with extra-emigdio extension, (mesenteric mass)         - 3 mitotic figures per 2 mm2, (consistent with grade G2)  - The tumor is extremely close to the serosal surface (approximately   0003 cm from serosal surface)  Radiation    The patient saw @[facility]@ for radiation treatment  This is the current list of radiation treatment:  Radiation Treatments     No radiation treatments to show  (Treatments may have been administered in another system )            Chemotherapy    [No treatment plan]         Staging: [id number] well-differentiated neuroendocrine tumor of the ileum, April 2020  Treatment history:  Small-bowel resection with mesenteric nodes, April 2020  Current treatment:  Observation  Disease status: KAREN    Stable adrenal nodule      History of Present Illness:  Patient returns in follow-up of her neuroendocrine tumor  She is doing well at this time with no complaints  She denies any abdominal pain, nausea or vomiting  No change in appetite or unintentional weight loss  No flushing, diarrhea, palpitations, headaches, or sweats  CT from June 17, 2022 reveals a stable 1 3 cm right adrenal nodule  There is no evidence metastatic disease  There is also a stable 1 2 cm fat density nodule posterior to the uterus and lateral to the rectum  I personally reviewed her films  Her chromogranin level continues to be normal     Review of Systems  Complete ROS Surg Onc:   Complete ROS Surg Onc:   Constitutional: The patient denies new or recent history of general fatigue, no recent weight loss, no change in appetite  Eyes: No complaints of visual problems, no scleral icterus  ENT: no complaints of ear pain, no hoarseness, no difficulty swallowing,  no tinnitus and no new masses in head, oral cavity, or neck  Cardiovascular: No complaints of chest pain, no palpitations, no ankle edema  Respiratory: No complaints of shortness of breath, no cough  Gastrointestinal: No complaints of jaundice, no bloody stools, no pale stools     Genitourinary: No complaints of dysuria, no hematuria, no nocturia, no frequent urination, no urethral discharge  Musculoskeletal: No complaints of weakness, paralysis, joint stiffness or arthralgias  Integumentary: No complaints of rash, no new lesions  Neurological: No complaints of convulsions, no seizures, no dizziness  Hematologic/Lymphatic: No complaints of easy bruising  Endocrine:  No hot or cold intolerance  No polydipsia, polyphagia, or polyuria  Allergy/immunology:  No environmental allergies  No food allergies  Not immunocompromised  Skin:  No pallor or rash  No wound          Patient Active Problem List   Diagnosis    Rectal cancer ([place] [address] )    Colitis    Pure hypercholesterolemia    Encounter for administration of vaccine    Essential hypertension    Stage 3 chronic kidney disease ([place] [address] )    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 ([place] [address] )    History of malignant neuroendocrine tumor    Adrenal nodule ([place] [address] )    Encounter for follow-up examination after completed treatment for malignant neoplasm    Thickened endometrium    Medicare welcome visit    Anxiety about health    Vitamin D deficiency    Class 1 obesity due to excess calories without serious comorbidity with body mass index (BMI) of 32 0 to 32 9 in adult    Personal history of COVID-19    Follow-up exam, 3-6 months since previous exam    Chronic hip pain, right    Chronic pain of right knee     Past Medical History:   Diagnosis Date    Colon polyps     Essential hypertension     Hematuria syndrome     Osteoarthritis     Proteinuria     Vitamin D deficiency      Past Surgical History:   Procedure Laterality Date    BREAST BIOPSY Right 2017    benign    COLONOSCOPY  12/2018    3 polyps     COLONOSCOPY      several    MAMMO STEREOTACTIC BREAST BIOPSY LEFT (ALL INC) Left 6/18/2020    MAMMO STEREOTACTIC BREAST BIOPSY RIGHT (ALL INC) Right 6/18/2020    MD LAP,DIAGNOSTIC ABDOMEN N/A 3/24/2020    Procedure: 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;  Surgeon: Lyndsey Torres MD;  Location: [address] [place] [address] MAIN OR;  Service: General    RECTAL BIOPSY N/A 2018    Procedure: TRANSANAL EXCISION RECTAL POLYP;MUCOSAL POLYPECTOMY;  Surgeon: Benjamin Lynn MD;  Location:  MAIN OR;  Service: Colorectal    SMALL INTESTINE SURGERY N/A 2020    Procedure: RESECTION SMALL BOWEL;  Surgeon: Denver Romance, MD;  Location: [facility] MAIN [facility];  Service: Surgical Oncology     Family History   Problem Relation Age of Onset    Hypertension Mother     Heart disease Father     No Known Problems Daughter     No Known Problems Maternal Grandmother     No Known Problems Maternal Grandfather     No Known Problems Paternal Grandmother     No Known Problems Paternal Grandfather     Pancreatic cancer Brother     No Known Problems Maternal Aunt     No Known Problems Paternal Aunt     No Known Problems Paternal Aunt      Social History     Socioeconomic History    Marital status: /Civil Union     Spouse name: Not on file    Number of children: Not on file    Years of education: Not on file    Highest education level: Not on file   Occupational History    Not on file   Tobacco Use    Smoking status: Former Smoker     Quit date:      Years since quittin 5    Smokeless tobacco: Never Used    Tobacco comment: 27  YEARS AGO    Vaping Use    Vaping Use: Never used   Substance and Sexual Activity    Alcohol use: Not Currently     Comment: occasional    Drug use: Never    Sexual activity: Not on file   Other Topics Concern    Not on file   Social History Narrative    Not on file     Social Determinants of Health     Financial Resource Strain: Not on file   Food Insecurity: Not on file   Transportation Needs: Not on file   Physical Activity: Not on file   Stress: Not on file   Social Connections: Not on file   Intimate Partner Violence: Not on file   Housing Stability: Not on file       Current Outpatient Medications:     Cholecalciferol (Vitamin D3) 50 MCG (2000 UT) capsule, Take 1 capsule (2,000 Units total) by mouth every morning, Disp: , Rfl:     co-enzyme Q-10 50 MG capsule, Take 1 capsule (50 mg total) by mouth every other day, Disp: , Rfl:     fluticasone (FLONASE) 50 mcg/act nasal spray, 1 spray into each nostril daily, Disp: , Rfl:     hydrALAZINE (APRESOLINE) 50 mg tablet, Take 1 tablet (50 mg total) by mouth 3 (three) times a day, Disp: 270 tablet, Rfl: 3    lisinopril (ZESTRIL) 20 mg tablet, Take 1 tablet (20 mg total) by mouth 2 (two) times a day, Disp: 180 tablet, Rfl: 3    metoprolol tartrate (LOPRESSOR) 100 mg tablet, Take 1 tablet by mouth twice daily, Disp: 180 tablet, Rfl: 3    rosuvastatin (CRESTOR) 20 MG tablet, Take 1 tablet (20 mg total) by mouth every other day, Disp: 90 tablet, Rfl: 3    ALPRAZolam (XANAX) 0 25 mg tablet, Take 1 tablet (0 25 mg total) by mouth daily at bedtime as needed for anxiety (Patient not taking: No sig reported), Disp: 60 tablet, Rfl: 0    Current Facility-Administered Medications:     cephalexin (KEFLEX) capsule 500 mg, 500 mg, Oral, Q6H [facility], Bertha Carrion MD  No Known Allergies  Vitals:    06/28/22 0839   BP: 142/98   Pulse: 63   Resp: 18   Temp: (!) 96 8 °F (36 °C)   SpO2: 97%       Physical Exam  Constitutional: General appearance: The Patient is well-developed and well-nourished who appears the stated age in no acute distress  Patient is pleasant and talkative  HEENT:  Normocephalic  Sclerae are anicteric  Mucous membranes are moist  Neck is supple without adenopathy  No JVD  Chest: The lungs are clear to auscultation  Cardiac: Heart is regular rate  Abdomen: Abdomen is soft, non-tender, non-distended and without masses  Extremities: There is no clubbing or cyanosis  There is no edema  Symmetric  Neuro: Grossly nonfocal  Gait is normal      Lymphatic: No evidence of cervical adenopathy bilaterally  No evidence of axillary adenopathy bilaterally   No evidence of inguinal adenopathy bilaterally  Skin: Warm, anicteric  Psych:  Patient is pleasant and talkative  Breasts:        Pathology:  [unfilled]    Labs:     Component Ref Range & Units 6/9/22  8:02 AM 12/7/21 10:21 AM 6/17/21  7:10 AM 9/17/20  7:18 AM 4/6/20  9:04 AM   Chromogranin A 0 0 - 101 8 ng/mL 70 8  63 9 CM  69 7 CM  65 5 CM  72 4 CM          Imaging  CT abdomen pelvis w contrast    Result Date: 6/17/2022  Narrative: CT ABDOMEN AND PELVIS WITH IV CONTRAST INDICATION:   Z85 9: Personal history of malignant neoplasm, unspecified  COMPARISON:  CT abdomen and pelvis 12/9/2021 and 11/8/2017 TECHNIQUE:  CT examination of the abdomen and pelvis was performed  Axial, sagittal, and coronal 2D reformatted images were created from the source data and submitted for interpretation  Radiation dose length product (DLP) for this visit:  825 57 mGy-cm   This examination, like all CT scans performed in the [facility], was performed utilizing techniques to minimize radiation dose exposure, including the use of iterative  reconstruction and automated exposure control  IV Contrast:  65 mL of iohexol (OMNIPAQUE)  350 Enteric Contrast:  Enteric contrast was not administered  FINDINGS: ABDOMEN LOWER CHEST:  No clinically significant abnormality identified in the visualized lower chest  LIVER/BILIARY TREE:  Stable 3 mm right anterior hepatic subcentimeter hypodensity present as far back as November 2017 attributed to a cyst  Liver otherwise unremarkable  No duct dilation  GALLBLADDER:  No calcified gallstones  No pericholecystic inflammatory change  SPLEEN:  Stable 2 cm splenic hypodense lesion present as far back as November 2017 compatible with a benign finding  Spleen otherwise unremarkable  PANCREAS:  Unremarkable  ADRENAL GLANDS:  Stable 1 3 cm right adrenal nodule  Stable minimal thickening of the left adrenal gland with punctate calcifications  KIDNEYS/URETERS:  Unremarkable  No hydronephrosis   STOMACH AND BOWEL:  Post partial small bowel resection with unremarkable enteroenteric anastomosis in the right mid abdomen  No bowel obstruction  Single mid sigmoid diverticulum without diverticulitis  APPENDIX:  A normal appendix was visualized  ABDOMINOPELVIC CAVITY:  No ascites  No pneumoperitoneum  No lymphadenopathy  Stable 1 2 cm fat density nodule with soft tissue rim posterior to the lower uterine segment and left lateral to the rectum  VESSELS:  Aortoiliac calcification  No aneurysm  PELVIS REPRODUCTIVE ORGANS:  Unremarkable for patient's age  URINARY BLADDER:  Unremarkable  ABDOMINAL WALL/INGUINAL REGIONS:  Minimal ventral midline supraumbilical postsurgical scar  Chronic right iliopsoas bursitis  OSSEOUS STRUCTURES:  No acute fracture or osseous destructive lesion identified  Degenerative changes of the spine, pubic symphysis, and multiple joints  Stable minimal grade 1 degenerative retrolisthesis of L5 on S1  Impression: No evidence of recurrent or abdominopelvic metastatic disease  Workstation performed: [id number]     I reviewed the above laboratory and imaging data  Discussion/Summary:  66 year-old female status post small bowel resection and resection of a mesenteric mass for a [id number] 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   She is doing very well  She is clinically NAD at 2 years  Beverly Rich was asymmetric uptake on her right adrenal gland   Her pheochromocytoma workup was negative   I suspect this is not related to her neuroendocrine tumor  This area is stable on her CT   I do not think she requires any further treatment at this point rather than observation  There is no evidence of recurrence by physical exam, imaging, symptomatology, or laboratory evaluation   I will plan on seeing her again in 6 months with a repeat CT and chromogranin level   She is agreeable to this plan   All her questions were answered

## 2022-07-29 ENCOUNTER — RA CDI HCC (OUTPATIENT)
Dept: OTHER | Facility: HOSPITAL | Age: 72
End: 2022-07-29

## 2022-07-29 NOTE — PROGRESS NOTES
Chere[facility]  coding opportunities       Chart reviewed, no opportunity found:   [address]        Patients Insurance     Medicare Insurance: [employer]

## 2022-08-08 ENCOUNTER — OFFICE VISIT (OUTPATIENT)
Dept: INTERNAL MEDICINE CLINIC | Facility: CLINIC | Age: 72
End: 2022-08-08
Payer: COMMERCIAL

## 2022-08-08 VITALS
OXYGEN SATURATION: 96 % | DIASTOLIC BLOOD PRESSURE: 80 MMHG | HEART RATE: 68 BPM | WEIGHT: 213.6 LBS | BODY MASS INDEX: 32.37 KG/M2 | SYSTOLIC BLOOD PRESSURE: 133 MMHG | HEIGHT: 68 IN | TEMPERATURE: 97.9 F

## 2022-08-08 DIAGNOSIS — I10 ESSENTIAL HYPERTENSION: ICD-10-CM

## 2022-08-08 DIAGNOSIS — Z09 FOLLOW-UP EXAM, 3-6 MONTHS SINCE PREVIOUS EXAM: Primary | ICD-10-CM

## 2022-08-08 DIAGNOSIS — K57.31 DIVERTICULOSIS OF COLON WITH HEMORRHAGE: ICD-10-CM

## 2022-08-08 DIAGNOSIS — K52.9 COLITIS: ICD-10-CM

## 2022-08-08 PROCEDURE — 99214 OFFICE O/P EST MOD 30 MIN: CPT | Performed by: NURSE PRACTITIONER

## 2022-08-08 PROCEDURE — 3075F SYST BP GE 130 - 139MM HG: CPT | Performed by: NURSE PRACTITIONER

## 2022-08-08 PROCEDURE — 1160F RVW MEDS BY RX/DR IN RCRD: CPT | Performed by: NURSE PRACTITIONER

## 2022-08-08 PROCEDURE — 3079F DIAST BP 80-89 MM HG: CPT | Performed by: NURSE PRACTITIONER

## 2022-08-08 RX ORDER — METRONIDAZOLE 500 MG/1
500 TABLET ORAL EVERY 8 HOURS SCHEDULED
Qty: 30 TABLET | Refills: 0 | Status: SHIPPED | OUTPATIENT
Start: 2022-08-08 | End: 2022-08-18

## 2022-08-08 RX ORDER — CIPROFLOXACIN 500 MG/1
500 TABLET, FILM COATED ORAL EVERY 12 HOURS SCHEDULED
Qty: 20 TABLET | Refills: 0 | Status: SHIPPED | OUTPATIENT
Start: 2022-08-08 | End: 2022-08-12 | Stop reason: SDUPTHER

## 2022-08-08 NOTE — PATIENT INSTRUCTIONS
Diverticulitis   AMBULATORY CARE:   Diverticulitis  is a condition that causes small pockets along your intestine called diverticula to become inflamed or infected  This is caused by hard bowel movement, food, or bacteria that get stuck in the pockets  Signs and symptoms include the following:   Pain in the lower left side of your abdomen    Fever and chills    Nausea or vomiting    Constipation or diarrhea    An urge to urinate or have a bowel movement more often than usual    Bloody bowel movements    Bloating and gas    Seek care immediately if:   You have bowel movement or foul-smelling discharge leaking from your vagina or in your urine  You have severe diarrhea  You urinate less than usual or not at all  You are not able to have a bowel movement  You cannot stop vomiting  You have cramps or severe abdominal pain and a fever  You have new or increased blood in your bowel movements  Call your doctor if:   You have pain when you urinate  Your symptoms get worse or do not go away  You have questions or concerns about your condition or care  Treatment:  Mild diverticulitis can be treated at home  You may need to rest and follow a clear liquid diet until your diverticulitis gets better  You will be admitted to the hospital if you have severe diverticulitis  You may need any of the following:  Antibiotics  help treat or prevent a bacterial infection  Prescription pain medicine  may be given  Ask your healthcare provider how to take this medicine safely  Some prescription pain medicines contain acetaminophen  Do not take other medicines that contain acetaminophen without talking to your healthcare provider  Too much acetaminophen may cause liver damage  Prescription pain medicine may cause constipation  Ask your healthcare provider how to prevent or treat constipation  An IV  may be used to give you liquids and nutrition   You may not be able to eat or drink anything until your healthcare provider says it is okay  Drainage  may be done to reduce inflammation or treat infection  Your healthcare provider may insert a small tube through an incision in your abdomen to drain pus from infected diverticula  Surgery  may be needed if there is a hole in your bowel or a large amount of swelling  A healthcare provider will remove the infected or inflamed areas of your colon  Clear liquid diet:  A clear liquid diet includes any liquids that you can see through  Examples include water, ginger-ju, cranberry or apple juice, frozen fruit ice, or broth  Stay on a clear liquid diet until your symptoms are gone, or as directed  Follow up with your doctor as directed: You may need to return for a colonoscopy  When your symptoms are gone, you may need a low-fat, high-fiber diet to prevent diverticulitis from developing again  Your healthcare provider or dietitian can help you create meal plans  Write down your questions so you remember to ask them during your visits  © Copyright [employer] 2022 Information is for End User's use only and may not be sold, redistributed or otherwise used for commercial purposes  All illustrations and images included in CareNotes® are the copyrighted property of A D A M , Inc  or [address] [place] [address]  The above information is an  only  It is not intended as medical advice for individual conditions or treatments  Talk to your doctor, nurse or pharmacist before following any medical regimen to see if it is safe and effective for you       _____________________________________________________________________________    Diverticulosis Diet   AMBULATORY CARE:   A diverticulosis diet  includes high-fiber foods  High-fiber foods help you have regular bowel movements  Extra fiber may decrease your risk of forming new diverticula (small pockets) in your intestine  A high-fiber diet may also help prevent diverticulitis   Diverticulitis is a painful condition that occurs when diverticula become inflamed or infected  You do not need to avoid nuts, seeds, corn, or popcorn while you are on a diverticulosis diet  Call your doctor or dietitian if:   You have questions about a high-fiber diet  You have a change in your bowel movements  You have an upset stomach  You have a fever  You have pain in your lower abdomen on the left side  You have questions about your condition or care  Amount of fiber you need: You may need 25 to 35 grams of fiber each day  Ask your dietitian or healthcare provider how much fiber you should have  Increase your intake of fiber slowly  When you eat more fiber, you may have gas and feel bloated  You may need to take a fiber supplement if you do not get enough fiber from food  Drink plenty of liquids as you increase the fiber in your diet  Your dietitian or healthcare provider may recommend 8 eight-ounce cups or more each day  Ask which liquids are best for you  Foods that are high in fiber:       Foods with at least 4 grams of fiber per serving:      ? to ½ cup of high-fiber cereal (check the nutrition label on the box)    ½ cup of blackberries or raspberries    4 dried prunes    1 cooked artichoke    ½ cup of cooked legumes, such as lentils, or red, kidney, and whitfield beans    Foods with 1 to 3 grams of fiber per servin slice of whole-wheat, pumpernickel, or rye bread    4 whole-wheat crackers    ½ cup of cereal with 1 to 3 grams of fiber per serving (check the nutrition label on the box)    1 piece of fruit, such as an apple, banana, pear, kiwi, or orange    3 dates    ½ cup of canned apricots, fruit cocktail, peaches, or pears    ½ cup of raw or cooked vegetables, such as carrots, cauliflower, cabbage, spinach, squash, or corn    © Copyright [employer]  Information is for End User's use only and may not be sold, redistributed or otherwise used for commercial purposes   All illustrations and images included in CareNotes® are the copyrighted property of A D A M , Inc  or Yolanda Elmore   The above information is an  only  It is not intended as medical advice for individual conditions or treatments  Talk to your doctor, nurse or pharmacist before following any medical regimen to see if it is safe and effective for you     __________________________________________________________________    Diverticulitis Diet   WHAT YOU NEED TO KNOW:   A diverticulitis diet includes foods that allow your intestines to rest while you have diverticulitis  Diverticulitis is a condition that causes small pockets along your intestine called diverticula to become inflamed or infected  This is caused by hard bowel movement, food, or bacteria that get stuck in the pockets  DISCHARGE INSTRUCTIONS:   Foods that may be recommended while you have diverticulitis:   A clear liquid diet may be recommended for 2 to 3 days  A clear liquid diet includes clear liquids, and foods that are liquid at room temperature  Examples include the following:     Water and clear juices (such as apple, cranberry, or grape), strained citrus juices or fruit punch    Coffee or tea (without cream or milk)    Clear sports drinks or soft drinks, such as ginger ale, lemon-lime soda, or club soda (no cola or root beer)    Clear broth, bouillon, or consommé    Plain popsicles (no popsicles with pureed fruit or fiber)    Flavored gelatin without fruit    Low-fiber foods may be recommended until your symptoms improve    Examples include the following:     Cream of wheat and finely ground grits    White bread, white pasta, and white rice    Canned and well-cooked fruit without skins or seeds, and juice without pulp    Canned and well-cooked vegetables without skins or seeds, and vegetable juice    Cow's milk, lactose-free milk, soy milk, and rice milk    Yogurt, cottage cheese, and sherbet    Eggs, poultry (such as chicken and turkey), fish, and tender, ground, well-cooked beef     Tofu and smooth nut butters, such as peanut butter    Broth and strained soups made of low-fiber foods    High-fiber foods  can help prevent diverticulosis and diverticulitis  Your healthcare provider will tell you when you can add high-fiber foods back into your diet  Examples include the following:  Whole grains and breads, and cereals made with whole grains    Dried fruit, fresh fruit with skin, and fruit pulp    Raw vegetables    Cooked greens, such as spinach    Tough meat and meat with gristle    Legumes, such as whitfield beans and lentils       Call your doctor or dietitian if:   Your symptoms do not get better, or they get worse  You have questions about the foods you should eat  You have questions or concerns about your condition or care  © Copyright [employer] 2022 Information is for End User's use only and may not be sold, redistributed or otherwise used for commercial purposes  All illustrations and images included in CareNotes® are the copyrighted property of A D A M , Inc  or [facility] Lakisha Elmore   The above information is an  only  It is not intended as medical advice for individual conditions or treatments   Talk to your doctor, nurse or pharmacist before following any medical regimen to see if it is safe and effective for you     _____________________________________________________________________________    Problem List Items Addressed This Visit          Digestive    Colitis       Cardiovascular and Mediastinum    Essential hypertension       Other    Follow-up exam, 3-6 months since previous exam - Primary

## 2022-08-08 NOTE — PROGRESS NOTES
Assessment/Plan:     Problem List Items Addressed This Visit        Digestive    Colitis    Relevant Medications    ciprofloxacin (CIPRO) 500 mg tablet    metroNIDAZOLE (FLAGYL) 500 mg tablet    Diverticulosis of colon with hemorrhage     With increased bloating  Will order cipro and flagyl         Relevant Medications    ciprofloxacin (CIPRO) 500 mg tablet    metroNIDAZOLE (FLAGYL) 500 mg tablet       Cardiovascular and Mediastinum    Essential hypertension     Positive White Coat Syndrome  8/2/2022 - 148/80  8/3/2022 - 136/80  8/4/2022 - 147/78; 141/80  8/5/2022 - 145/76  8/6/2022 - 132/76  8/7/2022 - 133/76; 130/72  8/8/2022- 133/80  Continue lisinopril, hydralazine, metoprolol            Other    Follow-up exam, 3-6 months since previous exam - Primary          Subjective:      Patient ID: Ary Grewal is a 71 y o  female  The patient is here to discuss her bowel issues  The following portions of the patient's history were reviewed and updated as appropriate:     Past Medical History:  She has a past medical history of Colon polyps, Essential hypertension, Hematuria syndrome, Osteoarthritis, Proteinuria, and Vitamin D deficiency  ,  _______________________________________________________________________  Medical Problems:  does not have any pertinent problems on file ,  _______________________________________________________________________  Past Surgical History:   has a past surgical history that includes Rectal biopsy (N/A, 2/9/2018); Colonoscopy (12/2018); Colonoscopy; Breast biopsy (Right, 2017); pr lap,diagnostic abdomen (N/A, 3/24/2020); Small intestine surgery (N/A, 4/29/2020); Mammo stereotactic breast biopsy right (all inc) (Right, 6/18/2020); and Mammo stereotactic breast biopsy left (all inc) (Left, 6/18/2020)  ,  _______________________________________________________________________  Family History:  family history includes Heart disease in her father; Hypertension in her mother; No Known Problems in her daughter, maternal aunt, maternal grandfather, maternal grandmother, paternal aunt, paternal aunt, paternal grandfather, and paternal grandmother; Pancreatic cancer in her brother ,  _______________________________________________________________________  Social History:   reports that she quit smoking about 37 years ago  She has never used smokeless tobacco  She reports previous alcohol use  She reports that she does not use drugs  ,  _______________________________________________________________________  Allergies:  has No Known Allergies     _______________________________________________________________________  Current Outpatient Medications   Medication Sig Dispense Refill    Cholecalciferol (Vitamin D3) 50 MCG (2000 UT) capsule Take 1 capsule (2,000 Units total) by mouth every morning      ciprofloxacin (CIPRO) 500 mg tablet Take 1 tablet (500 mg total) by mouth every 12 (twelve) hours for 10 days 20 tablet 0    co-enzyme Q-10 50 MG capsule Take 1 capsule (50 mg total) by mouth every other day      fluticasone (FLONASE) 50 mcg/act nasal spray 1 spray into each nostril daily      hydrALAZINE (APRESOLINE) 50 mg tablet Take 1 tablet (50 mg total) by mouth 3 (three) times a day 270 tablet 3    lisinopril (ZESTRIL) 20 mg tablet Take 1 tablet (20 mg total) by mouth 2 (two) times a day 180 tablet 3    metoprolol tartrate (LOPRESSOR) 100 mg tablet Take 1 tablet by mouth twice daily 180 tablet 3    metroNIDAZOLE (FLAGYL) 500 mg tablet Take 1 tablet (500 mg total) by mouth every 8 (eight) hours for 10 days 30 tablet 0    rosuvastatin (CRESTOR) 20 MG tablet Take 1 tablet (20 mg total) by mouth every other day 90 tablet 3    ALPRAZolam (XANAX) 0 25 mg tablet Take 1 tablet (0 25 mg total) by mouth daily at bedtime as needed for anxiety (Patient not taking: No sig reported) 60 tablet 0     Current Facility-Administered Medications   Medication Dose Route Frequency Provider Last Rate Last Admin    cephalexin (KEFLEX) capsule 500 mg  500 mg Oral Q6H Lauro Albrecht MD         _______________________________________________________________________      Review of Systems   Constitutional: Negative for activity change, chills, fatigue and fever  HENT: Negative for rhinorrhea and sore throat  Eyes: Negative for pain  Respiratory: Negative for cough and shortness of breath  Cardiovascular: Negative for chest pain, palpitations and leg swelling  Gastrointestinal: Positive for abdominal distention, abdominal pain and blood in stool  Negative for constipation, diarrhea, nausea and vomiting  Genitourinary: Negative for difficulty urinating, flank pain, frequency and urgency  Musculoskeletal: Negative for gait problem, joint swelling and myalgias  Skin: Negative for color change  Neurological: Negative for dizziness, weakness, light-headedness and headaches  Psychiatric/Behavioral: Negative for sleep disturbance  The patient is not nervous/anxious  All other systems reviewed and are negative  Objective:  Vitals:    08/08/22 1351 08/08/22 1457   BP: 150/100 133/80   BP Location: Left arm    Patient Position: Sitting    Cuff Size: Standard    Pulse: 68    Temp: 97 9 °F (36 6 °C)    SpO2: 96%    Weight: 96 9 kg (213 lb 9 6 oz)    Height: 5' 8" (1 727 m)      Body mass index is 32 48 kg/m²  Physical Exam  Vitals reviewed  Constitutional:       General: She is awake  Appearance: Normal appearance  She is well-developed and normal weight  HENT:      Head: Normocephalic and atraumatic  Nose: Nose normal       Mouth/Throat:      Mouth: Mucous membranes are moist    Eyes:      Extraocular Movements: Extraocular movements intact  Cardiovascular:      Rate and Rhythm: Normal rate and regular rhythm  Pulses: Normal pulses  Heart sounds: Normal heart sounds  Pulmonary:      Effort: Pulmonary effort is normal       Breath sounds: Normal breath sounds     Abdominal: General: Bowel sounds are normal       Palpations: Abdomen is soft  Comments: Increased bloating and lower abdominal pain    Musculoskeletal:         General: Normal range of motion  Cervical back: Normal range of motion  Right lower leg: No edema  Left lower leg: No edema  Skin:     General: Skin is warm and dry  Neurological:      Mental Status: She is alert and oriented to person, place, and time  Psychiatric:         Attention and Perception: Attention normal          Mood and Affect: Mood normal          Speech: Speech normal          Behavior: Behavior normal  Behavior is cooperative

## 2022-08-08 NOTE — ASSESSMENT & PLAN NOTE
· Positive White Coat Syndrome  · 8/2/2022 - 148/80  · 8/3/2022 - 136/80  · 8/4/2022 - 147/78; 141/80  · 8/5/2022 - 145/76  · 8/6/2022 - 132/76  · 8/7/2022 - 133/76; 130/72  · 8/8/2022- 133/80  · Continue lisinopril, hydralazine, metoprolol

## 2022-08-12 DIAGNOSIS — K52.9 COLITIS: ICD-10-CM

## 2022-08-12 DIAGNOSIS — K57.31 DIVERTICULOSIS OF COLON WITH HEMORRHAGE: ICD-10-CM

## 2022-08-12 RX ORDER — CIPROFLOXACIN 500 MG/1
500 TABLET, FILM COATED ORAL EVERY 12 HOURS SCHEDULED
Qty: 20 TABLET | Refills: 0 | Status: SHIPPED | OUTPATIENT
Start: 2022-08-12 | End: 2022-08-22

## 2022-08-13 DIAGNOSIS — I10 ESSENTIAL HYPERTENSION: ICD-10-CM

## 2022-08-13 RX ORDER — METOPROLOL TARTRATE 100 MG/1
TABLET ORAL
Qty: 180 TABLET | Refills: 3 | Status: SHIPPED | OUTPATIENT
Start: 2022-08-13

## 2022-09-08 DIAGNOSIS — N18.31 STAGE 3A CHRONIC KIDNEY DISEASE (HCC): Primary | ICD-10-CM

## 2022-09-14 ENCOUNTER — TELEPHONE (OUTPATIENT)
Dept: NEPHROLOGY | Facility: CLINIC | Age: 72
End: 2022-09-14

## 2022-09-14 NOTE — TELEPHONE ENCOUNTER
Left message for patient to have fasting lab work done prior to her appointment next week  Told to call us if any questions

## 2022-09-15 ENCOUNTER — APPOINTMENT (OUTPATIENT)
Dept: LAB | Facility: CLINIC | Age: 72
End: 2022-09-15
Payer: COMMERCIAL

## 2022-09-15 DIAGNOSIS — N18.31 STAGE 3A CHRONIC KIDNEY DISEASE (HCC): ICD-10-CM

## 2022-09-15 LAB
ANION GAP SERPL CALCULATED.3IONS-SCNC: 6 MMOL/L (ref 4–13)
BUN SERPL-MCNC: 14 MG/DL (ref 5–25)
CALCIUM SERPL-MCNC: 9.1 MG/DL (ref 8.3–10.1)
CHLORIDE SERPL-SCNC: 106 MMOL/L (ref 96–108)
CO2 SERPL-SCNC: 26 MMOL/L (ref 21–32)
CREAT SERPL-MCNC: 1.14 MG/DL (ref 0.6–1.3)
GFR SERPL CREATININE-BSD FRML MDRD: 48 ML/MIN/1.73SQ M
GLUCOSE P FAST SERPL-MCNC: 94 MG/DL (ref 65–99)
POTASSIUM SERPL-SCNC: 4.1 MMOL/L (ref 3.5–5.3)
SODIUM SERPL-SCNC: 138 MMOL/L (ref 135–147)

## 2022-09-15 PROCEDURE — 36415 COLL VENOUS BLD VENIPUNCTURE: CPT

## 2022-09-15 PROCEDURE — 80048 BASIC METABOLIC PNL TOTAL CA: CPT

## 2022-09-21 ENCOUNTER — TELEPHONE (OUTPATIENT)
Dept: HEMATOLOGY ONCOLOGY | Facility: CLINIC | Age: 72
End: 2022-09-21

## 2022-09-21 NOTE — TELEPHONE ENCOUNTER
Patient confirmed new appt with juli on 1/3/22 at 8:30 am at [facility]

## 2022-09-22 ENCOUNTER — OFFICE VISIT (OUTPATIENT)
Dept: NEPHROLOGY | Facility: CLINIC | Age: 72
End: 2022-09-22
Payer: COMMERCIAL

## 2022-09-22 VITALS
BODY MASS INDEX: 31.64 KG/M2 | SYSTOLIC BLOOD PRESSURE: 150 MMHG | OXYGEN SATURATION: 99 % | HEIGHT: 68 IN | WEIGHT: 208.8 LBS | HEART RATE: 74 BPM | DIASTOLIC BLOOD PRESSURE: 90 MMHG

## 2022-09-22 DIAGNOSIS — N18.31 STAGE 3A CHRONIC KIDNEY DISEASE (HCC): Primary | ICD-10-CM

## 2022-09-22 DIAGNOSIS — I10 ESSENTIAL HYPERTENSION: ICD-10-CM

## 2022-09-22 PROCEDURE — 99212 OFFICE O/P EST SF 10 MIN: CPT | Performed by: NURSE PRACTITIONER

## 2022-09-22 PROCEDURE — 3077F SYST BP >= 140 MM HG: CPT | Performed by: NURSE PRACTITIONER

## 2022-09-22 PROCEDURE — 3080F DIAST BP >= 90 MM HG: CPT | Performed by: NURSE PRACTITIONER

## 2022-09-22 PROCEDURE — 1160F RVW MEDS BY RX/DR IN RCRD: CPT | Performed by: NURSE PRACTITIONER

## 2022-09-22 NOTE — PROGRESS NOTES
Nephrology   Office [address] [facility] 71 y o  female MRN: [id number]    Encounter: [id number]        Javi was seen in the [place] office today  All diagnoses and orders for visit:     1  Stage 3a chronic kidney disease ([facility] [address] )  · Baseline creatinine around 1 0-1 15 mg/dL per  records dating back to 2018  Normal kidney/bladder anatomy, bland urine  Etiology possibly due to hypertensive nephrosclerosis, age-related nephron loss, ? Obesity related issues  · Blood pressure log reviewed, blood pressures appropriate and within goal < 130/80 mmHg  · On lisinopril  · Continue to avoid potential nephrotoxins, NSAIDs  · Advised weight loss  · Return to office in 6 months   -     Comprehensive metabolic panel; Future; Expected date: 03/01/2023   -     CBC and differential; Future; Expected date: 03/01/2023   -     Microalbumin / creatinine urine ratio; Future; Expected date: 03/01/2023   -     Urinalysis with microscopic; Future; Expected date: 03/01/2023   -     Protein / creatinine ratio, urine; Future; Expected date: 03/01/2023  2  Essential hypertension  · Blood pressure goal < 130/80 mmHg  Home log reviewed-all appropriate and within goal  As white coat hypertension  No changes made  · Low sodium diet with increased dietary potassium        HPI: Elizabeth Watkins is a 71 y o  female who follows with Dr Kalyani Mike for CKD 3a  Other history includes malignant neuroendocrine tumor s/p mass resection  Recently had colitis treated with Cipro and Flagyl by PCP  Mistakenly taking CoQ10 300 mg qod instead of 50 mg  Stable from a nephrology perspective  She will reduce CoQ10 to recommended dose 50 mg qod  Labs and return to office in 6 months with Dr Kalyani Mike  ROS:   Review of Systems   Constitutional: Negative for chills and fever  Hot flashes    HENT: Negative for ear pain and sore throat  Eyes: Negative for pain and visual disturbance     Respiratory: Negative for cough and shortness of breath  Cardiovascular: Negative for chest pain and palpitations  Gastrointestinal: Negative for abdominal pain and vomiting  Genitourinary: Negative for dysuria and hematuria  Musculoskeletal: Negative for arthralgias and back pain  Skin: Negative for color change and rash  Neurological: Negative for seizures and syncope  All other systems reviewed and are negative  Allergies: Patient has no known allergies      Medications:   Current Outpatient Medications:     ALPRAZolam (XANAX) 0 25 mg tablet, Take 1 tablet (0 25 mg total) by mouth daily at bedtime as needed for anxiety, Disp: 60 tablet, Rfl: 0    Cholecalciferol (Vitamin D3) 50 MCG (2000 UT) capsule, Take 1 capsule (2,000 Units total) by mouth every morning, Disp: , Rfl:     co-enzyme Q-10 50 MG capsule, Take 1 capsule (50 mg total) by mouth every other day, Disp: , Rfl:     fluticasone (FLONASE) 50 mcg/act nasal spray, 1 spray into each nostril daily, Disp: , Rfl:     hydrALAZINE (APRESOLINE) 50 mg tablet, Take 1 tablet (50 mg total) by mouth 3 (three) times a day, Disp: 270 tablet, Rfl: 3    lisinopril (ZESTRIL) 20 mg tablet, Take 1 tablet (20 mg total) by mouth 2 (two) times a day, Disp: 180 tablet, Rfl: 3    metoprolol tartrate (LOPRESSOR) 100 mg tablet, TAKE ONE TABLET BY MOUTH TWICE A DAY, Disp: 180 tablet, Rfl: 3    rosuvastatin (CRESTOR) 20 MG tablet, Take 1 tablet (20 mg total) by mouth every other day, Disp: 90 tablet, Rfl: 3    Current Facility-Administered Medications:     cephalexin (KEFLEX) capsule 500 mg, 500 mg, Oral, Q6H [address], Shay Perez MD    Past Medical History:   Diagnosis Date    Colon polyps     Essential hypertension     Hematuria syndrome     Osteoarthritis     Proteinuria     Vitamin D deficiency      Past Surgical History:   Procedure Laterality Date    BREAST BIOPSY Right 2017    benign    COLONOSCOPY  12/2018    3 polyps     COLONOSCOPY      several    MAMMO STEREOTACTIC BREAST BIOPSY LEFT (ALL INC) Left 6/18/2020    MAMMO STEREOTACTIC BREAST BIOPSY RIGHT (ALL INC) Right 6/18/2020    MA LAP,DIAGNOSTIC ABDOMEN N/A 3/24/2020    Procedure: 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;  Surgeon: Iesha Curtis MD;  Location: [address] [place], [place] [address]o [place] [facility];  Service: General    RECTAL BIOPSY N/A 2/9/2018    Procedure: TRANSANAL EXCISION RECTAL POLYP;MUCOSAL POLYPECTOMY;  Surgeon: Ernst Anderson MD;  Location: [facility];  Service: Colorectal    SMALL INTESTINE SURGERY N/A 4/29/2020    Procedure: RESECTION SMALL BOWEL;  Surgeon: Randi Dempsey MD;  Location: [facility];  Service: Surgical Oncology     Family History   Problem Relation Age of Onset    Hypertension Mother     Heart disease Father     No Known Problems Daughter     No Known Problems Maternal Grandmother     No Known Problems Maternal Grandfather     No Known Problems Paternal Grandmother     No Known Problems Paternal Grandfather     Pancreatic cancer Brother     No Known Problems Maternal Aunt     No Known Problems Paternal Aunt     No Known Problems Paternal Aunt       reports that she quit smoking about 37 years ago  She has never used smokeless tobacco  She reports previous alcohol use  She reports that she does not use drugs  Physical Exam:   Vitals:    09/22/22 0848   BP: 150/90   Pulse: 74   SpO2: 99%   Weight: 94 7 kg (208 lb 12 8 oz)   Height: 5' 8" (1 727 m)     Body mass index is 31 75 kg/m²      General: conscious, cooperative, in no acute distress, appears stated age  Eyes: conjunctivae pale, anicteric sclerae  ENT: lips and mucous membranes moist  Neck: supple, no JVD, no masses  Chest:  essentially clear breath sounds bilaterally, no crackles, ronchus or wheezings  CVS: S1 & S2, normal rate, regular rhythm  Abdomen: soft, non-tender, non-distended, normoactive bowel sounds, rounded, obese  Extremities: no edema of both legs  Skin: no rash   Neuro: awake, alert, oriented Diagnostic Data:  Lab: I have personally reviewed pertinent lab results  ,   CBC:       CMP: No results found for: SODIUM, K, CL, CO2, ANIONGAP, BUN, CREATININE, GLUCOSE, CALCIUM, AST, ALT, ALKPHOS, PROT, BILITOT, EGFR,   PT/INR: No results found for: PT, INR,   Magnesium: No components found for: MAG,  Phosphorous: No results found for: PHOS    Patient Instructions   Decrease CoQ 10 to 50 mg every other day        Portions of the record may have been created with voice recognition software  Occasional wrong word or "sound a like" substitutions may have occurred due to the inherent limitations of voice recognition software  Read the chart carefully and recognize, using context, where substitutions have occurred  If you have any questions, please contact the dictating provider

## 2022-10-10 ENCOUNTER — RA CDI HCC (OUTPATIENT)
Dept: OTHER | Facility: HOSPITAL | Age: 72
End: 2022-10-10

## 2022-10-10 NOTE — PROGRESS NOTES
[address]  coding opportunities       Chart reviewed, no opportunity found:   [address]        Patients Insurance     Medicare Insurance: [employer]

## 2022-10-11 ENCOUNTER — OFFICE VISIT (OUTPATIENT)
Dept: INTERNAL MEDICINE CLINIC | Facility: CLINIC | Age: 72
End: 2022-10-11
Payer: COMMERCIAL

## 2022-10-11 VITALS
BODY MASS INDEX: 32.39 KG/M2 | WEIGHT: 213 LBS | TEMPERATURE: 97.7 F | HEART RATE: 68 BPM | OXYGEN SATURATION: 97 % | DIASTOLIC BLOOD PRESSURE: 88 MMHG | SYSTOLIC BLOOD PRESSURE: 130 MMHG

## 2022-10-11 DIAGNOSIS — D73.89 SPLENIC LESION: ICD-10-CM

## 2022-10-11 DIAGNOSIS — E55.9 VITAMIN D DEFICIENCY: ICD-10-CM

## 2022-10-11 DIAGNOSIS — E27.8 ADRENAL NODULE (HCC): ICD-10-CM

## 2022-10-11 DIAGNOSIS — N18.31 STAGE 3A CHRONIC KIDNEY DISEASE (HCC): ICD-10-CM

## 2022-10-11 DIAGNOSIS — Z00.00 MEDICARE ANNUAL WELLNESS VISIT, SUBSEQUENT: Primary | ICD-10-CM

## 2022-10-11 DIAGNOSIS — R93.5 ABNORMAL CT OF THE ABDOMEN: ICD-10-CM

## 2022-10-11 DIAGNOSIS — Z23 ENCOUNTER FOR ADMINISTRATION OF VACCINE: ICD-10-CM

## 2022-10-11 DIAGNOSIS — F41.8 ANXIETY ABOUT HEALTH: ICD-10-CM

## 2022-10-11 DIAGNOSIS — I10 ESSENTIAL HYPERTENSION: ICD-10-CM

## 2022-10-11 DIAGNOSIS — M16.11 ARTHRITIS OF RIGHT HIP: ICD-10-CM

## 2022-10-11 DIAGNOSIS — R93.89 THICKENED ENDOMETRIUM: ICD-10-CM

## 2022-10-11 DIAGNOSIS — E78.00 PURE HYPERCHOLESTEROLEMIA: ICD-10-CM

## 2022-10-11 DIAGNOSIS — Z13.1 SCREENING FOR DIABETES MELLITUS: ICD-10-CM

## 2022-10-11 DIAGNOSIS — E66.09 CLASS 1 OBESITY DUE TO EXCESS CALORIES WITHOUT SERIOUS COMORBIDITY WITH BODY MASS INDEX (BMI) OF 31.0 TO 31.9 IN ADULT: ICD-10-CM

## 2022-10-11 DIAGNOSIS — M70.71 ILIOPSOAS BURSITIS OF RIGHT HIP: ICD-10-CM

## 2022-10-11 DIAGNOSIS — M17.11 ARTHRITIS OF RIGHT KNEE: ICD-10-CM

## 2022-10-11 DIAGNOSIS — Z85.9 HISTORY OF MALIGNANT NEUROENDOCRINE TUMOR: ICD-10-CM

## 2022-10-11 DIAGNOSIS — M43.10 RETROLISTHESIS OF VERTEBRAE: ICD-10-CM

## 2022-10-11 DIAGNOSIS — K52.9 COLITIS: ICD-10-CM

## 2022-10-11 DIAGNOSIS — K76.89 HEPATIC CYST: ICD-10-CM

## 2022-10-11 DIAGNOSIS — Z23 ENCOUNTER FOR VACCINATION: ICD-10-CM

## 2022-10-11 PROBLEM — Z92.89 HISTORY OF ENDOMETRIAL BIOPSY: Status: ACTIVE | Noted: 2022-10-11

## 2022-10-11 PROCEDURE — 90732 PPSV23 VACC 2 YRS+ SUBQ/IM: CPT | Performed by: NURSE PRACTITIONER

## 2022-10-11 PROCEDURE — G0009 ADMIN PNEUMOCOCCAL VACCINE: HCPCS | Performed by: NURSE PRACTITIONER

## 2022-10-11 PROCEDURE — G0439 PPPS, SUBSEQ VISIT: HCPCS | Performed by: NURSE PRACTITIONER

## 2022-10-11 PROCEDURE — 90662 IIV NO PRSV INCREASED AG IM: CPT | Performed by: NURSE PRACTITIONER

## 2022-10-11 PROCEDURE — G0008 ADMIN INFLUENZA VIRUS VAC: HCPCS | Performed by: NURSE PRACTITIONER

## 2022-10-11 PROCEDURE — 99214 OFFICE O/P EST MOD 30 MIN: CPT | Performed by: NURSE PRACTITIONER

## 2022-10-11 NOTE — ASSESSMENT & PLAN NOTE
· 9/22/2022 Nephrology  ? Blood pressure goal < 130/80 mmHg  Home log reviewed-all appropriate and within goal  As white coat hypertension  No changes made     ? Low sodium diet with increased dietary potassium  · 8/8/2022  · Positive White Coat Syndrome  · 8/2/2022 - 148/80  · 8/3/2022 - 136/80  · 8/4/2022 - 147/78; 141/80  · 8/5/2022 - 145/76  · 8/6/2022 - 132/76  · 8/7/2022 - 133/76; 130/72  · 8/8/2022- 133/80  · Continue lisinopril, hydralazine, metoprolol  · 10/11/2022 PCP  · Continue hydralazine, lisinopril, metoprolol tartrate  · List of blood pressure readings:  · 9/23: 137/76  · 9/24: 138/82  · 9/25: 136/80  · 9/27: 139/76  · 9/30: 131/80  · 10/3: 122/72  · 10/4: 129/73  · 10/5: 130/76  · 10/6: 136/74  · 10/7: 124/71  · 10/8: 130/77  · 10/9: 132/74  · 10/10: 120/74  · 10/11: 128/75

## 2022-10-11 NOTE — ASSESSMENT & PLAN NOTE
· 3/5/2022 Xray bilateral Hips  FINDINGS:   There is no acute fracture or dislocation    Persistent moderate to severe degenerative arthritis right hip    No lytic or blastic osseous lesion    Soft tissues are unremarkable    Persistent multilevel degenerative lumbar spondylosis

## 2022-10-11 NOTE — PATIENT INSTRUCTIONS
Medicare Preventive Visit Patient Instructions  Thank you for completing your Welcome to Medicare Visit or Medicare Annual Wellness Visit today  Your next wellness visit will be due in one year (10/12/2023)  The screening/preventive services that you may require over the next 5-10 years are detailed below  Some tests may not apply to you based off risk factors and/or age  Screening tests ordered at today's visit but not completed yet may show as past due  Also, please note that scanned in results may not display below  Preventive Screenings:  Service Recommendations Previous Testing/Comments   Colorectal Cancer Screening  * Colonoscopy    * Fecal Occult Blood Test (FOBT)/Fecal Immunochemical Test (FIT)  * Fecal DNA/Cologuard Test  * Flexible Sigmoidoscopy Age: 39-70 years old   Colonoscopy: every 10 years (may be performed more frequently if at higher risk)  OR  FOBT/FIT: every 1 year  OR  Cologuard: every 3 years  OR  Sigmoidoscopy: every 5 years  Screening may be recommended earlier than age 39 if at higher risk for colorectal cancer  Also, an individualized decision between you and your healthcare provider will decide whether screening between the ages of 74-80 would be appropriate  Colonoscopy: 01/07/2020  FOBT/FIT: Not on file  Cologuard: Not on file  Sigmoidoscopy: Not on file    History Colorectal Cancer     Breast Cancer Screening Age: 37 years old  Frequency: every 1-2 years  Not required if history of left and right mastectomy Mammogram: 05/11/2022    Screening Current   Cervical Cancer Screening Between the ages of 21-29, pap smear recommended once every 3 years  Between the ages of 33-67, can perform pap smear with HPV co-testing every 5 years     Recommendations may differ for women with a history of total hysterectomy, cervical cancer, or abnormal pap smears in past  Pap Smear: 12/08/2020    Screening Not Indicated   Hepatitis C Screening Once for adults born between 1945 and 1965  More frequently in patients at high risk for Hepatitis C Hep C Antibody: Not on file        Diabetes Screening 1-2 times per year if you're at risk for diabetes or have pre-diabetes Fasting glucose: 94 mg/dL (9/15/2022)  A1C: 5 5 % (2/24/2021)  Screening Current   Cholesterol Screening Once every 5 years if you don't have a lipid disorder  May order more often based on risk factors  Lipid panel: 02/24/2021    Screening Not Indicated  History Lipid Disorder     Other Preventive Screenings Covered by Medicare:  Abdominal Aortic Aneurysm (AAA) Screening: covered once if your at risk  You're considered to be at risk if you have a family history of AAA  Lung Cancer Screening: covers low dose CT scan once per year if you meet all of the following conditions: (1) Age 50-69; (2) No signs or symptoms of lung cancer; (3) Current smoker or have quit smoking within the last 15 years; (4) You have a tobacco smoking history of at least 20 pack years (packs per day multiplied by number of years you smoked); (5) You get a written order from a healthcare provider  Glaucoma Screening: covered annually if you're considered high risk: (1) You have diabetes OR (2) Family history of glaucoma OR (3)  aged 48 and older OR (3)  American aged 72 and older  Osteoporosis Screening: covered every 2 years if you meet one of the following conditions: (1) You're estrogen deficient and at risk for osteoporosis based off medical history and other findings; (2) Have a vertebral abnormality; (3) On glucocorticoid therapy for more than 3 months; (4) Have primary hyperparathyroidism; (5) On osteoporosis medications and need to assess response to drug therapy  Last bone density test (DXA Scan): 05/11/2022  HIV Screening: covered annually if you're between the age of 12-76  Also covered annually if you are younger than 13 and older than 72 with risk factors for HIV infection   For pregnant patients, it is covered up to 3 times per pregnancy  Immunizations:  Immunization Recommendations   Influenza Vaccine Annual influenza vaccination during flu season is recommended for all persons aged >= 6 months who do not have contraindications   Pneumococcal Vaccine   * Pneumococcal conjugate vaccine = PCV13 (Prevnar 13), PCV15 (Vaxneuvance), PCV20 (Prevnar 20)  * Pneumococcal polysaccharide vaccine = PPSV23 (Pneumovax) Adults 25-60 years old: 1-3 doses may be recommended based on certain risk factors  Adults 72 years old: 1-2 doses may be recommended based off what pneumonia vaccine you previously received   Hepatitis B Vaccine 3 dose series if at intermediate or high risk (ex: diabetes, end stage renal disease, liver disease)   Tetanus (Td) Vaccine - COST NOT COVERED BY MEDICARE PART B Following completion of primary series, a booster dose should be given every 10 years to maintain immunity against tetanus  Td may also be given as tetanus wound prophylaxis  Tdap Vaccine - COST NOT COVERED BY MEDICARE PART B Recommended at least once for all adults  For pregnant patients, recommended with each pregnancy  Shingles Vaccine (Shingrix) - COST NOT COVERED BY MEDICARE PART B  2 shot series recommended in those aged 48 and above     Health Maintenance Due:      Topic Date Due    Hepatitis C Screening  Never done    Breast Cancer Screening: Mammogram  05/11/2023     Immunizations Due:      Topic Date Due    Pneumococcal Vaccine: 65+ Years (2 - PPSV23 or PCV20) 05/10/2019    COVID-19 Vaccine (2 - Carmelo risk series) 05/04/2021    Influenza Vaccine (1) 09/01/2022     Advance Directives   What are advance directives? Advance directives are legal documents that state your wishes and plans for medical care  These plans are made ahead of time in case you lose your ability to make decisions for yourself  Advance directives can apply to any medical decision, such as the treatments you want, and if you want to donate organs     What are the types of advance directives? There are many types of advance directives, and each state has rules about how to use them  You may choose a combination of any of the following:  Living will: This is a written record of the treatment you want  You can also choose which treatments you do not want, which to limit, and which to stop at a certain time  This includes surgery, medicine, IV fluid, and tube feedings  Durable power of  for healthcare [facility] [place]): This is a written record that states who you want to make healthcare choices for you when you are unable to make them for yourself  This person, called a proxy, is usually a family member or a friend  You may choose more than 1 proxy  Do not resuscitate (DNR) order:  A DNR order is used in case your heart stops beating or you stop breathing  It is a request not to have certain forms of treatment, such as CPR  A DNR order may be included in other types of advance directives  Medical directive: This covers the care that you want if you are in a coma, near death, or unable to make decisions for yourself  You can list the treatments you want for each condition  Treatment may include pain medicine, surgery, blood transfusions, dialysis, IV or tube feedings, and a ventilator (breathing machine)  Values history: This document has questions about your views, beliefs, and how you feel and think about life  This information can help others choose the care that you would choose  Why are advance directives important? An advance directive helps you control your care  Although spoken wishes may be used, it is better to have your wishes written down  Spoken wishes can be misunderstood, or not followed  Treatments may be given even if you do not want them  An advance directive may make it easier for your family to make difficult choices about your care     Weight Management   Why it is important to manage your weight:  Being overweight increases your risk of health conditions such as heart disease, high blood pressure, type 2 diabetes, and certain types of cancer  It can also increase your risk for osteoarthritis, sleep apnea, and other respiratory problems  Aim for a slow, steady weight loss  Even a small amount of weight loss can lower your risk of health problems  How to lose weight safely:  A safe and healthy way to lose weight is to eat fewer calories and get regular exercise  You can lose up about 1 pound a week by decreasing the number of calories you eat by 500 calories each day  Healthy meal plan for weight management:  A healthy meal plan includes a variety of foods, contains fewer calories, and helps you stay healthy  A healthy meal plan includes the following:  Eat whole-grain foods more often  A healthy meal plan should contain fiber  Fiber is the part of grains, fruits, and vegetables that is not broken down by your body  Whole-grain foods are healthy and provide extra fiber in your diet  Some examples of whole-grain foods are whole-wheat breads and pastas, oatmeal, brown rice, and bulgur  Eat a variety of vegetables every day  Include dark, leafy greens such as spinach, kale, khanh greens, and mustard greens  Eat yellow and orange vegetables such as carrots, sweet potatoes, and winter squash  Eat a variety of fruits every day  Choose fresh or canned fruit (canned in its own juice or light syrup) instead of juice  Fruit juice has very little or no fiber  Eat low-fat dairy foods  Drink fat-free (skim) milk or 1% milk  Eat fat-free yogurt and low-fat cottage cheese  Try low-fat cheeses such as mozzarella and other reduced-fat cheeses  Choose meat and other protein foods that are low in fat  Choose beans or other legumes such as split peas or lentils  Choose fish, skinless poultry (chicken or turkey), or lean cuts of red meat (beef or pork)  Before you cook meat or poultry, cut off any visible fat  Use less fat and oil  Try baking foods instead of frying them   Add less fat, such as margarine, sour cream, regular salad dressing and mayonnaise to foods  Eat fewer high-fat foods  Some examples of high-fat foods include french fries, doughnuts, ice cream, and cakes  Eat fewer sweets  Limit foods and drinks that are high in sugar  This includes candy, cookies, regular soda, and sweetened drinks  Exercise:  Exercise at least 30 minutes per day on most days of the week  Some examples of exercise include walking, biking, dancing, and swimming  You can also fit in more physical activity by taking the stairs instead of the elevator or parking farther away from stores  Ask your healthcare provider about the best exercise plan for you  © Copyright [employer] 2018 Information is for End User's use only and may not be sold, redistributed or otherwise used for commercial purposes  All illustrations and images included in CareNotes® are the copyrighted property of [employer]  or Ofelia Feliz      Problem List Items Addressed This Visit          Digestive    Colitis    Hepatic cyst     6/17/2022 CT Abdomen and Pelvis w/ Contrast  FINDINGS:  ABDOMEN   LOWER CHEST:    No clinically significant abnormality identified in the visualized lower chest    LIVER/BILIARY TREE:    Stable 3 mm right anterior hepatic subcentimeter hypodensity present as far back as November 2017 attributed to a cyst  Liver otherwise unremarkable  No duct dilation  GALLBLADDER:    No calcified gallstones  No pericholecystic inflammatory change  SPLEEN:    Stable 2 cm splenic hypodense lesion present as far back as November 2017 compatible with a benign finding  Spleen otherwise unremarkable  PANCREAS:    Unremarkable  ADRENAL GLANDS:    Stable 1 3 cm right adrenal nodule  Stable minimal thickening of the left adrenal gland with punctate calcifications  KIDNEYS/URETERS:    Unremarkable  No hydronephrosis     STOMACH AND BOWEL:    Post partial small bowel resection with unremarkable enteroenteric anastomosis in the right mid abdomen  No bowel obstruction  Single mid sigmoid diverticulum without diverticulitis  APPENDIX:    A normal appendix was visualized  ABDOMINOPELVIC CAVITY:  No ascites  No pneumoperitoneum  No lymphadenopathy  Stable 1 2 cm fat density nodule with soft tissue rim posterior to the lower uterine segment and left lateral to the rectum  VESSELS:    Aortoiliac calcification  No aneurysm  PELVIS   REPRODUCTIVE ORGANS:    Unremarkable for patient's age  URINARY BLADDER:    Unremarkable  ABDOMINAL WALL/INGUINAL REGIONS:    Minimal ventral midline supraumbilical postsurgical scar  Chronic right iliopsoas bursitis  OSSEOUS STRUCTURES:    No acute fracture or osseous destructive lesion identified  Degenerative changes of the spine, pubic symphysis, and multiple joints  Stable minimal grade 1 degenerative retrolisthesis of L5 on S1    IMPRESSION:   No evidence of recurrent or abdominopelvic metastatic disease  Cardiovascular and Mediastinum    Essential hypertension     9/22/2022 Nephrology  Blood pressure goal < 130/80 mmHg  Home log reviewed-all appropriate and within goal  As white coat hypertension  No changes made  Low sodium diet with increased dietary potassium  8/8/2022  Positive White Coat Syndrome  8/2/2022 - 148/80  8/3/2022 - 136/80  8/4/2022 - 147/78; 141/80  8/5/2022 - 145/76  8/6/2022 - 132/76  8/7/2022 - 133/76; 130/72  8/8/2022- 133/80  Continue lisinopril, hydralazine, metoprolol  10/11/2022 PCP  Continue hydralazine, lisinopril, metoprolol tartrate  List of blood pressure readings:  9/23: 137/76  9/24: 138/82  9/25: 136/80  9/27: 139/76  9/30: 131/80  10/3: 122/72  10/4: 129/73  10/5: 130/76  10/6: 136/74  10/7: 124/71  10/8: 130/77  10/9: 132/74  10/10: 120/74  10/11: 128/75                Musculoskeletal and Integument    Arthritis of right hip     3/5/2022 Xray bilateral Hips  FINDINGS:   There is no acute fracture or dislocation     Persistent moderate to severe degenerative arthritis right hip  No lytic or blastic osseous lesion  Soft tissues are unremarkable  Persistent multilevel degenerative lumbar spondylosis         Arthritis of right knee     3/5/2022 xray right knee  IMPRESSION:  Minimal tricompartmental degenerative arthritis           Iliopsoas bursitis of right hip     6/17/2022 CT Abdomen and Pelvis w/ Contrast  FINDINGS:  ABDOMEN   LOWER CHEST:    No clinically significant abnormality identified in the visualized lower chest    LIVER/BILIARY TREE:    Stable 3 mm right anterior hepatic subcentimeter hypodensity present as far back as November 2017 attributed to a cyst  Liver otherwise unremarkable  No duct dilation  GALLBLADDER:    No calcified gallstones  No pericholecystic inflammatory change  SPLEEN:    Stable 2 cm splenic hypodense lesion present as far back as November 2017 compatible with a benign finding  Spleen otherwise unremarkable  PANCREAS:    Unremarkable  ADRENAL GLANDS:    Stable 1 3 cm right adrenal nodule  Stable minimal thickening of the left adrenal gland with punctate calcifications  KIDNEYS/URETERS:    Unremarkable  No hydronephrosis  STOMACH AND BOWEL:    Post partial small bowel resection with unremarkable enteroenteric anastomosis in the right mid abdomen  No bowel obstruction  Single mid sigmoid diverticulum without diverticulitis  APPENDIX:    A normal appendix was visualized  ABDOMINOPELVIC CAVITY:  No ascites  No pneumoperitoneum  No lymphadenopathy  Stable 1 2 cm fat density nodule with soft tissue rim posterior to the lower uterine segment and left lateral to the rectum  VESSELS:    Aortoiliac calcification  No aneurysm  PELVIS   REPRODUCTIVE ORGANS:    Unremarkable for patient's age  URINARY BLADDER:    Unremarkable  ABDOMINAL WALL/INGUINAL REGIONS:    Minimal ventral midline supraumbilical postsurgical scar  Chronic right iliopsoas bursitis     OSSEOUS STRUCTURES:    No acute fracture or osseous destructive lesion identified  Degenerative changes of the spine, pubic symphysis, and multiple joints  Stable minimal grade 1 degenerative retrolisthesis of L5 on S1    IMPRESSION:   No evidence of recurrent or abdominopelvic metastatic disease  Retrolisthesis of vertebrae     6/17/2022 CT Abdomen and Pelvis w/ Contrast  FINDINGS:  ABDOMEN   LOWER CHEST:    No clinically significant abnormality identified in the visualized lower chest    LIVER/BILIARY TREE:    Stable 3 mm right anterior hepatic subcentimeter hypodensity present as far back as November 2017 attributed to a cyst  Liver otherwise unremarkable  No duct dilation  GALLBLADDER:    No calcified gallstones  No pericholecystic inflammatory change  SPLEEN:    Stable 2 cm splenic hypodense lesion present as far back as November 2017 compatible with a benign finding  Spleen otherwise unremarkable  PANCREAS:    Unremarkable  ADRENAL GLANDS:    Stable 1 3 cm right adrenal nodule  Stable minimal thickening of the left adrenal gland with punctate calcifications  KIDNEYS/URETERS:    Unremarkable  No hydronephrosis  STOMACH AND BOWEL:    Post partial small bowel resection with unremarkable enteroenteric anastomosis in the right mid abdomen  No bowel obstruction  Single mid sigmoid diverticulum without diverticulitis  APPENDIX:    A normal appendix was visualized  ABDOMINOPELVIC CAVITY:  No ascites  No pneumoperitoneum  No lymphadenopathy  Stable 1 2 cm fat density nodule with soft tissue rim posterior to the lower uterine segment and left lateral to the rectum  VESSELS:    Aortoiliac calcification  No aneurysm  PELVIS   REPRODUCTIVE ORGANS:    Unremarkable for patient's age  URINARY BLADDER:    Unremarkable  ABDOMINAL WALL/INGUINAL REGIONS:    Minimal ventral midline supraumbilical postsurgical scar  Chronic right iliopsoas bursitis     OSSEOUS STRUCTURES:    No acute fracture or osseous destructive lesion identified  Degenerative changes of the spine, pubic symphysis, and multiple joints  Stable minimal grade 1 degenerative retrolisthesis of L5 on S1    IMPRESSION:   No evidence of recurrent or abdominopelvic metastatic disease  Genitourinary    Stage 3a chronic kidney disease ([facility] [address] )     Lab Results   Component Value Date    EGFR 48 09/15/2022    EGFR 54 06/09/2022    EGFR 55 02/25/2022    CREATININE 1 14 09/15/2022    CREATININE 1 04 06/09/2022    CREATININE 1 02 02/25/2022 9/22/2022 Nephrology  Baseline creatinine around 1 0-1 15 mg/dL per  records dating back to 2018  Normal kidney/bladder anatomy, bland urine  Etiology possibly due to hypertensive nephrosclerosis, age-related nephron loss, ? Obesity related issues  Blood pressure log reviewed, blood pressures appropriate and within goal < 130/80 mmHg  On lisinopril  Continue to avoid potential nephrotoxins, NSAIDs  Advised weight loss  Return to office in 6 months        -     Comprehensive metabolic panel; Future; Expected date: 03/01/2023        -     CBC and differential; Future; Expected date: 03/01/2023        -     Microalbumin / creatinine urine ratio; Future; Expected date: 03/01/2023        -     Urinalysis with microscopic; Future; Expected date: 03/01/2023        -     Protein / creatinine ratio, urine; Future; Expected date: 03/01/2023            Other    Pure hypercholesterolemia     Continue crestor           Relevant Orders    Lipid panel    Encounter for administration of vaccine     10/11/2022  Flu vaccines administered            History of malignant neuroendocrine tumor     6/28/2022 Surgical Oncology  3/24/2020 Initial Diagnosis     Primary malignant neuroendocrine tumor of small intestine ([facility] )      4/29/2020 Surgery     Small bowel and mesenteric mass, resection:       - Well-differentiated neuroendocrine tumor, 2 4 cm, G2        - Lymph-vascular and perineural invasion are identified         - 4 of 13 lymph nodes positive for metastatic well-differentiated neuroendocrine tumor (4/13)  - Largest lymph node deposit: 1 5 cm with extra-emigdio extension, (mesenteric mass)  - 3 mitotic figures per 2 mm2, (consistent with grade G2)  - The tumor is extremely close to the serosal surface (approximately   0003 cm from serosal surface)  Radiation     The patient saw [unfilled] for radiation treatment  This is the current list of radiation treatment:      Radiation Treatments      No radiation treatments to show  (Treatments may have been administered in another system )                Chemotherapy     [No treatment plan]          Staging: [id number] well-differentiated neuroendocrine tumor of the ileum, April 2020  Treatment history:  Small-bowel resection with mesenteric nodes, April 2020  Current treatment:  Observation  Disease status: KAREN  Stable adrenal nodule           Adrenal nodule (HCC)     6/17/2022 CT Abdomen and Pelvis w/ Contrast  FINDINGS:  ABDOMEN   LOWER CHEST:    No clinically significant abnormality identified in the visualized lower chest    LIVER/BILIARY TREE:    Stable 3 mm right anterior hepatic subcentimeter hypodensity present as far back as November 2017 attributed to a cyst  Liver otherwise unremarkable  No duct dilation  GALLBLADDER:    No calcified gallstones  No pericholecystic inflammatory change  SPLEEN:    Stable 2 cm splenic hypodense lesion present as far back as November 2017 compatible with a benign finding  Spleen otherwise unremarkable  PANCREAS:    Unremarkable  ADRENAL GLANDS:    Stable 1 3 cm right adrenal nodule  Stable minimal thickening of the left adrenal gland with punctate calcifications  KIDNEYS/URETERS:    Unremarkable  No hydronephrosis  STOMACH AND BOWEL:    Post partial small bowel resection with unremarkable enteroenteric anastomosis in the right mid abdomen  No bowel obstruction  Single mid sigmoid diverticulum without diverticulitis     APPENDIX:    A normal appendix was visualized  ABDOMINOPELVIC CAVITY:  No ascites  No pneumoperitoneum  No lymphadenopathy  Stable 1 2 cm fat density nodule with soft tissue rim posterior to the lower uterine segment and left lateral to the rectum  VESSELS:    Aortoiliac calcification  No aneurysm  PELVIS   REPRODUCTIVE ORGANS:    Unremarkable for patient's age  URINARY BLADDER:    Unremarkable  ABDOMINAL WALL/INGUINAL REGIONS:    Minimal ventral midline supraumbilical postsurgical scar  Chronic right iliopsoas bursitis  OSSEOUS STRUCTURES:    No acute fracture or osseous destructive lesion identified  Degenerative changes of the spine, pubic symphysis, and multiple joints  Stable minimal grade 1 degenerative retrolisthesis of L5 on S1    IMPRESSION:   No evidence of recurrent or abdominopelvic metastatic disease  Thickened endometrium    Medicare annual wellness visit, subsequent - Primary     Lifestyle modification, diet and exercise discussed  Medications discussed and refilled appropriately  Labs discussed and ordered   Hepatitis C screening discussed  HIV screening discussed  Depression screening performed  Anxiety screening performed  Urinary Incontinence screening performed   BMI discussed  Mammogram ordered  Fall screening performed           Anxiety about health     Continue xanax  This patient was educated on side effects, risks of taking this type of medication which include abuse, misuse, dependence, addiction and tolerance  All questions were answered including different dosing levels, increasing and decreasing of of this medication  We discussed their continued open discussions with the PCP in order to make sure that they are on the correct dose       We reviewed the potential side effects of the medications including, but are not limited to, constipation, diarrhea, nausea/upset stomach, drowsiness, fatigue, dizziness, urinary retention, visual changes, insomnia, headaches, nightmares, slowed breathing while sleeping, UTI issues, impaired judgment, addiction issues and the risk of fatal overdose if not taken as prescribed  We discussed the importance of notifying the office/provider immediately of any side effects  We discussed the importance of immediate notification if there are any feelings of suicidality thoughts or behaviors   We discussed the importance of contacting the office if he has any tachycardia or fainting situations  The patient was warned against driving while taking sedation medications  We discussed that sharing medications is a felony  We discussed other side effects such as QT prolongation, risks of Myocardial Infarction (heart attack), stroke and sudden death  We discussed immediate notification if there is any seizure-like activity  We discussed issues with angioedema as an anaphylactic reactions  I have personally reviewed the [employer] [facility] ([facility]) website prior to prescribing this medication  The patient understands and agrees to use these medications only as prescribed  At this point in time, the patient is showing no signs of addiction, abuse, diversion or suicidal ideation  All the patient's questions were answered to their satisfaction  [place] Prescription Drug Monitoring Program report was reviewed and was appropriate   All the patient's questions were answered to his/her satisfaction             Vitamin D deficiency     Continue supplementation  Check labs           Class 1 obesity due to excess calories without serious comorbidity with body mass index (BMI) of 31 0 to 31 9 in adult     Lifestyle modification, diet and exercise discussed            Abnormal CT of the abdomen     6/17/2022 CT Abdomen and Pelvis w/ Contrast  FINDINGS:  ABDOMEN   LOWER CHEST:    No clinically significant abnormality identified in the visualized lower chest    LIVER/BILIARY TREE:    Stable 3 mm right anterior hepatic subcentimeter hypodensity present as far back as November 2017 attributed to a cyst  Liver otherwise unremarkable  No duct dilation  GALLBLADDER:    No calcified gallstones  No pericholecystic inflammatory change  SPLEEN:    Stable 2 cm splenic hypodense lesion present as far back as November 2017 compatible with a benign finding  Spleen otherwise unremarkable  PANCREAS:    Unremarkable  ADRENAL GLANDS:    Stable 1 3 cm right adrenal nodule  Stable minimal thickening of the left adrenal gland with punctate calcifications  KIDNEYS/URETERS:    Unremarkable  No hydronephrosis  STOMACH AND BOWEL:    Post partial small bowel resection with unremarkable enteroenteric anastomosis in the right mid abdomen  No bowel obstruction  Single mid sigmoid diverticulum without diverticulitis  APPENDIX:    A normal appendix was visualized  ABDOMINOPELVIC CAVITY:  No ascites  No pneumoperitoneum  No lymphadenopathy  Stable 1 2 cm fat density nodule with soft tissue rim posterior to the lower uterine segment and left lateral to the rectum  VESSELS:    Aortoiliac calcification  No aneurysm  PELVIS   REPRODUCTIVE ORGANS:    Unremarkable for patient's age  URINARY BLADDER:    Unremarkable  ABDOMINAL WALL/INGUINAL REGIONS:    Minimal ventral midline supraumbilical postsurgical scar  Chronic right iliopsoas bursitis  OSSEOUS STRUCTURES:    No acute fracture or osseous destructive lesion identified  Degenerative changes of the spine, pubic symphysis, and multiple joints  Stable minimal grade 1 degenerative retrolisthesis of L5 on S1    IMPRESSION:   No evidence of recurrent or abdominopelvic metastatic disease           Splenic lesion     6/17/2022 CT Abdomen and Pelvis w/ Contrast  FINDINGS:  ABDOMEN   LOWER CHEST:    No clinically significant abnormality identified in the visualized lower chest    LIVER/BILIARY TREE:    Stable 3 mm right anterior hepatic subcentimeter hypodensity present as far back as November 2017 attributed to a cyst  Liver otherwise unremarkable  No duct dilation  GALLBLADDER:    No calcified gallstones  No pericholecystic inflammatory change  SPLEEN:    Stable 2 cm splenic hypodense lesion present as far back as November 2017 compatible with a benign finding  Spleen otherwise unremarkable  PANCREAS:    Unremarkable  ADRENAL GLANDS:    Stable 1 3 cm right adrenal nodule  Stable minimal thickening of the left adrenal gland with punctate calcifications  KIDNEYS/URETERS:    Unremarkable  No hydronephrosis  STOMACH AND BOWEL:    Post partial small bowel resection with unremarkable enteroenteric anastomosis in the right mid abdomen  No bowel obstruction  Single mid sigmoid diverticulum without diverticulitis  APPENDIX:    A normal appendix was visualized  ABDOMINOPELVIC CAVITY:  No ascites  No pneumoperitoneum  No lymphadenopathy  Stable 1 2 cm fat density nodule with soft tissue rim posterior to the lower uterine segment and left lateral to the rectum  VESSELS:    Aortoiliac calcification  No aneurysm  PELVIS   REPRODUCTIVE ORGANS:    Unremarkable for patient's age  URINARY BLADDER:    Unremarkable  ABDOMINAL WALL/INGUINAL REGIONS:    Minimal ventral midline supraumbilical postsurgical scar  Chronic right iliopsoas bursitis  OSSEOUS STRUCTURES:    No acute fracture or osseous destructive lesion identified  Degenerative changes of the spine, pubic symphysis, and multiple joints  Stable minimal grade 1 degenerative retrolisthesis of L5 on S1    IMPRESSION:   No evidence of recurrent or abdominopelvic metastatic disease           Screening for diabetes mellitus    Relevant Orders    HEMOGLOBIN A1C W/ EAG ESTIMATION    Encounter for vaccination    Relevant Orders    influenza vaccine, high-dose, PF 0 7 mL (FLUZONE HIGH-DOSE)

## 2022-10-11 NOTE — ASSESSMENT & PLAN NOTE
· Continue xanax  This patient was educated on side effects, risks of taking this type of medication which include abuse, misuse, dependence, addiction and tolerance  All questions were answered including different dosing levels, increasing and decreasing of of this medication  We discussed their continued open discussions with the PCP in order to make sure that they are on the correct dose  We reviewed the potential side effects of the medications including, but are not limited to, constipation, diarrhea, nausea/upset stomach, drowsiness, fatigue, dizziness, urinary retention, visual changes, insomnia, headaches, nightmares, slowed breathing while sleeping, UTI issues, impaired judgment, addiction issues and the risk of fatal overdose if not taken as prescribed  We discussed the importance of notifying the office/provider immediately of any side effects  We discussed the importance of immediate notification if there are any feelings of suicidality thoughts or behaviors   We discussed the importance of contacting the office if he has any tachycardia or fainting situations  The patient was warned against driving while taking sedation medications  We discussed that sharing medications is a felony  We discussed other side effects such as QT prolongation, risks of Myocardial Infarction (heart attack), stroke and sudden death  We discussed immediate notification if there is any seizure-like activity  We discussed issues with angioedema as an anaphylactic reactions  I have personally reviewed the [employer] [facility] (PDMP[facility] website prior to prescribing this medication  The patient understands and agrees to use these medications only as prescribed  At this point in time, the patient is showing no signs of addiction, abuse, diversion or suicidal ideation  All the patient's questions were answered to their satisfaction    [place] Prescription Drug Monitoring Program report was reviewed and was appropriate   All the patient's questions were answered to his/her satisfaction

## 2022-10-11 NOTE — ASSESSMENT & PLAN NOTE
· 6/17/2022 CT Abdomen and Pelvis w/ Contrast  FINDINGS:  ABDOMEN   LOWER CHEST:    No clinically significant abnormality identified in the visualized lower chest    LIVER/BILIARY TREE:    Stable 3 mm right anterior hepatic subcentimeter hypodensity present as far back as November 2017 attributed to a cyst  Liver otherwise unremarkable  No duct dilation    GALLBLADDER:    No calcified gallstones  No pericholecystic inflammatory change    SPLEEN:    Stable 2 cm splenic hypodense lesion present as far back as November 2017 compatible with a benign finding  Spleen otherwise unremarkable    PANCREAS:    Unremarkable    ADRENAL GLANDS:    Stable 1 3 cm right adrenal nodule  Stable minimal thickening of the left adrenal gland with punctate calcifications    KIDNEYS/URETERS:    Unremarkable  No hydronephrosis    STOMACH AND BOWEL:    Post partial small bowel resection with unremarkable enteroenteric anastomosis in the right mid abdomen  No bowel obstruction  Single mid sigmoid diverticulum without diverticulitis    APPENDIX:    A normal appendix was visualized    ABDOMINOPELVIC CAVITY:  No ascites  No pneumoperitoneum  No lymphadenopathy  Stable 1 2 cm fat density nodule with soft tissue rim posterior to the lower uterine segment and left lateral to the rectum    VESSELS:    Aortoiliac calcification  No aneurysm    PELVIS   REPRODUCTIVE ORGANS:    Unremarkable for patient's age    URINARY BLADDER:    Unremarkable    ABDOMINAL WALL/INGUINAL REGIONS:    Minimal ventral midline supraumbilical postsurgical scar  Chronic right iliopsoas bursitis    OSSEOUS STRUCTURES:    No acute fracture or osseous destructive lesion identified  Degenerative changes of the spine, pubic symphysis, and multiple joints  Stable minimal grade 1 degenerative retrolisthesis of L5 on S1    IMPRESSION:   No evidence of recurrent or abdominopelvic metastatic disease

## 2022-10-11 NOTE — ASSESSMENT & PLAN NOTE
Lab Results   Component Value Date    EGFR 48 09/15/2022    EGFR 54 06/09/2022    EGFR 55 02/25/2022    CREATININE 1 14 09/15/2022    CREATININE 1 04 06/09/2022    CREATININE 1 02 02/25/2022     · 9/22/2022 Nephrology  ? Baseline creatinine around 1 0-1 15 mg/dL per  records dating back to 2018  Normal kidney/bladder anatomy, bland urine  Etiology possibly due to hypertensive nephrosclerosis, age-related nephron loss, ? Obesity related issues  ? Blood pressure log reviewed, blood pressures appropriate and within goal < 130/80 mmHg  ? On lisinopril  ? Continue to avoid potential nephrotoxins, NSAIDs  ? Advised weight loss  ? Return to office in 6 months        -     Comprehensive metabolic panel; Future; Expected date: 03/01/2023        -     CBC and differential; Future; Expected date: 03/01/2023        -     Microalbumin / creatinine urine ratio; Future; Expected date: 03/01/2023        -     Urinalysis with microscopic; Future; Expected date: 03/01/2023        -     Protein / creatinine ratio, urine;  Future; Expected date: 03/01/2023

## 2022-10-11 NOTE — ASSESSMENT & PLAN NOTE
· 6/28/2022 Surgical Oncology  3/24/2020 Initial Diagnosis     Primary malignant neuroendocrine tumor of small intestine [facility])      4/29/2020 Surgery     Small bowel and mesenteric mass, resection:       - Well-differentiated neuroendocrine tumor, 2 4 cm, G2        - Lymph-vascular and perineural invasion are identified        - 4 of 13 lymph nodes positive for metastatic well-differentiated neuroendocrine tumor (4/13)       - Largest lymph node deposit: 1 5 cm with extra-emigdio extension, (mesenteric mass)       - 3 mitotic figures per 2 mm2, (consistent with grade G2)       - The tumor is extremely close to the serosal surface (approximately   0003 cm from serosal surface)        Radiation     The patient saw @[employer]@ for radiation treatment  This is the current list of radiation treatment:      Radiation Treatments      No radiation treatments to show  (Treatments may have been administered in another system )                Chemotherapy     [No treatment plan]          Staging: [id number] well-differentiated neuroendocrine tumor of the ileum, April 2020  Treatment history:  Small-bowel resection with mesenteric nodes, April 2020  Current treatment:  Observation  Disease status: KAREN    Stable adrenal nodule

## 2022-10-11 NOTE — PROGRESS NOTES
Assessment and Plan:     Problem List Items Addressed This Visit        Digestive    Colitis    Hepatic cyst     6/17/2022 CT Abdomen and Pelvis w/ Contrast  FINDINGS:  ABDOMEN   LOWER CHEST:    No clinically significant abnormality identified in the visualized lower chest    LIVER/BILIARY TREE:    Stable 3 mm right anterior hepatic subcentimeter hypodensity present as far back as November 2017 attributed to a cyst  Liver otherwise unremarkable  No duct dilation    GALLBLADDER:    No calcified gallstones  No pericholecystic inflammatory change    SPLEEN:    Stable 2 cm splenic hypodense lesion present as far back as November 2017 compatible with a benign finding  Spleen otherwise unremarkable    PANCREAS:    Unremarkable    ADRENAL GLANDS:    Stable 1 3 cm right adrenal nodule  Stable minimal thickening of the left adrenal gland with punctate calcifications    KIDNEYS/URETERS:    Unremarkable  No hydronephrosis    STOMACH AND BOWEL:    Post partial small bowel resection with unremarkable enteroenteric anastomosis in the right mid abdomen  No bowel obstruction  Single mid sigmoid diverticulum without diverticulitis    APPENDIX:    A normal appendix was visualized    ABDOMINOPELVIC CAVITY:  No ascites  No pneumoperitoneum  No lymphadenopathy  Stable 1 2 cm fat density nodule with soft tissue rim posterior to the lower uterine segment and left lateral to the rectum    VESSELS:    Aortoiliac calcification  No aneurysm    PELVIS   REPRODUCTIVE ORGANS:    Unremarkable for patient's age    URINARY BLADDER:    Unremarkable    ABDOMINAL WALL/INGUINAL REGIONS:    Minimal ventral midline supraumbilical postsurgical scar  Chronic right iliopsoas bursitis    OSSEOUS STRUCTURES:    No acute fracture or osseous destructive lesion identified  Degenerative changes of the spine, pubic symphysis, and multiple joints   Stable minimal grade 1 degenerative retrolisthesis of L5 on S1    IMPRESSION:   No evidence of recurrent or abdominopelvic metastatic disease  Cardiovascular and Mediastinum    Essential hypertension     9/22/2022 Nephrology  Blood pressure goal < 130/80 mmHg  Home log reviewed-all appropriate and within goal  As white coat hypertension  No changes made  Low sodium diet with increased dietary potassium  8/8/2022  Positive White Coat Syndrome  8/2/2022 - 148/80  8/3/2022 - 136/80  8/4/2022 - 147/78; 141/80  8/5/2022 - 145/76  8/6/2022 - 132/76  8/7/2022 - 133/76; 130/72  8/8/2022- 133/80  Continue lisinopril, hydralazine, metoprolol  10/11/2022 PCP  Continue hydralazine, lisinopril, metoprolol tartrate  List of blood pressure readings:  9/23: 137/76  9/24: 138/82  9/25: 136/80  9/27: 139/76  9/30: 131/80  10/3: 122/72  10/4: 129/73  10/5: 130/76  10/6: 136/74  10/7: 124/71  10/8: 130/77  10/9: 132/74  10/10: 120/74  10/11: 128/75                Musculoskeletal and Integument    Arthritis of right hip     3/5/2022 Xray bilateral Hips  FINDINGS:   There is no acute fracture or dislocation    Persistent moderate to severe degenerative arthritis right hip    No lytic or blastic osseous lesion    Soft tissues are unremarkable    Persistent multilevel degenerative lumbar spondylosis         Arthritis of right knee     3/5/2022 xray right knee  IMPRESSION:  Minimal tricompartmental degenerative arthritis         Iliopsoas bursitis of right hip     6/17/2022 CT Abdomen and Pelvis w/ Contrast  FINDINGS:  ABDOMEN   LOWER CHEST:    No clinically significant abnormality identified in the visualized lower chest    LIVER/BILIARY TREE:    Stable 3 mm right anterior hepatic subcentimeter hypodensity present as far back as November 2017 attributed to a cyst  Liver otherwise unremarkable  No duct dilation    GALLBLADDER:    No calcified gallstones  No pericholecystic inflammatory change    SPLEEN:    Stable 2 cm splenic hypodense lesion present as far back as November 2017 compatible with a benign finding   Spleen otherwise unremarkable    PANCREAS:    Unremarkable    ADRENAL GLANDS:    Stable 1 3 cm right adrenal nodule  Stable minimal thickening of the left adrenal gland with punctate calcifications    KIDNEYS/URETERS:    Unremarkable  No hydronephrosis    STOMACH AND BOWEL:    Post partial small bowel resection with unremarkable enteroenteric anastomosis in the right mid abdomen  No bowel obstruction  Single mid sigmoid diverticulum without diverticulitis    APPENDIX:    A normal appendix was visualized    ABDOMINOPELVIC CAVITY:  No ascites  No pneumoperitoneum  No lymphadenopathy  Stable 1 2 cm fat density nodule with soft tissue rim posterior to the lower uterine segment and left lateral to the rectum    VESSELS:    Aortoiliac calcification  No aneurysm    PELVIS   REPRODUCTIVE ORGANS:    Unremarkable for patient's age    URINARY BLADDER:    Unremarkable    ABDOMINAL WALL/INGUINAL REGIONS:    Minimal ventral midline supraumbilical postsurgical scar  Chronic right iliopsoas bursitis    OSSEOUS STRUCTURES:    No acute fracture or osseous destructive lesion identified  Degenerative changes of the spine, pubic symphysis, and multiple joints  Stable minimal grade 1 degenerative retrolisthesis of L5 on S1    IMPRESSION:   No evidence of recurrent or abdominopelvic metastatic disease  Retrolisthesis of vertebrae     6/17/2022 CT Abdomen and Pelvis w/ Contrast  FINDINGS:  ABDOMEN   LOWER CHEST:    No clinically significant abnormality identified in the visualized lower chest    LIVER/BILIARY TREE:    Stable 3 mm right anterior hepatic subcentimeter hypodensity present as far back as November 2017 attributed to a cyst  Liver otherwise unremarkable  No duct dilation    GALLBLADDER:    No calcified gallstones  No pericholecystic inflammatory change    SPLEEN:    Stable 2 cm splenic hypodense lesion present as far back as November 2017 compatible with a benign finding   Spleen otherwise unremarkable    PANCREAS: Unremarkable    ADRENAL GLANDS:    Stable 1 3 cm right adrenal nodule  Stable minimal thickening of the left adrenal gland with punctate calcifications    KIDNEYS/URETERS:    Unremarkable  No hydronephrosis    STOMACH AND BOWEL:    Post partial small bowel resection with unremarkable enteroenteric anastomosis in the right mid abdomen  No bowel obstruction  Single mid sigmoid diverticulum without diverticulitis    APPENDIX:    A normal appendix was visualized    ABDOMINOPELVIC CAVITY:  No ascites  No pneumoperitoneum  No lymphadenopathy  Stable 1 2 cm fat density nodule with soft tissue rim posterior to the lower uterine segment and left lateral to the rectum    VESSELS:    Aortoiliac calcification  No aneurysm    PELVIS   REPRODUCTIVE ORGANS:    Unremarkable for patient's age    URINARY BLADDER:    Unremarkable    ABDOMINAL WALL/INGUINAL REGIONS:    Minimal ventral midline supraumbilical postsurgical scar  Chronic right iliopsoas bursitis    OSSEOUS STRUCTURES:    No acute fracture or osseous destructive lesion identified  Degenerative changes of the spine, pubic symphysis, and multiple joints  Stable minimal grade 1 degenerative retrolisthesis of L5 on S1    IMPRESSION:   No evidence of recurrent or abdominopelvic metastatic disease  Genitourinary    Stage 3a chronic kidney disease ([facility] [address] )     Lab Results   Component Value Date    EGFR 48 09/15/2022    EGFR 54 06/09/2022    EGFR 55 02/25/2022    CREATININE 1 14 09/15/2022    CREATININE 1 04 06/09/2022    CREATININE 1 02 02/25/2022 9/22/2022 Nephrology  Baseline creatinine around 1 0-1 15 mg/dL per  records dating back to 2018  Normal kidney/bladder anatomy, bland urine  Etiology possibly due to hypertensive nephrosclerosis, age-related nephron loss, ?  Obesity related issues  Blood pressure log reviewed, blood pressures appropriate and within goal < 130/80 mmHg  On lisinopril  Continue to avoid potential nephrotoxins, NSAIDs  Advised weight loss  Return to office in 6 months        -     Comprehensive metabolic panel; Future; Expected date: 03/01/2023        -     CBC and differential; Future; Expected date: 03/01/2023        -     Microalbumin / creatinine urine ratio; Future; Expected date: 03/01/2023        -     Urinalysis with microscopic; Future; Expected date: 03/01/2023        -     Protein / creatinine ratio, urine; Future; Expected date: 03/01/2023            Other    Pure hypercholesterolemia     Continue crestor         Relevant Orders    Lipid panel    Encounter for administration of vaccine     10/11/2022  Flu vaccines administered          History of malignant neuroendocrine tumor     6/28/2022 Surgical Oncology  3/24/2020 Initial Diagnosis     Primary malignant neuroendocrine tumor of small intestine [facility])      4/29/2020 Surgery     Small bowel and mesenteric mass, resection:       - Well-differentiated neuroendocrine tumor, 2 4 cm, G2        - Lymph-vascular and perineural invasion are identified        - 4 of 13 lymph nodes positive for metastatic well-differentiated neuroendocrine tumor (4/13)       - Largest lymph node deposit: 1 5 cm with extra-emigdio extension, (mesenteric mass)       - 3 mitotic figures per 2 mm2, (consistent with grade G2)       - The tumor is extremely close to the serosal surface (approximately   0003 cm from serosal surface)        Radiation     The patient saw @[employer]@ for radiation treatment  This is the current list of radiation treatment:      Radiation Treatments      No radiation treatments to show  (Treatments may have been administered in another system )                Chemotherapy     [No treatment plan]          Staging: [id number] well-differentiated neuroendocrine tumor of the ileum, April 2020  Treatment history:  Small-bowel resection with mesenteric nodes, April 2020  Current treatment:  Observation  Disease status: KAREN    Stable adrenal nodule           Adrenal nodule ([facility] [address] )     6/17/2022 CT Abdomen and Pelvis w/ Contrast  FINDINGS:  ABDOMEN   LOWER CHEST:    No clinically significant abnormality identified in the visualized lower chest    LIVER/BILIARY TREE:    Stable 3 mm right anterior hepatic subcentimeter hypodensity present as far back as November 2017 attributed to a cyst  Liver otherwise unremarkable  No duct dilation    GALLBLADDER:    No calcified gallstones  No pericholecystic inflammatory change    SPLEEN:    Stable 2 cm splenic hypodense lesion present as far back as November 2017 compatible with a benign finding  Spleen otherwise unremarkable    PANCREAS:    Unremarkable    ADRENAL GLANDS:    Stable 1 3 cm right adrenal nodule  Stable minimal thickening of the left adrenal gland with punctate calcifications    KIDNEYS/URETERS:    Unremarkable  No hydronephrosis    STOMACH AND BOWEL:    Post partial small bowel resection with unremarkable enteroenteric anastomosis in the right mid abdomen  No bowel obstruction  Single mid sigmoid diverticulum without diverticulitis    APPENDIX:    A normal appendix was visualized    ABDOMINOPELVIC CAVITY:  No ascites  No pneumoperitoneum  No lymphadenopathy  Stable 1 2 cm fat density nodule with soft tissue rim posterior to the lower uterine segment and left lateral to the rectum    VESSELS:    Aortoiliac calcification  No aneurysm    PELVIS   REPRODUCTIVE ORGANS:    Unremarkable for patient's age    URINARY BLADDER:    Unremarkable    ABDOMINAL WALL/INGUINAL REGIONS:    Minimal ventral midline supraumbilical postsurgical scar  Chronic right iliopsoas bursitis    OSSEOUS STRUCTURES:    No acute fracture or osseous destructive lesion identified  Degenerative changes of the spine, pubic symphysis, and multiple joints  Stable minimal grade 1 degenerative retrolisthesis of L5 on S1    IMPRESSION:   No evidence of recurrent or abdominopelvic metastatic disease           Thickened endometrium    Medicare annual wellness visit, subsequent - Primary Lifestyle modification, diet and exercise discussed  Medications discussed and refilled appropriately  Labs discussed and ordered   Hepatitis C screening discussed  HIV screening discussed  Depression screening performed  Anxiety screening performed  Urinary Incontinence screening performed   BMI discussed  Mammogram ordered  Fall screening performed         Anxiety about health     Continue xanax  This patient was educated on side effects, risks of taking this type of medication which include abuse, misuse, dependence, addiction and tolerance  All questions were answered including different dosing levels, increasing and decreasing of of this medication  We discussed their continued open discussions with the PCP in order to make sure that they are on the correct dose  We reviewed the potential side effects of the medications including, but are not limited to, constipation, diarrhea, nausea/upset stomach, drowsiness, fatigue, dizziness, urinary retention, visual changes, insomnia, headaches, nightmares, slowed breathing while sleeping, UTI issues, impaired judgment, addiction issues and the risk of fatal overdose if not taken as prescribed  We discussed the importance of notifying the office/provider immediately of any side effects  We discussed the importance of immediate notification if there are any feelings of suicidality thoughts or behaviors   We discussed the importance of contacting the office if he has any tachycardia or fainting situations  The patient was warned against driving while taking sedation medications  We discussed that sharing medications is a felony  We discussed other side effects such as QT prolongation, risks of Myocardial Infarction (heart attack), stroke and sudden death  We discussed immediate notification if there is any seizure-like activity  We discussed issues with angioedema as an anaphylactic reactions        I have personally reviewed the [place] Drug Monitoring Program (PDMP) website prior to prescribing this medication  The patient understands and agrees to use these medications only as prescribed  At this point in time, the patient is showing no signs of addiction, abuse, diversion or suicidal ideation  All the patient's questions were answered to their satisfaction  [place] Prescription Drug Monitoring Program report was reviewed and was appropriate   All the patient's questions were answered to his/her satisfaction  Vitamin D deficiency     Continue supplementation  Check labs         Class 1 obesity due to excess calories without serious comorbidity with body mass index (BMI) of 31 0 to 31 9 in adult     Lifestyle modification, diet and exercise discussed          Abnormal CT of the abdomen     6/17/2022 CT Abdomen and Pelvis w/ Contrast  FINDINGS:  ABDOMEN   LOWER CHEST:    No clinically significant abnormality identified in the visualized lower chest    LIVER/BILIARY TREE:    Stable 3 mm right anterior hepatic subcentimeter hypodensity present as far back as November 2017 attributed to a cyst  Liver otherwise unremarkable  No duct dilation    GALLBLADDER:    No calcified gallstones  No pericholecystic inflammatory change    SPLEEN:    Stable 2 cm splenic hypodense lesion present as far back as November 2017 compatible with a benign finding  Spleen otherwise unremarkable    PANCREAS:    Unremarkable    ADRENAL GLANDS:    Stable 1 3 cm right adrenal nodule  Stable minimal thickening of the left adrenal gland with punctate calcifications    KIDNEYS/URETERS:    Unremarkable  No hydronephrosis    STOMACH AND BOWEL:    Post partial small bowel resection with unremarkable enteroenteric anastomosis in the right mid abdomen  No bowel obstruction  Single mid sigmoid diverticulum without diverticulitis    APPENDIX:    A normal appendix was visualized    ABDOMINOPELVIC CAVITY:  No ascites  No pneumoperitoneum  No lymphadenopathy   Stable 1 2 cm fat density nodule with soft tissue rim posterior to the lower uterine segment and left lateral to the rectum    VESSELS:    Aortoiliac calcification  No aneurysm    PELVIS   REPRODUCTIVE ORGANS:    Unremarkable for patient's age    URINARY BLADDER:    Unremarkable    ABDOMINAL WALL/INGUINAL REGIONS:    Minimal ventral midline supraumbilical postsurgical scar  Chronic right iliopsoas bursitis    OSSEOUS STRUCTURES:    No acute fracture or osseous destructive lesion identified  Degenerative changes of the spine, pubic symphysis, and multiple joints  Stable minimal grade 1 degenerative retrolisthesis of L5 on S1    IMPRESSION:   No evidence of recurrent or abdominopelvic metastatic disease  Splenic lesion     6/17/2022 CT Abdomen and Pelvis w/ Contrast  FINDINGS:  ABDOMEN   LOWER CHEST:    No clinically significant abnormality identified in the visualized lower chest    LIVER/BILIARY TREE:    Stable 3 mm right anterior hepatic subcentimeter hypodensity present as far back as November 2017 attributed to a cyst  Liver otherwise unremarkable  No duct dilation    GALLBLADDER:    No calcified gallstones  No pericholecystic inflammatory change    SPLEEN:    Stable 2 cm splenic hypodense lesion present as far back as November 2017 compatible with a benign finding  Spleen otherwise unremarkable    PANCREAS:    Unremarkable    ADRENAL GLANDS:    Stable 1 3 cm right adrenal nodule  Stable minimal thickening of the left adrenal gland with punctate calcifications    KIDNEYS/URETERS:    Unremarkable  No hydronephrosis    STOMACH AND BOWEL:    Post partial small bowel resection with unremarkable enteroenteric anastomosis in the right mid abdomen  No bowel obstruction  Single mid sigmoid diverticulum without diverticulitis    APPENDIX:    A normal appendix was visualized    ABDOMINOPELVIC CAVITY:  No ascites  No pneumoperitoneum  No lymphadenopathy   Stable 1 2 cm fat density nodule with soft tissue rim posterior to the lower uterine segment and left lateral to the rectum    VESSELS:    Aortoiliac calcification  No aneurysm    PELVIS   REPRODUCTIVE ORGANS:    Unremarkable for patient's age    URINARY BLADDER:    Unremarkable    ABDOMINAL WALL/INGUINAL REGIONS:    Minimal ventral midline supraumbilical postsurgical scar  Chronic right iliopsoas bursitis    OSSEOUS STRUCTURES:    No acute fracture or osseous destructive lesion identified  Degenerative changes of the spine, pubic symphysis, and multiple joints  Stable minimal grade 1 degenerative retrolisthesis of L5 on S1    IMPRESSION:   No evidence of recurrent or abdominopelvic metastatic disease  Screening for diabetes mellitus    Relevant Orders    HEMOGLOBIN A1C W/ EAG ESTIMATION    Encounter for vaccination    Relevant Orders    influenza vaccine, high-dose, PF 0 7 mL (FLUZONE HIGH-DOSE)        BMI Counseling: Body mass index is 32 39 kg/m²  The BMI is above normal  Nutrition recommendations include decreasing portion sizes, encouraging healthy choices of fruits and vegetables, decreasing fast food intake, consuming healthier snacks, limiting drinks that contain sugar, moderation in carbohydrate intake, increasing intake of lean protein, reducing intake of saturated and trans fat and reducing intake of cholesterol  Exercise recommendations include exercising 3-5 times per week  No pharmacotherapy was ordered  Rationale for BMI follow-up plan is due to patient being overweight or obese  Depression Screening and Follow-up Plan: Patient was screened for depression during today's encounter  They screened negative with a PHQ-2 score of 0  Preventive health issues were discussed with patient, and age appropriate screening tests were ordered as noted in patient's After Visit Summary  Personalized health advice and appropriate referrals for health education or preventive services given if needed, as noted in patient's After Visit Summary       History of Present Illness:     Patient presents for a Medicare Wellness Visit    The patient is here for her Medicare Annual Wellness Visit  Patient Care Team:  Patti Jones as PCP - General (Internal Medicine)  Darnell Kelly MD (Oncology)  Anita Elizabeth MD (General Surgery)  Tiffany Davenport MD (Nephrology)  Da Martinez as Nurse Practitioner (Nephrology)     Review of Systems:     Review of Systems   Constitutional: Negative for activity change, chills, fatigue and fever  HENT: Negative for rhinorrhea and sore throat  Eyes: Negative for pain  Respiratory: Negative for cough and shortness of breath  Cardiovascular: Negative for chest pain, palpitations and leg swelling  Gastrointestinal: Negative for abdominal pain, constipation, diarrhea, nausea and vomiting  Genitourinary: Negative for difficulty urinating, flank pain, frequency and urgency  Musculoskeletal: Negative for gait problem, joint swelling and myalgias  Skin: Negative for color change  Neurological: Negative for dizziness, weakness, light-headedness and headaches  Psychiatric/Behavioral: Negative for sleep disturbance  The patient is not nervous/anxious  All other systems reviewed and are negative         Problem List:     Patient Active Problem List   Diagnosis   • Rectal cancer ([facility] [address] )   • Colitis   • Pure hypercholesterolemia   • Encounter for administration of vaccine   • Essential hypertension   • Stage 3a chronic kidney disease ([facility] [address] )   • 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 ([facility] [address] )   • History of malignant neuroendocrine tumor   • Adrenal nodule ([facility] [address] )   • Encounter for follow-up examination after completed treatment for malignant neoplasm   • Thickened endometrium   • Medicare annual wellness visit, subsequent   • Anxiety about health   • Vitamin D deficiency   • Class 1 obesity due to excess calories without serious comorbidity with body mass index (BMI) of 31 0 to 31 9 in adult   • Personal history of COVID-19   • Follow-up exam, 3-6 months since previous exam   • Arthritis of right hip   • Arthritis of right knee   • Diverticulosis of colon with hemorrhage   • History of endometrial biopsy   • Abnormal pelvic ultrasound   • Abnormal CT of the abdomen   • Iliopsoas bursitis of right hip   • Splenic lesion   • Hepatic cyst   • Retrolisthesis of vertebrae   • Screening for diabetes mellitus   • Encounter for vaccination      Past Medical and Surgical History:     Past Medical History:   Diagnosis Date   • Colon polyps    • Essential hypertension    • Hematuria syndrome    • Osteoarthritis    • Proteinuria    • Vitamin D deficiency      Past Surgical History:   Procedure Laterality Date   • BREAST BIOPSY Right 2017    benign   • COLONOSCOPY  12/2018    3 polyps    • COLONOSCOPY      several   • MAMMO STEREOTACTIC BREAST BIOPSY LEFT (ALL INC) Left 6/18/2020   • MAMMO STEREOTACTIC BREAST BIOPSY RIGHT (ALL INC) Right 6/18/2020   • NM LAP,DIAGNOSTIC ABDOMEN N/A 3/24/2020    Procedure: 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;  Surgeon: Bertram Morataya MD;  Location: [address] [place] [address] MAIN OR;  Service: General   • RECTAL BIOPSY N/A 2/9/2018    Procedure: TRANSANAL EXCISION RECTAL POLYP;MUCOSAL POLYPECTOMY;  Surgeon: Chente Freire MD;  Location: [facility] MAIN OR;  Service: Colorectal   • SMALL INTESTINE SURGERY N/A 4/29/2020    Procedure: RESECTION SMALL BOWEL;  Surgeon: Ca Blunt MD;  Location: [facility] MAIN OR;  Service: Surgical Oncology      Family History:     Family History   Problem Relation Age of Onset   • Hypertension Mother    • Heart disease Father    • No Known Problems Daughter    • No Known Problems Maternal Grandmother    • No Known Problems Maternal Grandfather    • No Known Problems Paternal Grandmother    • No Known Problems Paternal Grandfather    • Pancreatic cancer Brother    • No Known Problems Maternal Aunt    • No Known Problems Paternal Aunt    • No Known Problems Paternal Aunt Social History:     Social History     Socioeconomic History   • Marital status: /Civil Union     Spouse name: None   • Number of children: None   • Years of education: None   • Highest education level: None   Occupational History   • None   Tobacco Use   • Smoking status: Former Smoker     Quit date:      Years since quittin 8   • Smokeless tobacco: Never Used   • Tobacco comment: 30  YEARS AGO    Vaping Use   • Vaping Use: Never used   Substance and Sexual Activity   • Alcohol use: Not Currently     Comment: occasional   • Drug use: Never   • Sexual activity: None   Other Topics Concern   • None   Social History Narrative   • None     Social Determinants of Health     Financial Resource Strain: Low Risk    • Difficulty of Paying Living Expenses: Not very hard   Food Insecurity: Not on file   Transportation Needs: No Transportation Needs   • Lack of Transportation (Medical): No   • Lack of Transportation (Non-Medical):  No   Physical Activity: Not on file   Stress: Not on file   Social Connections: Not on file   Intimate Partner Violence: Not on file   Housing Stability: Not on file      Medications and Allergies:     Current Outpatient Medications   Medication Sig Dispense Refill   • ALPRAZolam (XANAX) 0 25 mg tablet Take 1 tablet (0 25 mg total) by mouth daily at bedtime as needed for anxiety 60 tablet 0   • Cholecalciferol (Vitamin D3) 50 MCG (2000 UT) capsule Take 1 capsule (2,000 Units total) by mouth every morning     • co-enzyme Q-10 50 MG capsule Take 1 capsule (50 mg total) by mouth every other day     • fluticasone (FLONASE) 50 mcg/act nasal spray 1 spray into each nostril daily     • hydrALAZINE (APRESOLINE) 50 mg tablet Take 1 tablet (50 mg total) by mouth 3 (three) times a day 270 tablet 3   • lisinopril (ZESTRIL) 20 mg tablet Take 1 tablet (20 mg total) by mouth 2 (two) times a day 180 tablet 3   • metoprolol tartrate (LOPRESSOR) 100 mg tablet TAKE ONE TABLET BY MOUTH TWICE A DAY 180 tablet 3   • rosuvastatin (CRESTOR) 20 MG tablet Take 1 tablet (20 mg total) by mouth every other day 90 tablet 3     Current Facility-Administered Medications   Medication Dose Route Frequency Provider Last Rate Last Admin   • cephalexin (KEFLEX) capsule 500 mg  500 mg Oral Q6H Ryan Mcclellan MD         No Known Allergies   Immunizations:     Immunization History   Administered Date(s) Administered   • COVID-19 J&J (Carmelo) vaccine 0 5 mL 04/06/2021   • INFLUENZA 09/25/2015, 10/26/2017, 10/29/2018, 09/23/2021   • Influenza, high dose seasonal 0 7 mL 10/21/2019   • Pneumococcal Conjugate 13-Valent 05/10/2018      Health Maintenance:         Topic Date Due   • Hepatitis C Screening  Never done   • Breast Cancer Screening: Mammogram  05/11/2023         Topic Date Due   • Pneumococcal Vaccine: 65+ Years (2 - PPSV23 or PCV20) 05/10/2019   • COVID-19 Vaccine (2 - Carmelo risk series) 05/04/2021   • Influenza Vaccine (1) 09/01/2022      Medicare Screening Tests and Risk Assessments: Sunnie Hammans is here for her Subsequent Wellness visit  Health Risk Assessment:   Patient rates overall health as good  Patient feels that their physical health rating is same  Patient is very satisfied with their life  Eyesight was rated as same  Hearing was rated as same  Patient feels that their emotional and mental health rating is same  Patients states they are never, rarely angry  Patient states they are sometimes unusually tired/fatigued  Pain experienced in the last 7 days has been some  Patient's pain rating has been 4/10  Patient states that she has experienced no weight loss or gain in last 6 months  Depression Screening:   PHQ-2 Score: 0      Fall Risk Screening: In the past year, patient has experienced: no history of falling in past year      Urinary Incontinence Screening:   Patient has not leaked urine accidently in the last six months       Home Safety:  Patient does not have trouble with stairs inside or outside of their home  Patient has working smoke alarms and has working carbon monoxide detector  Nutrition:   Current diet is Regular and Limited junk food  Medications:   Patient is not currently taking any over-the-counter supplements  Patient is able to manage medications  Activities of Daily Living (ADLs)/Instrumental Activities of Daily Living (IADLs):   Walk and transfer into and out of bed and chair?: Yes  Dress and groom yourself?: Yes    Bathe or shower yourself?: Yes    Feed yourself? Yes  Do your laundry/housekeeping?: Yes  Manage your money, pay your bills and track your expenses?: Yes  Make your own meals?: Yes    Do your own shopping?: Yes    Previous Hospitalizations:   Any hospitalizations or ED visits within the last 12 months?: No      Advance Care Planning:   Living will: No    Durable POA for healthcare: No    Advanced directive: No      Cognitive Screening:   Provider or family/friend/caregiver concerned regarding cognition?: No    PREVENTIVE SCREENINGS      Cardiovascular Screening:    General: Screening Not Indicated and History Lipid Disorder      Diabetes Screening:     General: Screening Current      Colorectal Cancer Screening:     General: History Colorectal Cancer      Breast Cancer Screening:     General: Screening Current      Cervical Cancer Screening:    General: Screening Not Indicated      Lung Cancer Screening:     General: Screening Not Indicated    Screening, Brief Intervention, and Referral to Treatment (SBIRT)    Screening    Typical number of drinks in a week: 0    Single Item Drug Screening:  How often have you used an illegal drug (including marijuana) or a prescription medication for non-medical reasons in the past year? never    Single Item Drug Screen Score: 0  Interpretation: Negative screen for possible drug use disorder    Other Counseling Topics:   Car/seat belt/driving safety and calcium and vitamin D intake and regular weightbearing exercise       No exam data present     Physical Exam:     /88 (BP Location: Left arm, Patient Position: Sitting, Cuff Size: Standard)   Pulse 68   Temp 97 7 °F (36 5 °C)   Wt 96 6 kg (213 lb)   SpO2 97%   BMI 32 39 kg/m²     Physical Exam  Vitals and nursing note reviewed  Constitutional:       General: She is awake  She is not in acute distress  Appearance: Normal appearance  She is well-developed and overweight  HENT:      Head: Normocephalic and atraumatic  Nose: Nose normal       Mouth/Throat:      Mouth: Mucous membranes are moist    Eyes:      Conjunctiva/sclera: Conjunctivae normal    Cardiovascular:      Rate and Rhythm: Normal rate and regular rhythm  Pulses: Normal pulses  Heart sounds: Normal heart sounds  No murmur heard  Pulmonary:      Effort: Pulmonary effort is normal  No respiratory distress  Breath sounds: Normal breath sounds  Abdominal:      General: Bowel sounds are normal       Palpations: Abdomen is soft  Tenderness: There is no abdominal tenderness  Musculoskeletal:      Cervical back: Neck supple  Right lower leg: No edema  Left lower leg: No edema  Skin:     General: Skin is warm and dry  Neurological:      Mental Status: She is alert and oriented to person, place, and time  Psychiatric:         Attention and Perception: Attention normal          Mood and Affect: Mood normal          Speech: Speech normal          Behavior: Behavior normal  Behavior is cooperative            [address] [place]

## 2022-10-11 NOTE — ASSESSMENT & PLAN NOTE
• Lifestyle modification, diet and exercise discussed  • Medications discussed and refilled appropriately  • Labs discussed and ordered   • Hepatitis C screening discussed  • HIV screening discussed  • Depression screening performed  • Anxiety screening performed  • Urinary Incontinence screening performed   • BMI discussed  • Mammogram ordered  • Fall screening performed

## 2022-11-10 DIAGNOSIS — I10 ESSENTIAL HYPERTENSION: ICD-10-CM

## 2022-11-10 RX ORDER — HYDRALAZINE HYDROCHLORIDE 50 MG/1
50 TABLET, FILM COATED ORAL 3 TIMES DAILY
Qty: 270 TABLET | Refills: 3 | Status: SHIPPED | OUTPATIENT
Start: 2022-11-10

## 2022-12-10 PROBLEM — Z00.00 MEDICARE ANNUAL WELLNESS VISIT, SUBSEQUENT: Status: RESOLVED | Noted: 2021-08-10 | Resolved: 2022-12-10

## 2022-12-10 PROBLEM — Z13.1 SCREENING FOR DIABETES MELLITUS: Status: RESOLVED | Noted: 2022-10-11 | Resolved: 2022-12-10

## 2022-12-13 ENCOUNTER — APPOINTMENT (OUTPATIENT)
Dept: LAB | Facility: CLINIC | Age: 72
End: 2022-12-13

## 2022-12-13 DIAGNOSIS — E78.00 PURE HYPERCHOLESTEROLEMIA: ICD-10-CM

## 2022-12-13 DIAGNOSIS — Z13.1 SCREENING FOR DIABETES MELLITUS: ICD-10-CM

## 2022-12-13 DIAGNOSIS — Z85.9 HISTORY OF MALIGNANT NEUROENDOCRINE TUMOR: ICD-10-CM

## 2022-12-13 LAB
BUN SERPL-MCNC: 14 MG/DL (ref 5–25)
CREAT SERPL-MCNC: 1.1 MG/DL (ref 0.6–1.3)
GFR SERPL CREATININE-BSD FRML MDRD: 50 ML/MIN/1.73SQ M

## 2022-12-28 ENCOUNTER — HOSPITAL ENCOUNTER (OUTPATIENT)
Dept: CT IMAGING | Facility: HOSPITAL | Age: 72
Discharge: HOME | End: 2022-12-28
Attending: SURGERY

## 2022-12-28 DIAGNOSIS — Z85.9 HISTORY OF MALIGNANT NEUROENDOCRINE TUMOR: ICD-10-CM

## 2022-12-28 RX ADMIN — IOHEXOL 100 ML: 350 INJECTION, SOLUTION INTRAVENOUS at 08:36

## 2023-01-05 ENCOUNTER — TELEPHONE (OUTPATIENT)
Dept: SURGICAL ONCOLOGY | Facility: CLINIC | Age: 73
End: 2023-01-05

## 2023-01-05 DIAGNOSIS — Z85.9 HISTORY OF MALIGNANT NEUROENDOCRINE TUMOR: Primary | ICD-10-CM

## 2023-01-06 ENCOUNTER — APPOINTMENT (OUTPATIENT)
Dept: LAB | Facility: CLINIC | Age: 73
End: 2023-01-06

## 2023-01-06 DIAGNOSIS — Z85.9 HISTORY OF MALIGNANT NEUROENDOCRINE TUMOR: ICD-10-CM

## 2023-01-06 LAB
CHOLEST SERPL-MCNC: 134 MG/DL
EST. AVERAGE GLUCOSE BLD GHB EST-MCNC: 105 MG/DL
HBA1C MFR BLD: 5.3 %
HDLC SERPL-MCNC: 49 MG/DL
LDLC SERPL CALC-MCNC: 61 MG/DL (ref 0–100)
NONHDLC SERPL-MCNC: 85 MG/DL
TRIGL SERPL-MCNC: 119 MG/DL

## 2023-01-10 ENCOUNTER — TELEPHONE (OUTPATIENT)
Dept: SURGICAL ONCOLOGY | Facility: CLINIC | Age: 73
End: 2023-01-10

## 2023-01-10 ENCOUNTER — OFFICE VISIT (OUTPATIENT)
Dept: SURGICAL ONCOLOGY | Facility: CLINIC | Age: 73
End: 2023-01-10

## 2023-01-10 VITALS
OXYGEN SATURATION: 97 % | HEART RATE: 65 BPM | HEIGHT: 68 IN | DIASTOLIC BLOOD PRESSURE: 88 MMHG | BODY MASS INDEX: 32.43 KG/M2 | TEMPERATURE: 97.1 F | SYSTOLIC BLOOD PRESSURE: 142 MMHG | WEIGHT: 214 LBS | RESPIRATION RATE: 18 BRPM

## 2023-01-10 DIAGNOSIS — Z08 ENCOUNTER FOR FOLLOW-UP EXAMINATION AFTER COMPLETED TREATMENT FOR MALIGNANT NEOPLASM: Primary | ICD-10-CM

## 2023-01-10 DIAGNOSIS — Z85.9 HISTORY OF MALIGNANT NEUROENDOCRINE TUMOR: ICD-10-CM

## 2023-01-10 LAB — CGA SERPL-MCNC: 79.7 NG/ML (ref 0–101.8)

## 2023-01-10 NOTE — TELEPHONE ENCOUNTER
Called and spoke with Isatu Khanna PA-C at [address] [place] [address], who saw the patient yesterday in the office  Explained that there was thickening seen on CT at the anastomosis, and we would like visualization of this area  The patient is scheduled for colonoscopy next week, but capsule endoscopy may be required  Hortencia Campos stated that she will discuss this with Dr Lee Gu  He may be able to try using a pediatric scope  Otherwise they can set up capsule endoscopy in the office  They will contact patient to discuss further

## 2023-01-10 NOTE — PROGRESS NOTES
Surgical Oncology Follow Up       [facility] [address]  [facility] [address] [address]  [phone]    [facility] [facility]  1950  [phone]  [facility]  [address] [address]  [phone]    Diagnoses and all orders for this visit:    Encounter for follow-up examination after completed treatment for malignant neoplasm    History of malignant neuroendocrine tumor  -     CT abdomen pelvis w contrast; Future  -     BUN; Future  -     Creatinine, serum; Future  -     Chromogranin A; Future        Chief Complaint   Patient presents with   • Follow-up       Return in about 3 months (around 4/10/2023) for Imaging - See orders, Labs - See Treatment Plan, Office Visit  Oncology History   History of malignant neuroendocrine tumor   3/24/2020 Initial Diagnosis    Primary malignant neuroendocrine tumor of small intestine ([facility] [address] )     4/29/2020 Surgery    Small bowel and mesenteric mass, resection:       - Well-differentiated neuroendocrine tumor, 2 4 cm, G2        - Lymph-vascular and perineural invasion are identified  - 4 of 13 lymph nodes positive for metastatic well-differentiated neuroendocrine tumor (4/13)  - Largest lymph node deposit: 1 5 cm with extra-emigdio extension, (mesenteric mass)  - 3 mitotic figures per 2 mm2, (consistent with grade G2)  - The tumor is extremely close to the serosal surface (approximately   0003 cm from serosal surface)  Radiation    The patient saw @[email or web address]@ for radiation treatment  This is the current list of radiation treatment:  Radiation Treatments     No radiation treatments to show   (Treatments may have been administered in another system )            Chemotherapy    [No treatment plan]         Staging: [id number] well-differentiated neuroendocrine tumor of the ileum, April 2020  Treatment history:  Small-bowel resection with mesenteric nodes, April 2020  Current treatment:  Observation  Disease status: KAREN    Stable benign adrenal nodule  Stable/decreasing right psoas mass  History of Present Illness: The patient returns today in follow-up for her history of a 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  She is almost 3 years s/p resection, and doing well  She denies any abdominal pain, nausea, vomiting, weight loss or change in bowel habits  She has not noticed any sweating, flushing, palpitations or headaches  She is scheduled for repeat colonoscopy next week as it has been 3 years since her last one  A chromogranin A level was drawn but is still pending at this time  CT of the abdomen and pelvis was performed on December 28, 2022  I have reviewed these results with Dr Josemanuel Chauhan and discussed them with the patient today  Review of Systems   Constitutional: Negative for activity change, appetite change, fatigue and unexpected weight change  HENT: Negative  Respiratory: Negative  Negative for cough and shortness of breath  Cardiovascular: Negative  Negative for palpitations  Gastrointestinal: Negative  Negative for abdominal distention, abdominal pain, constipation, diarrhea, nausea and vomiting  Musculoskeletal: Negative  Skin: Negative  Negative for color change  Neurological: Negative  Negative for dizziness and headaches  Hematological: Negative  Negative for adenopathy  Psychiatric/Behavioral: Negative                Patient Active Problem List   Diagnosis   • Rectal cancer ([address] )   • Colitis   • Pure hypercholesterolemia   • Encounter for administration of vaccine   • Essential hypertension   • Stage 3a chronic kidney disease ([address] )   • 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 ([address] )   • History of malignant neuroendocrine tumor   • Adrenal nodule ([address] )   • Encounter for follow-up examination after completed treatment for malignant neoplasm   • Thickened endometrium   • Anxiety about health   • Vitamin D deficiency   • Class 1 obesity due to excess calories without serious comorbidity with body mass index (BMI) of 31 0 to 31 9 in adult   • Personal history of COVID-19   • Follow-up exam, 3-6 months since previous exam   • Arthritis of right hip   • Arthritis of right knee   • Diverticulosis of colon with hemorrhage   • History of endometrial biopsy   • Abnormal pelvic ultrasound   • Abnormal CT of the abdomen   • Iliopsoas bursitis of right hip   • Splenic lesion   • Hepatic cyst   • Retrolisthesis of vertebrae   • Encounter for vaccination     Past Medical History:   Diagnosis Date   • Colon polyps    • Essential hypertension    • Hematuria syndrome    • Osteoarthritis    • Proteinuria    • Vitamin D deficiency      Past Surgical History:   Procedure Laterality Date   • BREAST BIOPSY Right 2017    benign   • COLONOSCOPY  12/2018    3 polyps    • COLONOSCOPY      several   • MAMMO STEREOTACTIC BREAST BIOPSY LEFT (ALL INC) Left 6/18/2020   • MAMMO STEREOTACTIC BREAST BIOPSY RIGHT (ALL INC) Right 6/18/2020   • MA LAPS ABD PRTM&OMENTUM DX W/WO SPEC BR/WA SPX N/A 3/24/2020    Procedure: 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;  Surgeon: Vaishali Clark MD;  Location: [address] [place] [address] MAIN OR;  Service: General   • RECTAL BIOPSY N/A 2/9/2018    Procedure: TRANSANAL EXCISION RECTAL POLYP;MUCOSAL POLYPECTOMY;  Surgeon: Andrews Santos MD;  Location: [facility] MAIN OR;  Service: Colorectal   • SMALL INTESTINE SURGERY N/A 4/29/2020    Procedure: RESECTION SMALL BOWEL;  Surgeon: Dempsey Fabry, MD;  Location: [facility] MAIN OR;  Service: Surgical Oncology     Family History   Problem Relation Age of Onset   • Hypertension Mother    • Heart disease Father    • No Known Problems Daughter    • No Known Problems Maternal Grandmother    • No Known Problems Maternal Grandfather    • No Known Problems Paternal Grandmother    • No Known Problems Paternal Grandfather    • Pancreatic cancer Brother    • No Known Problems Maternal Aunt    • No Known Problems Paternal Aunt    • No Known Problems Paternal Aunt      Social History     Socioeconomic History   • Marital status: /Civil Union     Spouse name: Not on file   • Number of children: Not on file   • Years of education: Not on file   • Highest education level: Not on file   Occupational History   • Not on file   Tobacco Use   • Smoking status: Former     Types: Cigarettes     Quit date: 5     Years since quittin 0   • Smokeless tobacco: Never   • Tobacco comments:     30  YEARS AGO    Vaping Use   • Vaping Use: Never used   Substance and Sexual Activity   • Alcohol use: Not Currently     Comment: occasional   • Drug use: Never   • Sexual activity: Not on file   Other Topics Concern   • Not on file   Social History Narrative   • Not on file     Social Determinants of Health     Financial Resource Strain: Low Risk    • Difficulty of Paying Living Expenses: Not very hard   Food Insecurity: Not on file   Transportation Needs: No Transportation Needs   • Lack of Transportation (Medical): No   • Lack of Transportation (Non-Medical):  No   Physical Activity: Not on file   Stress: Not on file   Social Connections: Not on file   Intimate Partner Violence: Not on file   Housing Stability: Not on file       Current Outpatient Medications:   •  ALPRAZolam (XANAX) 0 25 mg tablet, Take 1 tablet (0 25 mg total) by mouth daily at bedtime as needed for anxiety, Disp: 60 tablet, Rfl: 0  •  Cholecalciferol (Vitamin D3) 50 MCG (2000 UT) capsule, Take 1 capsule (2,000 Units total) by mouth every morning, Disp: , Rfl:   •  co-enzyme Q-10 50 MG capsule, Take 1 capsule (50 mg total) by mouth every other day, Disp: , Rfl:   •  fluticasone (FLONASE) 50 mcg/act nasal spray, 1 spray into each nostril daily, Disp: , Rfl:   •  hydrALAZINE (APRESOLINE) 50 mg tablet, Take 1 tablet (50 mg total) by mouth 3 (three) times a day, Disp: 270 tablet, Rfl: 3  •  lisinopril (ZESTRIL) 20 mg tablet, Take 1 tablet (20 mg total) by mouth 2 (two) times a day, Disp: 180 tablet, Rfl: 3  •  metoprolol tartrate (LOPRESSOR) 100 mg tablet, TAKE ONE TABLET BY MOUTH TWICE A DAY, Disp: 180 tablet, Rfl: 3  •  rosuvastatin (CRESTOR) 20 MG tablet, Take 1 tablet (20 mg total) by mouth every other day, Disp: 90 tablet, Rfl: 3    Current Facility-Administered Medications:   •  cephalexin (KEFLEX) capsule 500 mg, 500 mg, Oral, Q6H [address], Breanna Spencer MD  No Known Allergies  Vitals:    01/10/23 0822   BP: 142/88   Pulse: 65   Resp: 18   Temp: (!) 97 1 °F (36 2 °C)   SpO2: 97%       Physical Exam  Vitals reviewed  Constitutional:       General: She is not in acute distress  Appearance: Normal appearance  She is not ill-appearing or toxic-appearing  HENT:      Head: Normocephalic and atraumatic  Nose: Nose normal       Mouth/Throat:      Mouth: Mucous membranes are moist    Eyes:      General: No scleral icterus  Extraocular Movements: Extraocular movements intact  Conjunctiva/sclera: Conjunctivae normal       Pupils: Pupils are equal, round, and reactive to light  Cardiovascular:      Rate and Rhythm: Normal rate and regular rhythm  Pulmonary:      Effort: Pulmonary effort is normal       Breath sounds: Normal breath sounds  Abdominal:      General: Abdomen is flat  There is no distension  Palpations: Abdomen is soft  There is no mass  Tenderness: There is no abdominal tenderness  There is no guarding  Hernia: No hernia is present  Musculoskeletal:         General: Normal range of motion  Cervical back: Normal range of motion and neck supple  Skin:     General: Skin is warm and dry  Coloration: Skin is not jaundiced  Neurological:      General: No focal deficit present  Mental Status: She is alert and oriented to person, place, and time     Psychiatric:         Mood and Affect: Mood normal  Behavior: Behavior normal          Thought Content: Thought content normal          Judgment: Judgment normal            Imaging  CT abdomen pelvis w contrast    Addendum Date: 1/3/2023 Addendum:   ADDENDUM: Upon review of the coronal images, there is focal small bowel wall thickening at the site of anastomosis (series 601 image 71), not seen on prior images dating back to 11/14/2020  Differential diagnoses include under distention of the small bowel, postoperative scarring or recurrent disease  Recommend follow-up CT abdomen pelvis in 3 months with attention to this region  No abnormally enlarged mesenteric lymph nodes are seen  I personally discussed this study with Christian Campos on 1/3/2023 at 1:56 PM      Result Date: 1/3/2023  Narrative: CT ABDOMEN AND PELVIS WITH IV CONTRAST INDICATION:   Z85 9: Personal history of malignant neoplasm, unspecified  History of malignant carcinoid COMPARISON:  6/17/2022, 11/14/2020, 11/8/2017 CT abdomen pelvis TECHNIQUE:  CT examination of the abdomen and pelvis was performed  Axial, sagittal, and coronal 2D reformatted images were created from the source data and submitted for interpretation  Radiation dose length product (DLP) for this visit:  910 12 mGy-cm   This examination, like all CT scans performed in the [facility], was performed utilizing techniques to minimize radiation dose exposure, including the use of iterative  reconstruction and automated exposure control  IV Contrast:  100 mL of iohexol (OMNIPAQUE) Enteric Contrast:  Enteric contrast was not administered  FINDINGS: ABDOMEN LOWER CHEST:  No clinically significant abnormality identified in the visualized lower chest  LIVER/BILIARY TREE:  Focal fatty infiltration at the insertion site of falciform ligament  Subcentimeter low-attenuation lesion at the right hepatic dome, stable in size since 11/14/2020 and representing benign etiology such as cyst or hemangioma   GALLBLADDER:  No calcified gallstones  No pericholecystic inflammatory change  SPLEEN:  Again seen is a 1 7 cm hypoattenuating splenic lesion, stable in size since 11/8/2017 likely representing a splenic hemangioma  PANCREAS:  Unremarkable  ADRENAL GLANDS:  Again seen is a 1 3 cm right adrenal nodule, previously measuring 1 2 cm on 11/14/2020, and 11/8/2017, representing benign etiology such as adenoma  Stable calcifications along the left adrenal gland, likely secondary to prior trauma or  inflammation  KIDNEYS/URETERS:  Unremarkable  No hydronephrosis  STOMACH AND BOWEL:  Prior small bowel resection with anastomosis in the right mid abdomen  APPENDIX:  No findings to suggest appendicitis  ABDOMINOPELVIC CAVITY:  No ascites  No pneumoperitoneum  No lymphadenopathy  Interval decrease in the size of the soft tissue nodule visualized posterior to the lower uterine segment of left of the rectum, currently measuring 0 7 x 0 7 cm (previously 1 0 x 1 1 cm, representing benign etiology  VESSELS:  Atherosclerotic changes are present  No evidence of aneurysm  PELVIS REPRODUCTIVE ORGANS:  Unremarkable for patient's age  URINARY BLADDER:  Unremarkable  ABDOMINAL WALL/INGUINAL REGIONS:  Again seen is a 1 1 x 2 4 x 6 4 cm cystic lesion in the right psoas muscle (series 2 image 71, series 601 image 62), decreased in size since 11/14/2020 and representing benign etiology  Postsurgical changes in the anterior abdominal wall  OSSEOUS STRUCTURES:  No acute fracture or destructive osseous lesion  Impression: *  No imaging findings of local recurrence or abdominopelvic metastasis  *  Additional findings as in the body of the report  Workstation performed: [id number]     I reviewed the above imaging data  Discussion/Summary: This is a very pleasant 72 y/o female who presents today for continued surveillance for her history of a locally metastatic intra-abdominal carcinoid tumor    Imaging suggests possible thickening at the anastamosis, which will need to be explored further  Since it may not be possible to visualize this area with standard endoscopy, I will reach out to her gastroenterologist to discuss possible capsule endoscopy  Her chromogranin A level has not resulted yet, and I will call her with that result when available  We will plan to repeat labs and imaging in 3 months, unless further work-up necessitates sooner intervention  She is agreeable to the plan, all questions have been answered

## 2023-01-30 DIAGNOSIS — Z85.9 HISTORY OF MALIGNANT NEUROENDOCRINE TUMOR: Primary | ICD-10-CM

## 2023-02-01 ENCOUNTER — CONSULT (OUTPATIENT)
Dept: GASTROENTEROLOGY | Facility: CLINIC | Age: 73
End: 2023-02-01

## 2023-02-01 VITALS
BODY MASS INDEX: 31.98 KG/M2 | HEIGHT: 68 IN | RESPIRATION RATE: 18 BRPM | SYSTOLIC BLOOD PRESSURE: 158 MMHG | OXYGEN SATURATION: 100 % | DIASTOLIC BLOOD PRESSURE: 94 MMHG | WEIGHT: 211 LBS | HEART RATE: 74 BPM

## 2023-02-01 DIAGNOSIS — C20 RECTAL CANCER (HCC): ICD-10-CM

## 2023-02-01 DIAGNOSIS — Z85.9 HISTORY OF MALIGNANT NEUROENDOCRINE TUMOR: ICD-10-CM

## 2023-02-01 DIAGNOSIS — R93.5 ABNORMAL CT OF THE ABDOMEN: Primary | ICD-10-CM

## 2023-02-01 DIAGNOSIS — R10.13 DYSPEPSIA: ICD-10-CM

## 2023-02-01 NOTE — PROGRESS NOTES
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), s/p small bowel resection with mesenteric nodes in 04/2020  No adjuvant treatment needed, patient has been monitored every 6 months  Recent contrasted CT scan commented on possible bowel wall thickening at site of anastomosis  Chromogranin A from 01/2023 normal range at 79 7  Discussed case with surgical oncology Dr Ariel Bustos and advanced endoscopy Dr Saran Bustos shares anastomosis is approximately 1 foot from cecum, and has low index of suspicion for recurrence of dz  For definitive evaluation, will proceed with colonoscopy with single balloon retrograde enteroscopy for evaluation of small bowel wall thickening  Golytely prep given CKD  Risks associated with endoscopic evaluation discussed with patient today  These include and are not limited to bleeding, infection, perforation, all of which are low  Patient demonstrates understanding and is agreeable to the plan  Procedure to be scheduled in [place]  - Colonoscopy with Single Balloon Enteroscopy Retrograde; Future  - polyethylene glycol (GOLYTELY) 4000 mL solution; Take 4,000 mL by mouth once for 1 dose  Dispense: 4000 mL; Refill: 0    3  Rectal cancer ([address] )    On problem list, though patient's details surrounding this history are not well known, thinks it may have been an advanced adenoma? Nevertheless, local GI group has been performing surveillance colonoscopies on patient, most recent in 01/2023 with a 3-year follow-up recommended  No alarm features at this time  Continue with high-fiber diet and constipation prevention  4  Dyspepsia    Rare with certain trigger foods/oral intake     No alarm features  No indication for endoscopic evaluation at this time  Recommend continuing with diet and lifestyle modifications for GERD  Okay for PRN antacid if needed for symptoms  Call office if symptoms worsen  We will follow up with pt after endoscopic evaluation is complete    ______________________________________________________________________    SUBJECTIVE: Patient is a 71 y o  female who presents today for follow-up regarding neuroendocrine tumor  Pmhx sig for neuroendocrine tumor s/p resection, history of rectal cancer (?), HTN, diverticulosis, arthritis, CKD 3, HLD  Pt is new to clinic  Follows with  Surgical Oncology and [facility]  Patient has a history of 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  She underwent a surgical resection in 04/2020  She did not undergo any adjuvant radiation or chemotherapy, and is not requiring any medical management  She continues with surveillance with surgical oncology and has twice yearly imaging studies, most recent in 12/2022 commented on possible small bowel wall thickening at the site of the anastomosis  She recently underwent endoscopic evaluation with a colonoscopy for surveillance purposes, and her local GI practitioner have referred her for an advanced endoscopic evaluation  Patient is feeling well overall  She notes rare heartburn with dietary indiscretion, no nausea, emesis, dysphagia, odynophagia  No abdominal pain  No BRBPR or melena  No flushing, headaches, palpitations or headaches  She notes some intermittent sweating and warmth in the evenings  No unintentional weight loss  NSAID use: none   Etoh: socially   Tobacco: none     Patient has CKD, denies pacemaker/defibrillator, anticoagulation, O2 use, history of CAD  She notes issues with blood pressure only at office visits    Blood pressures at home are in the 130s/70s     12/2022: BUN 14, creatinine 1 10  01/2023: Chromogranin A 79 7    Endoscopic history:   EGD: none  Colon: 2023: Hemorrhoids and diverticulosis, repeat 3 years     Review of Systems   Constitutional: Negative for appetite change, chills, fatigue, fever and unexpected weight change  Respiratory: Negative for shortness of breath  Cardiovascular: Negative for chest pain and palpitations  Gastrointestinal: Negative for abdominal pain, blood in stool, constipation, diarrhea, nausea and vomiting  Neurological: Negative for headaches     Otherwise per HPI    Historical Information   Past Medical History:   Diagnosis Date   • Colon polyps    • Essential hypertension    • Hematuria syndrome    • Osteoarthritis    • Proteinuria    • Vitamin D deficiency      Past Surgical History:   Procedure Laterality Date   • BREAST BIOPSY Right 2017    benign   • COLONOSCOPY  2018    3 polyps    • COLONOSCOPY      several   • MAMMO STEREOTACTIC BREAST BIOPSY LEFT (ALL INC) Left 2020   • MAMMO STEREOTACTIC BREAST BIOPSY RIGHT (ALL INC) Right 2020   • [place] LAPS ABD PRTM&OMENTUM DX W/WO SPEC BR/WA SPX N/A 3/24/2020    Procedure: 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;  Surgeon: Letty Potts MD;  Location: [address] [place] [address] MAIN OR;  Service: General   • RECTAL BIOPSY N/A 2018    Procedure: TRANSANAL EXCISION RECTAL POLYP;MUCOSAL POLYPECTOMY;  Surgeon: Cindi Ortiz MD;  [facility]: [facility] MAIN [facility];  Service: Colorectal   • SMALL INTESTINE SURGERY N/A 2020    Procedure: RESECTION SMALL BOWEL;  Surgeon: Josh Peterson MD;  Location: [facility] MAIN [facility];  Service: Surgical Oncology     Social History   Social History     Substance and Sexual Activity   Alcohol Use Not Currently    Comment: occasional     Social History     Substance and Sexual Activity   Drug Use Never     Social History     Tobacco Use   Smoking Status Former   • Types: Cigarettes   • Quit date:    • Years since quittin 1   Smokeless Tobacco Never   Tobacco Comments    27  YEARS AGO      Family History Problem Relation Age of Onset   • Hypertension Mother    • Heart disease Father    • No Known Problems Daughter    • No Known Problems Maternal Grandmother    • No Known Problems Maternal Grandfather    • No Known Problems Paternal Grandmother    • No Known Problems Paternal Grandfather    • Pancreatic cancer Brother    • No Known Problems Maternal Aunt    • No Known Problems Paternal Aunt    • No Known Problems Paternal Aunt      Meds/Allergies       Current Outpatient Medications:   •  ALPRAZolam (XANAX) 0 25 mg tablet  •  Cholecalciferol (Vitamin D3) 50 MCG (2000 UT) capsule  •  co-enzyme Q-10 50 MG capsule  •  fluticasone (FLONASE) 50 mcg/act nasal spray  •  hydrALAZINE (APRESOLINE) 50 mg tablet  •  lisinopril (ZESTRIL) 20 mg tablet  •  metoprolol tartrate (LOPRESSOR) 100 mg tablet  •  rosuvastatin (CRESTOR) 20 MG tablet    Current Facility-Administered Medications:   •  cephalexin (KEFLEX) capsule 500 mg, 500 mg, Oral, Q6H [address]    No Known Allergies    Objective     There were no vitals taken for this visit  There is no height or weight on file to calculate BMI  Physical Exam  Vitals and nursing note reviewed  Constitutional:       General: She is not in acute distress  Appearance: Normal appearance  HENT:      Head: Normocephalic and atraumatic  Eyes:      General: No scleral icterus  Cardiovascular:      Rate and Rhythm: Normal rate  Heart sounds: Normal heart sounds  Pulmonary:      Effort: Pulmonary effort is normal       Breath sounds: Normal breath sounds  Abdominal:      General: Abdomen is flat  Bowel sounds are normal  There is no distension  Palpations: Abdomen is soft  Tenderness: There is no abdominal tenderness  There is no guarding or rebound  Skin:     General: Skin is warm and dry  Coloration: Skin is not jaundiced  Neurological:      General: No focal deficit present  Mental Status: She is alert and oriented to person, place, and time  Psychiatric:         Mood and Affect: Mood normal          Behavior: Behavior normal        Lab Results:   No visits with results within 1 Day(s) from this visit  Latest known visit with results is:   Appointment on 01/06/2023   Component Date Value   • Chromogranin A 01/06/2023 79 7      Radiology Results:   No results found  Herlinda Fatima PA-C    **Please note:  Dictation voice to text software may have been used in the creation of this record  Occasional wrong word or “sound alike” substitutions may have occurred due to the inherent limitations of voice recognition software  Read the chart carefully and recognize, using context, where substitutions have occurred  **

## 2023-02-01 NOTE — PATIENT INSTRUCTIONS
Please have the special colonoscopy completed at [address]  This will look at the end of your small intestine where you had the neuroendocrine tumor removed in the past    Continue with high fiber diet and constipation prevention

## 2023-02-02 ENCOUNTER — TELEPHONE (OUTPATIENT)
Dept: GASTROENTEROLOGY | Facility: CLINIC | Age: 73
End: 2023-02-02

## 2023-02-02 NOTE — TELEPHONE ENCOUNTER
----- Message from Krish Beaulieu sent at 2/1/2023  1:48 PM EST -----  Hello,     This patient will need a balloon colonoscopy scheduled in [place]  Can you please help schedule this for her ?      Thank you,   [facility]

## 2023-02-06 NOTE — TELEPHONE ENCOUNTER
Scheduled date of colonoscopy/SBE (as of today): 2/21/23  Physician performing colonoscopy/SBE: Dr Ruiz  Location of colonoscopy/SBE: [facility]  Bowel prep reviewed with patient: Golytely  Instructions reviewed with patient by: María/ester  Clearances: N/A

## 2023-02-18 DIAGNOSIS — I10 ESSENTIAL HYPERTENSION: ICD-10-CM

## 2023-02-18 RX ORDER — LISINOPRIL 20 MG/1
TABLET ORAL
Qty: 180 TABLET | Refills: 3 | Status: SHIPPED | OUTPATIENT
Start: 2023-02-18

## 2023-02-20 ENCOUNTER — ANESTHESIA (OUTPATIENT)
Dept: ANESTHESIOLOGY | Facility: HOSPITAL | Age: 73
End: 2023-02-20

## 2023-02-20 ENCOUNTER — ANESTHESIA EVENT (OUTPATIENT)
Dept: ANESTHESIOLOGY | Facility: HOSPITAL | Age: 73
End: 2023-02-20

## 2023-02-20 NOTE — ANESTHESIA PREPROCEDURE EVALUATION
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)   (+) Splenic lesion        Physical Exam    Airway    Mallampati score: II  TM Distance: >3 FB  Neck ROM: full     Dental   No notable dental hx     Cardiovascular  Cardiovascular exam normal    Pulmonary  Pulmonary exam normal     Other Findings        Anesthesia Plan  ASA Score- 3     Anesthesia Type- IV sedation with anesthesia with ASA Monitors  Additional Monitors:   Airway Plan:           Plan Factors-Exercise tolerance (METS): >4 METS  Chart reviewed  EKG reviewed  Imaging results reviewed  Existing labs reviewed  Patient summary reviewed  Patient is not a current smoker  Patient not instructed to abstain from smoking on day of procedure  Patient did not smoke on day of surgery  Obstructive sleep apnea risk education given perioperatively  Induction-     Postoperative Plan-     Informed Consent- Anesthetic plan and risks discussed with patient  I personally reviewed this patient with the CRNA  Discussed and agreed on the Anesthesia Plan with the CRNA  Amadou Caban

## 2023-02-21 ENCOUNTER — ANESTHESIA (OUTPATIENT)
Dept: GASTROENTEROLOGY | Facility: HOSPITAL | Age: 73
End: 2023-02-21

## 2023-02-21 ENCOUNTER — HOSPITAL ENCOUNTER (OUTPATIENT)
Dept: GASTROENTEROLOGY | Facility: HOSPITAL | Age: 73
Setting detail: OUTPATIENT SURGERY
Discharge: HOME | End: 2023-02-21
Attending: INTERNAL MEDICINE

## 2023-02-21 ENCOUNTER — ANESTHESIA EVENT (OUTPATIENT)
Dept: GASTROENTEROLOGY | Facility: HOSPITAL | Age: 73
End: 2023-02-21

## 2023-02-21 VITALS
DIASTOLIC BLOOD PRESSURE: 69 MMHG | SYSTOLIC BLOOD PRESSURE: 140 MMHG | OXYGEN SATURATION: 98 % | HEART RATE: 72 BPM | RESPIRATION RATE: 18 BRPM | TEMPERATURE: 98.1 F

## 2023-02-21 DIAGNOSIS — R93.5 ABNORMAL CT OF THE ABDOMEN: ICD-10-CM

## 2023-02-21 DIAGNOSIS — Z85.9 HISTORY OF MALIGNANT NEUROENDOCRINE TUMOR: ICD-10-CM

## 2023-02-21 RX ORDER — GLYCOPYRROLATE 0.2 MG/ML
INJECTION INTRAMUSCULAR; INTRAVENOUS AS NEEDED
Status: DISCONTINUED | OUTPATIENT
Start: 2023-02-21 | End: 2023-02-21

## 2023-02-21 RX ORDER — SODIUM CHLORIDE 9 MG/ML
INJECTION, SOLUTION INTRAVENOUS CONTINUOUS PRN
Status: DISCONTINUED | OUTPATIENT
Start: 2023-02-21 | End: 2023-02-21

## 2023-02-21 RX ORDER — LIDOCAINE HYDROCHLORIDE 10 MG/ML
INJECTION, SOLUTION EPIDURAL; INFILTRATION; INTRACAUDAL; PERINEURAL AS NEEDED
Status: DISCONTINUED | OUTPATIENT
Start: 2023-02-21 | End: 2023-02-21

## 2023-02-21 RX ORDER — EPHEDRINE SULFATE 50 MG/ML
INJECTION INTRAVENOUS AS NEEDED
Status: DISCONTINUED | OUTPATIENT
Start: 2023-02-21 | End: 2023-02-21

## 2023-02-21 RX ORDER — PROPOFOL 10 MG/ML
INJECTION, EMULSION INTRAVENOUS AS NEEDED
Status: DISCONTINUED | OUTPATIENT
Start: 2023-02-21 | End: 2023-02-21

## 2023-02-21 RX ORDER — PHENYLEPHRINE HYDROCHLORIDE 10 MG/ML
INJECTION INTRAVENOUS AS NEEDED
Status: DISCONTINUED | OUTPATIENT
Start: 2023-02-21 | End: 2023-02-21

## 2023-02-21 RX ORDER — PROPOFOL 10 MG/ML
INJECTION, EMULSION INTRAVENOUS CONTINUOUS PRN
Status: DISCONTINUED | OUTPATIENT
Start: 2023-02-21 | End: 2023-02-21

## 2023-02-21 RX ADMIN — SODIUM CHLORIDE: 0.9 INJECTION, SOLUTION INTRAVENOUS at 14:57

## 2023-02-21 RX ADMIN — EPHEDRINE SULFATE 10 MG: 50 INJECTION INTRAVENOUS at 15:11

## 2023-02-21 RX ADMIN — EPHEDRINE SULFATE 5 MG: 50 INJECTION INTRAVENOUS at 15:17

## 2023-02-21 RX ADMIN — PROPOFOL 20 MG: 10 INJECTION, EMULSION INTRAVENOUS at 15:05

## 2023-02-21 RX ADMIN — PHENYLEPHRINE HYDROCHLORIDE 100 MCG: 10 INJECTION INTRAVENOUS at 15:17

## 2023-02-21 RX ADMIN — PROPOFOL 120 MCG/KG/MIN: 10 INJECTION, EMULSION INTRAVENOUS at 14:59

## 2023-02-21 RX ADMIN — EPHEDRINE SULFATE 5 MG: 50 INJECTION INTRAVENOUS at 15:32

## 2023-02-21 RX ADMIN — PHENYLEPHRINE HYDROCHLORIDE 200 MCG: 10 INJECTION INTRAVENOUS at 15:23

## 2023-02-21 RX ADMIN — PROPOFOL 30 MG: 10 INJECTION, EMULSION INTRAVENOUS at 14:59

## 2023-02-21 RX ADMIN — GLYCOPYRROLATE 0.2 MG: 0.2 INJECTION INTRAMUSCULAR; INTRAVENOUS at 15:06

## 2023-02-21 RX ADMIN — LIDOCAINE HYDROCHLORIDE 50 MG: 10 INJECTION, SOLUTION EPIDURAL; INFILTRATION; INTRACAUDAL; PERINEURAL at 14:58

## 2023-02-21 RX ADMIN — PHENYLEPHRINE HYDROCHLORIDE 100 MCG: 10 INJECTION INTRAVENOUS at 15:32

## 2023-02-21 NOTE — ANESTHESIA PREPROCEDURE EVALUATION
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)   (+) Splenic lesion        Physical Exam    Airway    Mallampati score: II  TM Distance: >3 FB  Neck ROM: full     Dental   No notable dental hx     Cardiovascular  Cardiovascular exam normal    Pulmonary  Pulmonary exam normal     Other Findings        Anesthesia Plan  ASA Score- 3     Anesthesia Type- IV sedation with anesthesia with ASA Monitors  Additional Monitors:   Airway Plan:           Plan Factors-Exercise tolerance (METS): >4 METS  Chart reviewed  EKG reviewed  Imaging results reviewed  Existing labs reviewed  Patient summary reviewed  Patient is not a current smoker  Patient not instructed to abstain from smoking on day of procedure  Patient did not smoke on day of surgery  Obstructive sleep apnea risk education given perioperatively  Induction-     Postoperative Plan-     Informed Consent- Anesthetic plan and risks discussed with patient  I personally reviewed this patient with the CRNA  Discussed and agreed on the Anesthesia Plan with the CRNA  Lucie Mancia

## 2023-02-21 NOTE — H&P
History and Physical - [facility]  Jose Gonzalez 71 y o  female MRN: [id number]    HPI: Jose Gonzalez is a 71y o  year old female who presents with small bowel wall thickening at anastomosis (NET)         Review of Systems    Historical Information   Past Medical History:   Diagnosis Date   • Colon polyps    • Essential hypertension    • Hematuria syndrome    • Osteoarthritis    • Proteinuria    • Vitamin D deficiency      Past Surgical History:   Procedure Laterality Date   • BREAST BIOPSY Right 2017    benign   • COLONOSCOPY  2018    3 polyps    • COLONOSCOPY      several   • MAMMO STEREOTACTIC BREAST BIOPSY LEFT (ALL INC) Left 2020   • MAMMO STEREOTACTIC BREAST BIOPSY RIGHT (ALL INC) Right 2020   • [place] LAPS ABD PRTM&OMENTUM DX W/WO SPEC BR/WA SPX N/A 3/24/2020    Procedure: 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;  Surgeon: Jeanine Valenzuela MD;  Location: [facility] MAIN OR;  Service: General   • RECTAL BIOPSY N/A 2018    Procedure: TRANSANAL EXCISION RECTAL POLYP;MUCOSAL POLYPECTOMY;  Surgeon: Daniele Garcia MD;  Location: [facility] MAIN [facility];  Service: Colorectal   • SMALL INTESTINE SURGERY N/A 2020    Procedure: RESECTION SMALL BOWEL;  Surgeon: Ant Huynh MD;  Location: [facility] MAIN [facility];  Service: Surgical Oncology     Social History   Social History     Substance and Sexual Activity   Alcohol Use Not Currently    Comment: occasional     Social History     Substance and Sexual Activity   Drug Use Never     Social History     Tobacco Use   Smoking Status Former   • Types: Cigarettes   • Quit date:    • Years since quittin 1   Smokeless Tobacco Never   Tobacco Comments    27  YEARS AGO      Family History   Problem Relation Age of Onset   • Hypertension Mother    • Heart disease Father    • No Known Problems Daughter    • No Known Problems Maternal Grandmother    • No Known Problems Maternal Grandfather    • No Known Problems Paternal Grandmother    • No Known Problems Paternal Grandfather    • Pancreatic cancer Brother    • No Known Problems Maternal Aunt    • No Known Problems Paternal Aunt    • No Known Problems Paternal Aunt        Meds/Allergies     (Not in a hospital admission)      No Known Allergies    Objective     BP (!) 180/87   Pulse 83   Temp 98 2 °F (36 8 °C) (Tympanic)   Resp 18   SpO2 98%       PHYSICAL EXAM    Gen: NAD  CV: RRR  CHEST: Clear  ABD: soft, NT/ND  EXT: no edema  Neuro: AAO      ASSESSMENT/PLAN:  This is a 71y o  year old female here for colonoscopy (retrograde enteroscopy) small bowel anastomosis evaluation  PLAN:   Procedure: retrograde single balloon enteroscopy

## 2023-02-21 NOTE — ANESTHESIA POSTPROCEDURE EVALUATION
Post-Op Assessment Note    CV Status:  Stable  Pain Score: 0    Pain management: adequate     Mental Status:  Sleepy   Hydration Status:  Euvolemic   PONV Controlled:  None   Airway Patency:  Patent      Post Op Vitals Reviewed: Yes      Staff: CRNA         No notable events documented      BP (!) 96/49 (02/21/23 1555)    Temp 98 1 °F (36 7 °C) (02/21/23 1555)    Pulse 68 (02/21/23 1555)   Resp 18 (02/21/23 1555)    SpO2 97 % (02/21/23 1555)

## 2023-02-22 PROBLEM — Z98.890 HISTORY OF COLONOSCOPY: Status: ACTIVE | Noted: 2023-02-22

## 2023-03-14 ENCOUNTER — VBI (OUTPATIENT)
Dept: ADMINISTRATIVE | Facility: OTHER | Age: 73
End: 2023-03-14

## 2023-03-28 ENCOUNTER — APPOINTMENT (OUTPATIENT)
Dept: LAB | Facility: CLINIC | Age: 73
End: 2023-03-28

## 2023-03-28 DIAGNOSIS — Z85.9 HISTORY OF MALIGNANT NEUROENDOCRINE TUMOR: ICD-10-CM

## 2023-03-28 DIAGNOSIS — N18.31 STAGE 3A CHRONIC KIDNEY DISEASE (HCC): ICD-10-CM

## 2023-03-28 LAB
ALBUMIN SERPL BCP-MCNC: 3.8 G/DL (ref 3.5–5)
ALP SERPL-CCNC: 63 U/L (ref 46–116)
ALT SERPL W P-5'-P-CCNC: 14 U/L (ref 12–78)
ANION GAP SERPL CALCULATED.3IONS-SCNC: 3 MMOL/L (ref 4–13)
AST SERPL W P-5'-P-CCNC: 12 U/L (ref 5–45)
BACTERIA UR QL AUTO: ABNORMAL /HPF
BASOPHILS # BLD AUTO: 0.05 THOUSANDS/ÂΜL (ref 0–0.1)
BASOPHILS NFR BLD AUTO: 1 % (ref 0–1)
BILIRUB SERPL-MCNC: 0.43 MG/DL (ref 0.2–1)
BILIRUB UR QL STRIP: NEGATIVE
BUN SERPL-MCNC: 18 MG/DL (ref 5–25)
CALCIUM SERPL-MCNC: 9.4 MG/DL (ref 8.3–10.1)
CHLORIDE SERPL-SCNC: 104 MMOL/L (ref 96–108)
CLARITY UR: ABNORMAL
CO2 SERPL-SCNC: 27 MMOL/L (ref 21–32)
COLOR UR: YELLOW
CREAT SERPL-MCNC: 1.02 MG/DL (ref 0.6–1.3)
CREAT UR-MCNC: 150 MG/DL
CREAT UR-MCNC: 150 MG/DL
EOSINOPHIL # BLD AUTO: 0.09 THOUSAND/ÂΜL (ref 0–0.61)
EOSINOPHIL NFR BLD AUTO: 1 % (ref 0–6)
ERYTHROCYTE [DISTWIDTH] IN BLOOD BY AUTOMATED COUNT: 12.7 % (ref 11.6–15.1)
GFR SERPL CREATININE-BSD FRML MDRD: 55 ML/MIN/1.73SQ M
GLUCOSE P FAST SERPL-MCNC: 87 MG/DL (ref 65–99)
GLUCOSE UR STRIP-MCNC: NEGATIVE MG/DL
HCT VFR BLD AUTO: 39.1 % (ref 34.8–46.1)
HGB BLD-MCNC: 13.1 G/DL (ref 11.5–15.4)
HGB UR QL STRIP.AUTO: NEGATIVE
HYALINE CASTS #/AREA URNS LPF: ABNORMAL /LPF
IMM GRANULOCYTES # BLD AUTO: 0.02 THOUSAND/UL (ref 0–0.2)
IMM GRANULOCYTES NFR BLD AUTO: 0 % (ref 0–2)
KETONES UR STRIP-MCNC: NEGATIVE MG/DL
LEUKOCYTE ESTERASE UR QL STRIP: ABNORMAL
LYMPHOCYTES # BLD AUTO: 2.79 THOUSANDS/ÂΜL (ref 0.6–4.47)
LYMPHOCYTES NFR BLD AUTO: 41 % (ref 14–44)
MCH RBC QN AUTO: 29.6 PG (ref 26.8–34.3)
MCHC RBC AUTO-ENTMCNC: 33.5 G/DL (ref 31.4–37.4)
MCV RBC AUTO: 88 FL (ref 82–98)
MICROALBUMIN UR-MCNC: 55.3 MG/L (ref 0–20)
MICROALBUMIN/CREAT 24H UR: 37 MG/G CREATININE (ref 0–30)
MONOCYTES # BLD AUTO: 0.74 THOUSAND/ÂΜL (ref 0.17–1.22)
MONOCYTES NFR BLD AUTO: 11 % (ref 4–12)
MUCOUS THREADS UR QL AUTO: ABNORMAL
NEUTROPHILS # BLD AUTO: 3.15 THOUSANDS/ÂΜL (ref 1.85–7.62)
NEUTS SEG NFR BLD AUTO: 46 % (ref 43–75)
NITRITE UR QL STRIP: NEGATIVE
NON-SQ EPI CELLS URNS QL MICRO: ABNORMAL /HPF
NRBC BLD AUTO-RTO: 0 /100 WBCS
PH UR STRIP.AUTO: 6 [PH]
PLATELET # BLD AUTO: 452 THOUSANDS/UL (ref 149–390)
PMV BLD AUTO: 9.1 FL (ref 8.9–12.7)
POTASSIUM SERPL-SCNC: 4.2 MMOL/L (ref 3.5–5.3)
PROT SERPL-MCNC: 7.5 G/DL (ref 6.4–8.4)
PROT UR STRIP-MCNC: ABNORMAL MG/DL
PROT UR-MCNC: 19 MG/DL
PROT/CREAT UR: 0.13 MG/G{CREAT} (ref 0–0.1)
RBC # BLD AUTO: 4.43 MILLION/UL (ref 3.81–5.12)
RBC #/AREA URNS AUTO: ABNORMAL /HPF
SODIUM SERPL-SCNC: 134 MMOL/L (ref 135–147)
SP GR UR STRIP.AUTO: 1.02 (ref 1–1.03)
UROBILINOGEN UR STRIP-ACNC: <2 MG/DL
WBC # BLD AUTO: 6.84 THOUSAND/UL (ref 4.31–10.16)
WBC #/AREA URNS AUTO: ABNORMAL /HPF

## 2023-03-30 LAB — CGA SERPL-MCNC: 74 NG/ML (ref 0–101.8)

## 2023-04-14 PROBLEM — J30.2 SEASONAL ALLERGIES: Status: ACTIVE | Noted: 2023-04-14

## 2023-06-20 NOTE — ED NOTES
Drinking completed  Pt tolerated without any difficulty  Denies pain or discomfort at this time  HOB elevated        Michelle Ruelas, RN  10/05/19 5930 Well-controlled. Continue Cardizem CD3 150 mg daily, Lasix 40 mg daily, Aldactone 25 mg daily. Increase metoprolol to 100 mg twice daily for improved rate control.

## 2023-07-10 DIAGNOSIS — I10 ESSENTIAL HYPERTENSION: ICD-10-CM

## 2023-07-10 RX ORDER — METOPROLOL TARTRATE 100 MG/1
TABLET ORAL
Qty: 180 TABLET | Refills: 3 | Status: SHIPPED | OUTPATIENT
Start: 2023-07-10

## 2023-08-30 ENCOUNTER — TELEPHONE (OUTPATIENT)
Dept: INTERNAL MEDICINE CLINIC | Facility: CLINIC | Age: 73
End: 2023-08-30

## 2023-08-30 DIAGNOSIS — J32.9 SINUSITIS, UNSPECIFIED CHRONICITY, UNSPECIFIED LOCATION: ICD-10-CM

## 2023-08-30 RX ORDER — FLUTICASONE PROPIONATE 50 MCG
1 SPRAY, SUSPENSION (ML) NASAL DAILY
Qty: 48 ML | Refills: 1 | Status: SHIPPED | OUTPATIENT
Start: 2023-08-30

## 2023-10-03 ENCOUNTER — APPOINTMENT (OUTPATIENT)
Dept: LAB | Facility: CLINIC | Age: 73
End: 2023-10-03
Payer: COMMERCIAL

## 2023-10-03 DIAGNOSIS — Z85.9 HISTORY OF MALIGNANT NEUROENDOCRINE TUMOR: ICD-10-CM

## 2023-10-03 LAB
BUN SERPL-MCNC: 13 MG/DL (ref 5–25)
CREAT SERPL-MCNC: 1.01 MG/DL (ref 0.6–1.3)
GFR SERPL CREATININE-BSD FRML MDRD: 55 ML/MIN/1.73SQ M

## 2023-10-03 PROCEDURE — 36415 COLL VENOUS BLD VENIPUNCTURE: CPT

## 2023-10-03 PROCEDURE — 86316 IMMUNOASSAY TUMOR OTHER: CPT

## 2023-10-03 PROCEDURE — 84520 ASSAY OF UREA NITROGEN: CPT

## 2023-10-03 PROCEDURE — 82565 ASSAY OF CREATININE: CPT

## 2023-10-05 LAB — CGA SERPL-MCNC: 72.3 NG/ML (ref 0–101.8)

## 2023-10-06 ENCOUNTER — RA CDI HCC (OUTPATIENT)
Dept: OTHER | Facility: HOSPITAL | Age: 73
End: 2023-10-06

## 2023-10-06 NOTE — PROGRESS NOTES
[address] coding opportunities       Chart reviewed, no opportunity found: [address] Review     Patients Insurance     Medicare Insurance: [employer]

## 2023-10-12 ENCOUNTER — RA CDI HCC (OUTPATIENT)
Dept: OTHER | Facility: HOSPITAL | Age: 73
End: 2023-10-12

## 2023-10-13 NOTE — PROGRESS NOTES
[address] [facility] coding opportunities       Chart reviewed, no opportunity found: CHART REVIEWED, [facility]     Patients Insurance     Medicare Insurance: [employer]

## 2023-10-15 NOTE — PATIENT INSTRUCTIONS
Medicare Preventive Visit Patient Instructions  Thank you for completing your Welcome to Medicare Visit or Medicare Annual Wellness Visit today. Your next wellness visit will be due in one year (10/16/2024). The screening/preventive services that you may require over the next 5-10 years are detailed below. Some tests may not apply to you based off risk factors and/or age. Screening tests ordered at today's visit but not completed yet may show as past due. Also, please note that scanned in results may not display below. Preventive Screenings:  Service Recommendations Previous Testing/Comments   Colorectal Cancer Screening  * Colonoscopy    * Fecal Occult Blood Test (FOBT)/Fecal Immunochemical Test (FIT)  * Fecal DNA/Cologuard Test  * Flexible Sigmoidoscopy Age: 43-73 years old   Colonoscopy: every 10 years (may be performed more frequently if at higher risk)  OR  FOBT/FIT: every 1 year  OR  Cologuard: every 3 years  OR  Sigmoidoscopy: every 5 years  Screening may be recommended earlier than age 39 if at higher risk for colorectal cancer. Also, an individualized decision between you and your healthcare provider will decide whether screening between the ages of 77-80 would be appropriate. Colonoscopy: 02/21/2023  FOBT/FIT: Not on file  Cologuard: Not on file  Sigmoidoscopy: Not on file    Screening Current  History Colorectal Cancer     Breast Cancer Screening Age: 37 years old  Frequency: every 1-2 years  Not required if history of left and right mastectomy Mammogram: 05/11/2022    Screening Current   Cervical Cancer Screening Between the ages of 21-29, pap smear recommended once every 3 years. Between the ages of 32-69, can perform pap smear with HPV co-testing every 5 years.    Recommendations may differ for women with a history of total hysterectomy, cervical cancer, or abnormal pap smears in past. Pap Smear: 12/08/2020    Screening Not Indicated   Hepatitis C Screening Once for adults born between 1945 and 1965  More frequently in patients at high risk for Hepatitis C Hep C Antibody: Not on file        Diabetes Screening 1-2 times per year if you're at risk for diabetes or have pre-diabetes Fasting glucose: 87 mg/dL (3/28/2023)  A1C: 5.3 % (1/6/2023)  Screening Current   Cholesterol Screening Once every 5 years if you don't have a lipid disorder. May order more often based on risk factors. Lipid panel: 01/06/2023    Screening Not Indicated  History Lipid Disorder     Other Preventive Screenings Covered by Medicare:  Abdominal Aortic Aneurysm (AAA) Screening: covered once if your at risk. You're considered to be at risk if you have a family history of AAA. Lung Cancer Screening: covers low dose CT scan once per year if you meet all of the following conditions: (1) Age 48-67; (2) No signs or symptoms of lung cancer; (3) Current smoker or have quit smoking within the last 15 years; (4) You have a tobacco smoking history of at least 20 pack years (packs per day multiplied by number of years you smoked); (5) You get a written order from a healthcare provider. Glaucoma Screening: covered annually if you're considered high risk: (1) You have diabetes OR (2) Family history of glaucoma OR (3)  aged 48 and older OR (3)  American aged 72 and older  Osteoporosis Screening: covered every 2 years if you meet one of the following conditions: (1) You're estrogen deficient and at risk for osteoporosis based off medical history and other findings; (2) Have a vertebral abnormality; (3) On glucocorticoid therapy for more than 3 months; (4) Have primary hyperparathyroidism; (5) On osteoporosis medications and need to assess response to drug therapy. Last bone density test (DXA Scan): 05/11/2022. HIV Screening: covered annually if you're between the age of 14-79. Also covered annually if you are younger than 13 and older than 72 with risk factors for HIV infection.  For pregnant patients, it is covered up to 3 times per pregnancy. Immunizations:  Immunization Recommendations   Influenza Vaccine Annual influenza vaccination during flu season is recommended for all persons aged >= 6 months who do not have contraindications   Pneumococcal Vaccine   * Pneumococcal conjugate vaccine = PCV13 (Prevnar 13), PCV15 (Vaxneuvance), PCV20 (Prevnar 20)  * Pneumococcal polysaccharide vaccine = PPSV23 (Pneumovax) Adults 85-58 yo with certain risk factors or if 69+ yo  If never received any pneumonia vaccine: recommend Prevnar 20 (PCV20)  Give PCV20 if previously received 1 dose of PCV13 or PPSV23   Hepatitis B Vaccine 3 dose series if at intermediate or high risk (ex: diabetes, end stage renal disease, liver disease)   Respiratory syncytial virus (RSV) Vaccine - COVERED BY MEDICARE PART D  * RSVPreF3 (Arexvy) CDC recommends that adults 61years of age and older may receive a single dose of RSV vaccine using shared clinical decision-making (SCDM)   Tetanus (Td) Vaccine - COST NOT COVERED BY MEDICARE PART B Following completion of primary series, a booster dose should be given every 10 years to maintain immunity against tetanus. Td may also be given as tetanus wound prophylaxis. Tdap Vaccine - COST NOT COVERED BY MEDICARE PART B Recommended at least once for all adults. For pregnant patients, recommended with each pregnancy. Shingles Vaccine (Shingrix) - COST NOT COVERED BY MEDICARE PART B  2 shot series recommended in those 19 years and older who have or will have weakened immune systems or those 50 years and older     Health Maintenance Due:      Topic Date Due    Hepatitis C Screening  Never done    Breast Cancer Screening: Mammogram  05/11/2023    Colorectal Cancer Screening  02/18/2033     Immunizations Due:      Topic Date Due    COVID-19 Vaccine (2 - Carmelo risk series) 05/04/2021    Influenza Vaccine (1) 09/01/2023     Advance Directives   What are advance directives?   Advance directives are legal documents that state your wishes and plans for medical care. These plans are made ahead of time in case you lose your ability to make decisions for yourself. Advance directives can apply to any medical decision, such as the treatments you want, and if you want to donate organs. What are the types of advance directives? There are many types of advance directives, and each state has rules about how to use them. You may choose a combination of any of the following:  Living will: This is a written record of the treatment you want. You can also choose which treatments you do not want, which to limit, and which to stop at a certain time. This includes surgery, medicine, IV fluid, and tube feedings. Durable power of  for [facility]): This is a written record that states who you want to make healthcare choices for you when you are unable to make them for yourself. This person, called a proxy, is usually a family member or a friend. You may choose more than 1 proxy. Do not resuscitate (DNR) order:  A DNR order is used in case your heart stops beating or you stop breathing. It is a request not to have certain forms of treatment, such as CPR. A DNR order may be included in other types of advance directives. Medical directive: This covers the care that you want if you are in a coma, near death, or unable to make decisions for yourself. You can list the treatments you want for each condition. Treatment may include pain medicine, surgery, blood transfusions, dialysis, IV or tube feedings, and a ventilator (breathing machine). Values history: This document has questions about your views, beliefs, and how you feel and think about life. This information can help others choose the care that you would choose. Why are advance directives important? An advance directive helps you control your care. Although spoken wishes may be used, it is better to have your wishes written down. Spoken wishes can be misunderstood, or not followed. Treatments may be given even if you do not want them. An advance directive may make it easier for your family to make difficult choices about your care. Weight Management   Why it is important to manage your weight:  Being overweight increases your risk of health conditions such as heart disease, high blood pressure, type 2 diabetes, and certain types of cancer. It can also increase your risk for osteoarthritis, sleep apnea, and other respiratory problems. Aim for a slow, steady weight loss. Even a small amount of weight loss can lower your risk of health problems. How to lose weight safely:  A safe and healthy way to lose weight is to eat fewer calories and get regular exercise. You can lose up about 1 pound a week by decreasing the number of calories you eat by 500 calories each day. Healthy meal plan for weight management:  A healthy meal plan includes a variety of foods, contains fewer calories, and helps you stay healthy. A healthy meal plan includes the following:  Eat whole-grain foods more often. A healthy meal plan should contain fiber. Fiber is the part of grains, fruits, and vegetables that is not broken down by your body. Whole-grain foods are healthy and provide extra fiber in your diet. Some examples of whole-grain foods are whole-wheat breads and pastas, oatmeal, brown rice, and bulgur. Eat a variety of vegetables every day. Include dark, leafy greens such as spinach, kale, khanh greens, and mustard greens. Eat yellow and orange vegetables such as carrots, sweet potatoes, and winter squash. Eat a variety of fruits every day. Choose fresh or canned fruit (canned in its own juice or light syrup) instead of juice. Fruit juice has very little or no fiber. Eat low-fat dairy foods. Drink fat-free (skim) milk or 1% milk. Eat fat-free yogurt and low-fat cottage cheese. Try low-fat cheeses such as mozzarella and other reduced-fat cheeses. Choose meat and other protein foods that are low in fat. Choose beans or other legumes such as split peas or lentils. Choose fish, skinless poultry (chicken or turkey), or lean cuts of red meat (beef or pork). Before you cook meat or poultry, cut off any visible fat. Use less fat and oil. Try baking foods instead of frying them. Add less fat, such as margarine, sour cream, regular salad dressing and mayonnaise to foods. Eat fewer high-fat foods. Some examples of high-fat foods include french fries, doughnuts, ice cream, and cakes. Eat fewer sweets. Limit foods and drinks that are high in sugar. This includes candy, cookies, regular soda, and sweetened drinks. Exercise:  Exercise at least 30 minutes per day on most days of the week. Some examples of exercise include walking, biking, dancing, and swimming. You can also fit in more physical activity by taking the stairs instead of the elevator or parking farther away from stores. Ask your healthcare provider about the best exercise plan for you. © Copyright [employer] 2018 Information is for End User's use only and may not be sold, redistributed or otherwise used for commercial purposes. All illustrations and images included in CareNotes® are the copyrighted property of [employer] or [employer] [facility] Preventive Visit Patient Instructions  Thank you for completing your Welcome to Medicare Visit or Medicare Annual Wellness Visit today. Your next wellness visit will be due in one year (10/16/2024). The screening/preventive services that you may require over the next 5-10 years are detailed below. Some tests may not apply to you based off risk factors and/or age. Screening tests ordered at today's visit but not completed yet may show as past due. Also, please note that scanned in results may not display below.   Preventive Screenings:  Service Recommendations Previous Testing/Comments   Colorectal Cancer Screening  * Colonoscopy    * Fecal Occult Blood Test (FOBT)/Fecal Immunochemical Test (FIT)  * Fecal DNA/Cologuard Test  * Flexible Sigmoidoscopy Age: 43-73 years old   Colonoscopy: every 10 years (may be performed more frequently if at higher risk)  OR  FOBT/FIT: every 1 year  OR  Cologuard: every 3 years  OR  Sigmoidoscopy: every 5 years  Screening may be recommended earlier than age 39 if at higher risk for colorectal cancer. Also, an individualized decision between you and your healthcare provider will decide whether screening between the ages of 77-80 would be appropriate. Colonoscopy: 02/21/2023  FOBT/FIT: Not on file  Cologuard: Not on file  Sigmoidoscopy: Not on file    Screening Current  History Colorectal Cancer     Breast Cancer Screening Age: 37 years old  Frequency: every 1-2 years  Not required if history of left and right mastectomy Mammogram: 05/11/2022    Screening Current   Cervical Cancer Screening Between the ages of 21-29, pap smear recommended once every 3 years. Between the ages of 32-69, can perform pap smear with HPV co-testing every 5 years. Recommendations may differ for women with a history of total hysterectomy, cervical cancer, or abnormal pap smears in past. Pap Smear: 12/08/2020    Screening Not Indicated   Hepatitis C Screening Once for adults born between 1945 and 1965  More frequently in patients at high risk for Hepatitis C Hep C Antibody: Not on file        Diabetes Screening 1-2 times per year if you're at risk for diabetes or have pre-diabetes Fasting glucose: 87 mg/dL (3/28/2023)  A1C: 5.3 % (1/6/2023)  Screening Current   Cholesterol Screening Once every 5 years if you don't have a lipid disorder. May order more often based on risk factors. Lipid panel: 01/06/2023    Screening Not Indicated  History Lipid Disorder     Other Preventive Screenings Covered by Medicare:  Abdominal Aortic Aneurysm (AAA) Screening: covered once if your at risk. You're considered to be at risk if you have a family history of AAA.   Lung Cancer Screening: covers low dose CT scan once per year if you meet all of the following conditions: (1) Age 48-67; (2) No signs or symptoms of lung cancer; (3) Current smoker or have quit smoking within the last 15 years; (4) You have a tobacco smoking history of at least 20 pack years (packs per day multiplied by number of years you smoked); (5) You get a written order from a healthcare provider. Glaucoma Screening: covered annually if you're considered high risk: (1) You have diabetes OR (2) Family history of glaucoma OR (3)  aged 48 and older OR (3)  American aged 72 and older  Osteoporosis Screening: covered every 2 years if you meet one of the following conditions: (1) You're estrogen deficient and at risk for osteoporosis based off medical history and other findings; (2) Have a vertebral abnormality; (3) On glucocorticoid therapy for more than 3 months; (4) Have primary hyperparathyroidism; (5) On osteoporosis medications and need to assess response to drug therapy. Last bone density test (DXA Scan): 05/11/2022. HIV Screening: covered annually if you're between the age of 14-79. Also covered annually if you are younger than 13 and older than 72 with risk factors for HIV infection. For pregnant patients, it is covered up to 3 times per pregnancy.     Immunizations:  Immunization Recommendations   Influenza Vaccine Annual influenza vaccination during flu season is recommended for all persons aged >= 6 months who do not have contraindications   Pneumococcal Vaccine   * Pneumococcal conjugate vaccine = PCV13 (Prevnar 13), PCV15 (Vaxneuvance), PCV20 (Prevnar 20)  * Pneumococcal polysaccharide vaccine = PPSV23 (Pneumovax) Adults 63-49 yo with certain risk factors or if 69+ yo  If never received any pneumonia vaccine: recommend Prevnar 20 (PCV20)  Give PCV20 if previously received 1 dose of PCV13 or PPSV23   Hepatitis B Vaccine 3 dose series if at intermediate or high risk (ex: diabetes, end stage renal disease, liver disease) Respiratory syncytial virus (RSV) Vaccine - COVERED BY MEDICARE PART D  * RSVPreF3 (Arexvy) CDC recommends that adults 61years of age and older may receive a single dose of RSV vaccine using shared clinical decision-making (SCDM)   Tetanus (Td) Vaccine - COST NOT COVERED BY MEDICARE PART B Following completion of primary series, a booster dose should be given every 10 years to maintain immunity against tetanus. Td may also be given as tetanus wound prophylaxis. Tdap Vaccine - COST NOT COVERED BY MEDICARE PART B Recommended at least once for all adults. For pregnant patients, recommended with each pregnancy. Shingles Vaccine (Shingrix) - COST NOT COVERED BY MEDICARE PART B  2 shot series recommended in those 19 years and older who have or will have weakened immune systems or those 50 years and older     Health Maintenance Due:      Topic Date Due    Hepatitis C Screening  Never done    Breast Cancer Screening: Mammogram  05/11/2023    Colorectal Cancer Screening  02/18/2033     Immunizations Due:      Topic Date Due    COVID-19 Vaccine (2 - Carmelo risk series) 05/04/2021    Influenza Vaccine (1) 09/01/2023     Advance Directives   What are advance directives? Advance directives are legal documents that state your wishes and plans for medical care. These plans are made ahead of time in case you lose your ability to make decisions for yourself. Advance directives can apply to any medical decision, such as the treatments you want, and if you want to donate organs. What are the types of advance directives? There are many types of advance directives, and each state has rules about how to use them. You may choose a combination of any of the following:  Living will: This is a written record of the treatment you want. You can also choose which treatments you do not want, which to limit, and which to stop at a certain time. This includes surgery, medicine, IV fluid, and tube feedings.    Durable power of  [facility]): This is a written record that states who you want to make healthcare choices for you when you are unable to make them for yourself. This person, called a proxy, is usually a family member or a friend. You may choose more than 1 proxy. Do not resuscitate (DNR) order:  A DNR order is used in case your heart stops beating or you stop breathing. It is a request not to have certain forms of treatment, such as CPR. A DNR order may be included in other types of advance directives. Medical directive: This covers the care that you want if you are in a coma, near death, or unable to make decisions for yourself. You can list the treatments you want for each condition. Treatment may include pain medicine, surgery, blood transfusions, dialysis, IV or tube feedings, and a ventilator (breathing machine). Values history: This document has questions about your views, beliefs, and how you feel and think about life. This information can help others choose the care that you would choose. Why are advance directives important? An advance directive helps you control your care. Although spoken wishes may be used, it is better to have your wishes written down. Spoken wishes can be misunderstood, or not followed. Treatments may be given even if you do not want them. An advance directive may make it easier for your family to make difficult choices about your care. Weight Management   Why it is important to manage your weight:  Being overweight increases your risk of health conditions such as heart disease, high blood pressure, type 2 diabetes, and certain types of cancer. It can also increase your risk for osteoarthritis, sleep apnea, and other respiratory problems. Aim for a slow, steady weight loss. Even a small amount of weight loss can lower your risk of health problems. How to lose weight safely:  A safe and healthy way to lose weight is to eat fewer calories and get regular exercise.  You can lose up about 1 pound a week by decreasing the number of calories you eat by 500 calories each day. Healthy meal plan for weight management:  A healthy meal plan includes a variety of foods, contains fewer calories, and helps you stay healthy. A healthy meal plan includes the following:  Eat whole-grain foods more often. A healthy meal plan should contain fiber. Fiber is the part of grains, fruits, and vegetables that is not broken down by your body. Whole-grain foods are healthy and provide extra fiber in your diet. Some examples of whole-grain foods are whole-wheat breads and pastas, oatmeal, brown rice, and bulgur. Eat a variety of vegetables every day. Include dark, leafy greens such as spinach, kale, khanh greens, and mustard greens. Eat yellow and orange vegetables such as carrots, sweet potatoes, and winter squash. Eat a variety of fruits every day. Choose fresh or canned fruit (canned in its own juice or light syrup) instead of juice. Fruit juice has very little or no fiber. Eat low-fat dairy foods. Drink fat-free (skim) milk or 1% milk. Eat fat-free yogurt and low-fat cottage cheese. Try low-fat cheeses such as mozzarella and other reduced-fat cheeses. Choose meat and other protein foods that are low in fat. Choose beans or other legumes such as split peas or lentils. Choose fish, skinless poultry (chicken or turkey), or lean cuts of red meat (beef or pork). Before you cook meat or poultry, cut off any visible fat. Use less fat and oil. Try baking foods instead of frying them. Add less fat, such as margarine, sour cream, regular salad dressing and mayonnaise to foods. Eat fewer high-fat foods. Some examples of high-fat foods include french fries, doughnuts, ice cream, and cakes. Eat fewer sweets. Limit foods and drinks that are high in sugar. This includes candy, cookies, regular soda, and sweetened drinks. Exercise:  Exercise at least 30 minutes per day on most days of the week.  Some examples of exercise include walking, biking, dancing, and swimming. You can also fit in more physical activity by taking the stairs instead of the elevator or parking farther away from stores. Ask your healthcare provider about the best exercise plan for you. © Copyright [employer] 2018 Information is for End User's use only and may not be sold, redistributed or otherwise used for commercial purposes.  All illustrations and images included in CareNotes® are the copyrighted property of [employer] Inc[employer] 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)       Genitourinary    Stage 3a chronic kidney disease ([address])       Other    Pure hypercholesterolemia    History of malignant neuroendocrine tumor    Adrenal nodule ([address])    Medicare annual wellness visit, subsequent - Primary     Lifestyle modification, diet and exercise discussed  Medications discussed and refilled appropriately  Labs discussed and ordered   Hepatitis C screening discussed  Depression screening performed  Anxiety screening performed  Urinary Incontinence screening performed   BMI discussed  Mammogram ordered  Fall screening performed         Relevant Orders    CBC and Platelet    Comprehensive metabolic panel    Anxiety about health    Vitamin D deficiency    Relevant Orders    Vitamin D 25 hydroxy    Class 1 obesity due to excess calories without serious comorbidity with body mass index (BMI) of 31.0 to 31.9 in adult    Abnormal CT of the abdomen     Newest CT scan on 10/16/2023         Screening for diabetes mellitus    Relevant Orders Hemoglobin A1C    Encounter for immunization    Relevant Orders    influenza vaccine, high-dose, PF 0.7 mL (FLUZONE HIGH-DOSE)    Screening for cardiovascular condition    Relevant Orders    Lipid panel    Need for hepatitis C screening test    Relevant Orders    Hepatitis C antibody    Encounter for screening mammogram for breast cancer    Relevant Orders    Mammo screening bilateral w 3d & cad

## 2023-10-16 ENCOUNTER — OFFICE VISIT (OUTPATIENT)
Dept: INTERNAL MEDICINE CLINIC | Facility: CLINIC | Age: 73
End: 2023-10-16
Payer: COMMERCIAL

## 2023-10-16 ENCOUNTER — HOSPITAL ENCOUNTER (OUTPATIENT)
Dept: CT IMAGING | Facility: HOSPITAL | Age: 73
Discharge: HOME | End: 2023-10-16
Attending: SURGERY
Payer: COMMERCIAL

## 2023-10-16 VITALS
BODY MASS INDEX: 32.07 KG/M2 | SYSTOLIC BLOOD PRESSURE: 188 MMHG | HEART RATE: 66 BPM | HEIGHT: 68 IN | OXYGEN SATURATION: 100 % | WEIGHT: 211.6 LBS | TEMPERATURE: 98.1 F | DIASTOLIC BLOOD PRESSURE: 106 MMHG

## 2023-10-16 DIAGNOSIS — C7A.00 CARCINOID TUMOR METASTATIC TO INTRA-ABDOMINAL LYMPH NODE (HCC): ICD-10-CM

## 2023-10-16 DIAGNOSIS — Z85.9 HISTORY OF MALIGNANT NEUROENDOCRINE TUMOR: ICD-10-CM

## 2023-10-16 DIAGNOSIS — Z23 ENCOUNTER FOR IMMUNIZATION: ICD-10-CM

## 2023-10-16 DIAGNOSIS — I10 ESSENTIAL HYPERTENSION: ICD-10-CM

## 2023-10-16 DIAGNOSIS — Z13.1 SCREENING FOR DIABETES MELLITUS: ICD-10-CM

## 2023-10-16 DIAGNOSIS — F41.8 ANXIETY ABOUT HEALTH: ICD-10-CM

## 2023-10-16 DIAGNOSIS — R93.5 ABNORMAL CT OF THE ABDOMEN: ICD-10-CM

## 2023-10-16 DIAGNOSIS — E55.9 VITAMIN D DEFICIENCY: ICD-10-CM

## 2023-10-16 DIAGNOSIS — Z13.6 SCREENING FOR CARDIOVASCULAR CONDITION: ICD-10-CM

## 2023-10-16 DIAGNOSIS — E66.09 CLASS 1 OBESITY DUE TO EXCESS CALORIES WITHOUT SERIOUS COMORBIDITY WITH BODY MASS INDEX (BMI) OF 31.0 TO 31.9 IN ADULT: ICD-10-CM

## 2023-10-16 DIAGNOSIS — Z12.31 ENCOUNTER FOR SCREENING MAMMOGRAM FOR BREAST CANCER: ICD-10-CM

## 2023-10-16 DIAGNOSIS — N18.31 STAGE 3A CHRONIC KIDNEY DISEASE (HCC): ICD-10-CM

## 2023-10-16 DIAGNOSIS — Z11.59 NEED FOR HEPATITIS C SCREENING TEST: ICD-10-CM

## 2023-10-16 DIAGNOSIS — K57.31 DIVERTICULOSIS OF COLON WITH HEMORRHAGE: ICD-10-CM

## 2023-10-16 DIAGNOSIS — C20 RECTAL CANCER (HCC): ICD-10-CM

## 2023-10-16 DIAGNOSIS — E27.8 ADRENAL NODULE (HCC): ICD-10-CM

## 2023-10-16 DIAGNOSIS — E78.00 PURE HYPERCHOLESTEROLEMIA: ICD-10-CM

## 2023-10-16 DIAGNOSIS — C7B.09 CARCINOID TUMOR METASTATIC TO INTRA-ABDOMINAL LYMPH NODE (HCC): ICD-10-CM

## 2023-10-16 DIAGNOSIS — Z00.00 MEDICARE ANNUAL WELLNESS VISIT, SUBSEQUENT: Primary | ICD-10-CM

## 2023-10-16 PROCEDURE — G1004 CDSM NDSC: HCPCS

## 2023-10-16 PROCEDURE — 99214 OFFICE O/P EST MOD 30 MIN: CPT | Performed by: NURSE PRACTITIONER

## 2023-10-16 PROCEDURE — 74177 CT ABD & PELVIS W/CONTRAST: CPT

## 2023-10-16 PROCEDURE — G0008 ADMIN INFLUENZA VIRUS VAC: HCPCS

## 2023-10-16 PROCEDURE — G0439 PPPS, SUBSEQ VISIT: HCPCS | Performed by: NURSE PRACTITIONER

## 2023-10-16 PROCEDURE — 90662 IIV NO PRSV INCREASED AG IM: CPT

## 2023-10-16 RX ADMIN — IOHEXOL 100 ML: 350 INJECTION, SOLUTION INTRAVENOUS at 06:57

## 2023-10-16 NOTE — PROGRESS NOTES
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)     Stable             Genitourinary    Stage 3a chronic kidney disease ([address])     Lab Results   Component Value Date    EGFR 55 10/03/2023    EGFR 55 03/28/2023    EGFR 50 12/13/2022    CREATININE 1.01 10/03/2023    CREATININE 1.02 03/28/2023    CREATININE 1.10 12/13/2022   Stable             Other    Pure hypercholesterolemia     Continue crestor          History of malignant neuroendocrine tumor     Stable          Adrenal nodule ([address] )     Stable          Medicare annual wellness visit, subsequent - Primary     Lifestyle modification, diet and exercise discussed  Medications discussed and refilled appropriately  Labs discussed and ordered   Hepatitis C screening discussed  Depression screening performed  Anxiety screening performed  Urinary Incontinence screening performed   BMI discussed  Mammogram ordered  Fall screening performed         Relevant Orders    CBC and Platelet    Comprehensive metabolic panel    Anxiety about health     Currently stable          Vitamin D deficiency     Continue supplementation          Relevant Orders    Vitamin D 25 hydroxy    Class 1 obesity due to excess calories without serious comorbidity with body mass index (BMI) of 31.0 to 31.9 in adult     Lifestyle modification, diet and exercise discussed          Abnormal CT of the abdomen     Newest CT scan on 10/16/2023         Screening for diabetes mellitus    Relevant Orders    Hemoglobin A1C    Encounter for immunization    Relevant Orders    influenza vaccine, high-dose, PF 0.7 mL (FLUZONE HIGH-DOSE) (Completed)    Screening for cardiovascular condition    Relevant Orders    Lipid panel    Need for hepatitis C screening test    Relevant Orders    Hepatitis C antibody    Encounter for screening mammogram for breast cancer    Relevant Orders    Mammo screening bilateral w 3d & cad     BMI Counseling: Body mass index is 32.17 kg/m². The BMI is above normal. Nutrition recommendations include decreasing portion sizes, encouraging healthy choices of fruits and vegetables, decreasing fast food intake, consuming healthier snacks, limiting drinks that contain sugar, moderation in carbohydrate intake, increasing intake of lean protein, reducing intake of saturated and trans fat and reducing intake of cholesterol. Exercise recommendations include exercising 3-5 times per week. No pharmacotherapy was ordered. Rationale for BMI follow-up plan is due to patient being overweight or obese. Depression Screening and Follow-up Plan: Patient was screened for depression during today's encounter. They screened negative with a PHQ-2 score of 0. Falls Plan of Care: Assessed feet and footwear. Assessed visual acuity. Home safety education provided. Urinary Incontinence Plan of Care: counseling topics discussed: practice Kegel (pelvic floor strengthening) exercises, use restroom every 2 hours and limiting fluid intake 3-4 hours before bed. Preventive health issues were discussed with patient, and age appropriate screening tests were ordered as noted in patient's After Visit Summary. Personalized health advice and appropriate referrals for health education or preventive services given if needed, as noted in patient's After Visit Summary. History of Present Illness:     Patient presents for a Medicare Wellness Visit    The patient is here today to discuss her Medicare Annual Wellness Visit.  Please continue to the [facility] section of the note for details of today's visit. Patient Care Team:  Ghada Ochoa as PCP - General (Internal Medicine)  Curtis Davila MD (Oncology)  Phineas Denver, MD (General Surgery)  Ever Rock MD (Nephrology)  Jessica Garcia as Nurse Practitioner (Nephrology)  Rafiq Katz MD (Gastroenterology)  Lizeth Franco PA-C as Physician Assistant (Physician Assistant)     Review of Systems:     Review of Systems   Constitutional:  Negative for activity change, chills, fatigue and fever. HENT:  Negative for rhinorrhea and sore throat. Eyes:  Negative for pain. Respiratory:  Negative for cough and shortness of breath. Cardiovascular:  Negative for chest pain, palpitations and leg swelling. Gastrointestinal:  Negative for abdominal pain, constipation, diarrhea, nausea and vomiting. Genitourinary:  Negative for difficulty urinating, flank pain, frequency and urgency. Musculoskeletal:  Negative for gait problem, joint swelling and myalgias. Skin:  Negative for color change. Neurological:  Negative for dizziness, weakness, light-headedness and headaches. Psychiatric/Behavioral:  Negative for sleep disturbance. The patient is nervous/anxious. All other systems reviewed and are negative.        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 ([address])    History of malignant neuroendocrine tumor    Adrenal nodule ([address])    Encounter for follow-up examination after completed treatment for malignant neoplasm    Thickened endometrium    Medicare annual wellness visit, subsequent    Anxiety about health    Vitamin D deficiency    Class 1 obesity due to excess calories without serious comorbidity with body mass index (BMI) of 31.0 to 31.9 in adult    Personal history of COVID-19    Arthritis of right hip    Arthritis of right knee    Diverticulosis of colon with hemorrhage    History of endometrial biopsy    Abnormal pelvic ultrasound    Abnormal CT of the abdomen    Iliopsoas bursitis of right hip    Splenic lesion    Hepatic cyst    Retrolisthesis of vertebrae    Screening for diabetes mellitus    Encounter for vaccination    History of colonoscopy    Seasonal allergies    Encounter for immunization    Screening for cardiovascular condition    Need for hepatitis C screening test    Encounter for screening mammogram for breast cancer      Past Medical and Surgical History:     Past Medical History:   Diagnosis Date    Colon polyps     Essential hypertension     Hematuria syndrome     Osteoarthritis     Proteinuria     Vitamin D deficiency      Past Surgical History:   Procedure Laterality Date    BREAST BIOPSY Right 2017    benign    COLONOSCOPY  12/2018    3 polyps     COLONOSCOPY      several    MAMMO STEREOTACTIC BREAST BIOPSY LEFT (ALL INC) Left 6/18/2020    MAMMO STEREOTACTIC BREAST BIOPSY RIGHT (ALL INC) Right 6/18/2020    IA LAPS ABD PRTM&OMENTUM DX W/WO SPEC BR/WA SPX N/A 3/24/2020    Procedure: 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;  Surgeon: Rayshawn Webb MD;  Location: [facility] MAIN OR;  Service: General    RECTAL BIOPSY N/A 2/9/2018    Procedure: TRANSANAL EXCISION RECTAL POLYP;MUCOSAL POLYPECTOMY;  Surgeon: Radha Diaz MD;  Location: [facility] MAIN OR;  Service: Colorectal    SMALL INTESTINE SURGERY N/A 4/29/2020    Procedure: RESECTION SMALL BOWEL;  Surgeon: Cliff Argueta MD;  Location: [facility] MAIN OR;  Service: Surgical Oncology      Family History:     Family History   Problem Relation Age of Onset    Hypertension Mother     Heart disease Father     No Known Problems Daughter     No Known Problems Maternal Grandmother     No Known Problems Maternal Grandfather     No Known Problems Paternal Grandmother     No Known Problems Paternal Grandfather     Pancreatic cancer Brother     No Known Problems Maternal Aunt     No Known Problems Paternal Aunt     No Known Problems Paternal Aunt       Social History:     Social History     Socioeconomic History    Marital status: /Civil Union     Spouse name: None    Number of children: None    Years of education: None    Highest education level: None   Occupational History    None   Tobacco Use    Smoking status: Former     Types: Cigarettes     Quit date:      Years since quittin.8    Smokeless tobacco: Never    Tobacco comments:     30  YEARS AGO    Vaping Use    Vaping Use: Never used   Substance and Sexual Activity    Alcohol use: Not Currently     Comment: occasional    Drug use: Never    Sexual activity: Not Currently   Other Topics Concern    None   Social History Narrative    None     Social Determinants of Health     Financial Resource Strain: Low Risk  (10/16/2023)    Overall Financial Resource Strain (CARDIA)     Difficulty of Paying Living Expenses: Not hard at all   Food Insecurity: Not on file   Transportation Needs: No Transportation Needs (10/16/2023)    PRAPARE - Transportation     Lack of Transportation (Medical): No     Lack of Transportation (Non-Medical):  No   Physical Activity: Not on file   Stress: Not on file   Social Connections: Not on file   Intimate Partner Violence: Not on file   Housing Stability: Not on file      Medications and Allergies:     Current Outpatient Medications   Medication Sig Dispense Refill    ALPRAZolam (XANAX) 0.25 mg tablet Take 1 tablet (0.25 mg total) by mouth daily at bedtime as needed for anxiety 60 tablet 0    Cholecalciferol (Vitamin D3) 50 MCG (2000 UT) capsule Take 1 capsule (2,000 Units total) by mouth every morning      co-enzyme Q-10 50 MG capsule Take 1 capsule (50 mg total) by mouth every other day      fluticasone (FLONASE) 50 mcg/act nasal spray 1 spray into each nostril daily 48 mL 1    lisinopril (ZESTRIL) 20 mg tablet TAKE ONE TABLET BY MOUTH TWICE A  tablet 3    metoprolol tartrate (LOPRESSOR) 100 mg tablet TAKE ONE TABLET BY MOUTH TWICE A  tablet 3    Multiple Vitamins-Minerals (CENTRUM PO) Take by mouth in the morning      rosuvastatin (CRESTOR) 20 MG tablet Take 1 tablet (20 mg total) by mouth every other day (Patient taking differently: Take 20 mg by mouth Take 1 tablet twice a week) 90 tablet 3    hydrALAZINE (APRESOLINE) 50 mg tablet Take 1 tablet (50 mg total) by mouth 3 (three) times a day 270 tablet 3     Current Facility-Administered Medications   Medication Dose Route Frequency Provider Last Rate Last Admin    cephalexin (KEFLEX) capsule 500 mg  500 mg Oral Q6H [address] Lili Cha MD         No Known Allergies   Immunizations:     Immunization History   Administered Date(s) Administered    COVID-19 J&J (Carmelo) vaccine 0.5 mL 04/06/2021    INFLUENZA 09/25/2015, 10/26/2017, 10/29/2018, 09/23/2021, 10/11/2022    Influenza, high dose seasonal 0.7 mL 10/21/2019, 10/11/2022, 10/16/2023    Pneumococcal Conjugate 13-Valent 05/10/2018    Pneumococcal Polysaccharide PPV23 10/11/2022      Health Maintenance:         Topic Date Due    Hepatitis C Screening  Never done    Breast Cancer Screening: Mammogram  05/11/2023    Colorectal Cancer Screening  02/18/2033         Topic Date Due    COVID-19 Vaccine (2 - Carmelo risk series) 05/04/2021      Medicare Screening Tests and Risk Assessments: Genet Verduzco is here for her Subsequent Wellness visit. Last Medicare Wellness visit information reviewed, patient interviewed and updates made to the record today. Health Risk Assessment:   Patient rates overall health as good. Patient feels that their physical health rating is same. Patient is very satisfied with their life. Eyesight was rated as same. Hearing was rated as same. Patient feels that their emotional and mental health rating is same.  Patients states they are never, rarely angry. Patient states they are sometimes unusually tired/fatigued. Pain experienced in the last 7 days has been some. Patient's pain rating has been 4/10. Patient states that she has experienced no weight loss or gain in last 6 months. Depression Screening:   PHQ-2 Score: 0  PHQ-9 Score: 0      Fall Risk Screening: In the past year, patient has experienced: no history of falling in past year      Urinary Incontinence Screening:   Patient has not leaked urine accidently in the last six months. Home Safety:  Patient does not have trouble with stairs inside or outside of their home. Patient has working smoke alarms and has working carbon monoxide detector. Home safety hazards include: none. Nutrition:   Current diet is Regular and Limited junk food. Medications:   Patient is not currently taking any over-the-counter supplements. Patient is able to manage medications. Activities of Daily Living (ADLs)/Instrumental Activities of Daily Living (IADLs):   Walk and transfer into and out of bed and chair?: Yes  Dress and groom yourself?: Yes    Bathe or shower yourself?: Yes    Feed yourself? Yes  Do your laundry/housekeeping?: Yes  Manage your money, pay your bills and track your expenses?: Yes  Make your own meals?: Yes    Do your own shopping?: Yes    Previous Hospitalizations:   Any hospitalizations or ED visits within the last 12 months?: No      Advance Care Planning:   Living will: Yes    Durable POA for healthcare: No    Advanced directive: Yes    Five wishes given: No      Comments: [facility]     Cognitive Screening:   Provider or family/friend/caregiver concerned regarding cognition?: No    PREVENTIVE SCREENINGS      Cardiovascular Screening:    General: Screening Not Indicated and History Lipid Disorder      Diabetes Screening:     General: Screening Current      Colorectal Cancer Screening:     General: Screening Current and History Colorectal Cancer Breast Cancer Screening:     General: Screening Current      Cervical Cancer Screening:    General: Screening Not Indicated      Lung Cancer Screening:     General: Screening Not Indicated    Screening, Brief Intervention, and Referral to Treatment (SBIRT)    Screening  Typical number of drinks in a day: 0  Typical number of drinks in a week: 0  Interpretation: Low risk drinking behavior. Single Item Drug Screening:  How often have you used an illegal drug (including marijuana) or a prescription medication for non-medical reasons in the past year? never    Single Item Drug Screen Score: 0  Interpretation: Negative screen for possible drug use disorder    Other Counseling Topics:   Car/seat belt/driving safety and calcium and vitamin D intake and regular weightbearing exercise. No results found. Physical Exam:     BP (!) 188/106   Pulse 66   Temp 98.1 °F (36.7 °C)   Ht 5' 8" (1.727 m)   Wt 96 kg (211 lb 9.6 oz)   SpO2 100%   BMI 32.17 kg/m²     Physical Exam  Vitals and nursing note reviewed. Constitutional:       General: She is awake. She is not in acute distress. Appearance: Normal appearance. She is well-developed and overweight. HENT:      Head: Normocephalic and atraumatic. Nose: Nose normal.      Mouth/Throat:      Mouth: Mucous membranes are moist.   Eyes:      Conjunctiva/sclera: Conjunctivae normal.   Cardiovascular:      Rate and Rhythm: Normal rate and regular rhythm. Pulses: Normal pulses. Heart sounds: Normal heart sounds. No murmur heard. Pulmonary:      Effort: Pulmonary effort is normal. No respiratory distress. Breath sounds: Normal breath sounds. Abdominal:      General: Bowel sounds are normal.      Palpations: Abdomen is soft. Tenderness: There is no abdominal tenderness. Musculoskeletal:      Cervical back: Neck supple. Right lower leg: No edema. Left lower leg: No edema. Skin:     General: Skin is warm and dry.    Neurological: Mental Status: She is alert and oriented to person, place, and time. Psychiatric:         Attention and Perception: Attention normal.         Mood and Affect: Mood is anxious. Speech: Speech normal.         Behavior: Behavior normal. Behavior is cooperative.           JAHAIRA Jeffrey

## 2023-10-16 NOTE — ASSESSMENT & PLAN NOTE
10/16/2023  Patient came from have a CT scan with contrast  BP elevated in the office: 188/106  She is compliant with her Lisinopril, Hydralazine

## 2023-10-19 DIAGNOSIS — I10 ESSENTIAL HYPERTENSION: ICD-10-CM

## 2023-10-19 RX ORDER — HYDRALAZINE HYDROCHLORIDE 50 MG/1
50 TABLET, FILM COATED ORAL 3 TIMES DAILY
Qty: 270 TABLET | Refills: 3 | Status: SHIPPED | OUTPATIENT
Start: 2023-10-19

## 2023-10-21 NOTE — ASSESSMENT & PLAN NOTE
Lab Results   Component Value Date    EGFR 55 10/03/2023    EGFR 55 03/28/2023    EGFR 50 12/13/2022    CREATININE 1.01 10/03/2023    CREATININE 1.02 03/28/2023    CREATININE 1.10 12/13/2022   Stable

## 2023-10-24 ENCOUNTER — OFFICE VISIT (OUTPATIENT)
Dept: SURGICAL ONCOLOGY | Facility: CLINIC | Age: 73
End: 2023-10-24
Payer: COMMERCIAL

## 2023-10-24 ENCOUNTER — TELEPHONE (OUTPATIENT)
Dept: HEMATOLOGY ONCOLOGY | Facility: CLINIC | Age: 73
End: 2023-10-24

## 2023-10-24 VITALS
DIASTOLIC BLOOD PRESSURE: 70 MMHG | BODY MASS INDEX: 32.1 KG/M2 | HEIGHT: 68 IN | TEMPERATURE: 98.8 F | SYSTOLIC BLOOD PRESSURE: 144 MMHG | WEIGHT: 211.8 LBS | HEART RATE: 71 BPM | OXYGEN SATURATION: 98 %

## 2023-10-24 DIAGNOSIS — C7A.00 CARCINOID TUMOR METASTATIC TO INTRA-ABDOMINAL LYMPH NODE (HCC): ICD-10-CM

## 2023-10-24 DIAGNOSIS — Z08 ENCOUNTER FOR FOLLOW-UP EXAMINATION AFTER COMPLETED TREATMENT FOR MALIGNANT NEOPLASM: Primary | ICD-10-CM

## 2023-10-24 DIAGNOSIS — Z85.9 HISTORY OF MALIGNANT NEUROENDOCRINE TUMOR: ICD-10-CM

## 2023-10-24 DIAGNOSIS — C7B.09 CARCINOID TUMOR METASTATIC TO INTRA-ABDOMINAL LYMPH NODE (HCC): ICD-10-CM

## 2023-10-24 PROCEDURE — 99213 OFFICE O/P EST LOW 20 MIN: CPT

## 2023-10-24 NOTE — PROGRESS NOTES
Surgical Oncology Follow Up       [address] [facility] [place]  [address]  [facility] [address] [phone]    Raghu Mayer  1950  [id number]  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)        Chief Complaint   Patient presents with    Follow-up       Return in about 6 months (around 4/24/2024) for Office Visit, Imaging - See orders, Labs - See Treatment Plan. Oncology History   History of malignant neuroendocrine tumor   3/24/2020 Initial Diagnosis    Primary malignant neuroendocrine tumor of small intestine ([address])     4/29/2020 Surgery    Small bowel and mesenteric mass, resection:       - Well-differentiated neuroendocrine tumor, 2.4 cm, G2.       - Lymph-vascular and perineural invasion are identified. - 4 of 13 lymph nodes positive for metastatic well-differentiated neuroendocrine tumor (4/13). - Largest lymph node deposit: 1.5 cm with extra-emigdio extension, (mesenteric mass). - 3 mitotic figures per 2 mm2, (consistent with grade G2). - The tumor is extremely close to the serosal surface (approximately . 0003 cm from serosal surface). Radiation    The patient saw @[facility] for radiation treatment. This is the current list of radiation treatment:  Radiation Treatments       No radiation treatments to show.  (Treatments may have been administered in another system.)              Chemotherapy    [No treatment plan]     4/29/2020 -  Cancer Staged    Staging form: Neuroendocrine Tumor - Jejunum/Ileum, AJCC 8th Edition  - Pathologic stage from 4/29/2020: Stage III (pT3, pN1, cM0) - Signed by JAHAIRA Ortiz on 10/5/2023  Mitotic count: 3  Histologic grade (G): G2  Histologic grading system: 3 grade system  Lymph-vascular invasion (LVI): LVI present/identified, NOS  Mitotic unit: 10 HPF  Solitary (s) or multifocal (m) tumors in the primary site: Solitary  Clinical status of regional lymph nodes: Metastases confirmed by pathologic examination  Total positive nodes: 4           Staging: [id number] well-differentiated neuroendocrine tumor of the ileum, April 2020  Treatment history:  Small-bowel resection with mesenteric nodes, April 2020  Current treatment:  Observation. Disease status: KAREN. Stable benign adrenal nodule. Stable/decreasing right psoas mass. History of Present Illness: The patient returns to the office today in follow-up for her history of a locally metastatic neuroendocrine tumor of the small bowel. She is currently KAREN at 3 1/2 years and doing well. She reports her appetite is good, and she denies any abdominal pain, bowel changes or weight loss. She denies any SOB or cough. A chromogranin A level has been drawn, and CT of the abdomen and pelvis was performed on October 16. I have reviewed these results myself and discussed them with the patient today. Review of Systems   Constitutional:  Negative for activity change, appetite change, fatigue and unexpected weight change. HENT: Negative. Respiratory: Negative. Negative for cough and shortness of breath. Cardiovascular: Negative. Gastrointestinal: Negative. Negative for abdominal distention, abdominal pain, diarrhea, nausea and vomiting. Musculoskeletal: Negative. Skin: Negative. Negative for color change. Neurological: Negative. Negative for dizziness and headaches. Hematological: Negative. Negative for adenopathy.    Psychiatric/Behavioral: Negative.                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 ([address])    History of malignant neuroendocrine tumor    Adrenal nodule ([address])    Encounter for follow-up examination after completed treatment for malignant neoplasm    Thickened endometrium    Medicare annual wellness visit, subsequent    Anxiety about health    Vitamin D deficiency    Class 1 obesity due to excess calories without serious comorbidity with body mass index (BMI) of 31.0 to 31.9 in adult    Personal history of COVID-19    Arthritis of right hip    Arthritis of right knee    Diverticulosis of colon with hemorrhage    History of endometrial biopsy    Abnormal pelvic ultrasound    Abnormal CT of the abdomen    Iliopsoas bursitis of right hip    Splenic lesion    Hepatic cyst    Retrolisthesis of vertebrae    Screening for diabetes mellitus    Encounter for vaccination    History of colonoscopy    Seasonal allergies    Encounter for immunization    Screening for cardiovascular condition    Need for hepatitis C screening test    Encounter for screening mammogram for breast cancer     Past Medical History:   Diagnosis Date    Colon polyps     Essential hypertension     Hematuria syndrome     Osteoarthritis     Proteinuria     Vitamin D deficiency      Past Surgical History:   Procedure Laterality Date    BREAST BIOPSY Right 2017    benign    COLONOSCOPY  12/2018    3 polyps     COLONOSCOPY      several    MAMMO STEREOTACTIC BREAST BIOPSY LEFT (ALL INC) Left 6/18/2020    MAMMO STEREOTACTIC BREAST BIOPSY RIGHT (ALL INC) Right 6/18/2020    ME LAPS ABD PRTM&OMENTUM DX W/WO SPEC BR/WA SPX N/A 3/24/2020    Procedure: 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;  Surgeon: Hattie Portillo MD; Location: [address] [place] [address] MAIN OR;  Service: General    RECTAL BIOPSY N/A 2018    Procedure: TRANSANAL EXCISION RECTAL POLYP;MUCOSAL POLYPECTOMY;  Surgeon: Shefali Loving MD;  Location: [facility] MAIN OR;  Service: Colorectal    SMALL INTESTINE SURGERY N/A 2020    Procedure: RESECTION SMALL BOWEL;  Surgeon: Isaías Kothari MD;  Location: [facility] MAIN OR;  Service: Surgical Oncology     Family History   Problem Relation Age of Onset    Hypertension Mother     Heart disease Father     No Known Problems Daughter     No Known Problems Maternal Grandmother     No Known Problems Maternal Grandfather     No Known Problems Paternal Grandmother     No Known Problems Paternal Grandfather     Pancreatic cancer Brother     No Known Problems Maternal Aunt     No Known Problems Paternal Aunt     No Known Problems Paternal Aunt      Social History     Socioeconomic History    Marital status: /Civil Union     Spouse name: Not on file    Number of children: Not on file    Years of education: Not on file    Highest education level: Not on file   Occupational History    Not on file   Tobacco Use    Smoking status: Former     Types: Cigarettes     Quit date:      Years since quittin.8    Smokeless tobacco: Never    Tobacco comments:     30  YEARS AGO    Vaping Use    Vaping Use: Never used   Substance and Sexual Activity    Alcohol use: Not Currently     Comment: occasional    Drug use: Never    Sexual activity: Not Currently   Other Topics Concern    Not on file   Social History Narrative    Not on file     Social Determinants of Health     Financial Resource Strain: Low Risk  (10/16/2023)    Overall Financial Resource Strain (CARDIA)     Difficulty of Paying Living Expenses: Not hard at all   Food Insecurity: Not on file   Transportation Needs: No Transportation Needs (10/16/2023)    PRAPARE - Transportation     Lack of Transportation (Medical): No     Lack of Transportation (Non-Medical):  No   Physical Activity: Not on file Stress: Not on file   Social Connections: Not on file   Intimate Partner Violence: Not on file   Housing Stability: Not on file       Current Outpatient Medications:     ALPRAZolam (XANAX) 0.25 mg tablet, Take 1 tablet (0.25 mg total) by mouth daily at bedtime as needed for anxiety, Disp: 60 tablet, Rfl: 0    Cholecalciferol (Vitamin D3) 50 MCG (2000 UT) capsule, Take 1 capsule (2,000 Units total) by mouth every morning, Disp: , Rfl:     co-enzyme Q-10 50 MG capsule, Take 1 capsule (50 mg total) by mouth every other day, Disp: , Rfl:     fluticasone (FLONASE) 50 mcg/act nasal spray, 1 spray into each nostril daily, Disp: 48 mL, Rfl: 1    hydrALAZINE (APRESOLINE) 50 mg tablet, Take 1 tablet (50 mg total) by mouth 3 (three) times a day, Disp: 270 tablet, Rfl: 3    lisinopril (ZESTRIL) 20 mg tablet, TAKE ONE TABLET BY MOUTH TWICE A DAY, Disp: 180 tablet, Rfl: 3    metoprolol tartrate (LOPRESSOR) 100 mg tablet, TAKE ONE TABLET BY MOUTH TWICE A DAY, Disp: 180 tablet, Rfl: 3    Multiple Vitamins-Minerals (CENTRUM PO), Take by mouth in the morning, Disp: , Rfl:     rosuvastatin (CRESTOR) 20 MG tablet, Take 1 tablet (20 mg total) by mouth every other day (Patient taking differently: Take 20 mg by mouth Take 1 tablet twice a week), Disp: 90 tablet, Rfl: 3    Current Facility-Administered Medications:     cephalexin (KEFLEX) capsule 500 mg, 500 mg, Oral, Q6H [address] [facility], Sheng Latham MD  No Known Allergies  Vitals:    10/24/23 0756   BP: 144/70   Pulse: 71   Temp: 98.8 °F (37.1 °C)   SpO2: 98%       Physical Exam  Vitals reviewed. Constitutional:       General: She is not in acute distress. Appearance: Normal appearance. She is not ill-appearing or toxic-appearing. HENT:      Head: Normocephalic and atraumatic. Nose: Nose normal.      Mouth/Throat:      Mouth: Mucous membranes are moist.   Eyes:      General: No scleral icterus.      Conjunctiva/sclera: Conjunctivae normal.   Cardiovascular:      Rate and Rhythm: Normal rate. Pulmonary:      Effort: Pulmonary effort is normal.   Abdominal:      General: Abdomen is flat. There is no distension. Palpations: Abdomen is soft. There is no mass. Tenderness: There is no abdominal tenderness. Musculoskeletal:         General: Normal range of motion. Cervical back: Normal range of motion and neck supple. Skin:     General: Skin is warm and dry. Neurological:      General: No focal deficit present. Mental Status: She is alert and oriented to person, place, and time. Psychiatric:         Mood and Affect: Mood normal.         Behavior: Behavior normal.         Thought Content: Thought content normal.         Judgment: Judgment normal.           Labs:  Chromogranin A  Collected 10/3/2023         Component Ref Range & Units    Chromogranin A 0.0 - 101.8 ng/mL 72.3          Imaging  CT abdomen pelvis w contrast    Result Date: 10/22/2023  Narrative: CT ABDOMEN AND PELVIS WITH IV CONTRAST INDICATION:   Z85.9: Personal history of malignant neoplasm, unspecified. COMPARISON: 4/10/2023, 12/20/2022, 6/17/2022, 12/9/2021 and 5/25/2021 TECHNIQUE:  CT examination of the abdomen and pelvis was performed. Multiplanar 2D reformatted images were created from the source data. This examination, like all CT scans performed in the [facility], was performed utilizing techniques to minimize radiation dose exposure, including the use of iterative reconstruction and automated exposure control. Radiation dose length product (DLP) for this visit:  914.17 mGy-cm IV Contrast:  100 mL of iohexol (OMNIPAQUE) Enteric Contrast: Enteric contrast was administered. FINDINGS: ABDOMEN LOWER CHEST:  No clinically significant abnormality identified in the visualized lower chest. LIVER/BILIARY TREE: Enlarged fatty liver noted. GALLBLADDER:  No calcified gallstones. No pericholecystic inflammatory change.  SPLEEN: Anterior region of hypoattenuation on 2/36 has also been stable over time. PANCREAS:  Unremarkable. ADRENAL GLANDS: Stable right adrenal nodule. Given stability over time, this again likely reflects an adenoma. Scattered calcifications left adrenal gland as before. KIDNEYS/URETERS:  Unremarkable. No hydronephrosis. STOMACH AND BOWEL: Small bowel staple line on 2/96 with minimal adjacent nodularity, unchanged. APPENDIX:  No findings to suggest appendicitis. ABDOMINOPELVIC CAVITY:  No ascites. No pneumoperitoneum. No lymphadenopathy. VESSELS:  Unremarkable for patient's age. PELVIS REPRODUCTIVE ORGANS:  Unremarkable for patient's age. URINARY BLADDER:  Unremarkable. ABDOMINAL WALL/INGUINAL REGIONS:  Unremarkable. OSSEOUS STRUCTURES:  No acute fracture or destructive osseous lesion. Impression: Stable postoperative and other benign findings as above. Overall stable exam. Workstation performed: [id number]     I personally reviewed and interpreted the above laboratory and imaging data. Discussion/Summary: This is a 67 y/o female who presents today for continued NET surveillance. She is doing well at this time, and there is no evidence of disease on imaging. Her tumor marker is within normal range. Since it has been almost 2 years since her last chest imaging, I have recommended including that with her CT in 6 months since she did have stage III disease. She is agreeable to the plan, all questions have been answered.

## 2023-12-14 PROBLEM — Z00.00 MEDICARE ANNUAL WELLNESS VISIT, SUBSEQUENT: Status: RESOLVED | Noted: 2021-08-10 | Resolved: 2023-12-14

## 2023-12-14 PROBLEM — Z13.1 SCREENING FOR DIABETES MELLITUS: Status: RESOLVED | Noted: 2022-10-11 | Resolved: 2023-12-14

## 2023-12-15 PROBLEM — Z11.59 NEED FOR HEPATITIS C SCREENING TEST: Status: RESOLVED | Noted: 2023-10-16 | Resolved: 2023-12-15

## 2023-12-15 PROBLEM — Z13.6 SCREENING FOR CARDIOVASCULAR CONDITION: Status: RESOLVED | Noted: 2023-10-16 | Resolved: 2023-12-15

## 2023-12-28 DIAGNOSIS — U07.1 COVID-19 VIRUS DETECTED: ICD-10-CM

## 2023-12-28 DIAGNOSIS — Z86.16 PERSONAL HISTORY OF COVID-19: Primary | ICD-10-CM

## 2023-12-28 RX ORDER — NIRMATRELVIR AND RITONAVIR 150-100 MG
2 KIT ORAL 2 TIMES DAILY
Qty: 20 TABLET | Refills: 0 | Status: SHIPPED | OUTPATIENT
Start: 2023-12-28 | End: 2024-01-02

## 2024-02-01 ENCOUNTER — HOSPITAL ENCOUNTER (OUTPATIENT)
Dept: MAMMOGRAPHY | Facility: HOSPITAL | Age: 74
End: 2024-02-01
Payer: COMMERCIAL

## 2024-02-01 VITALS — BODY MASS INDEX: 31.98 KG/M2 | WEIGHT: 211 LBS | HEIGHT: 68 IN

## 2024-02-01 DIAGNOSIS — Z12.31 ENCOUNTER FOR SCREENING MAMMOGRAM FOR BREAST CANCER: ICD-10-CM

## 2024-02-01 PROCEDURE — 77067 SCR MAMMO BI INCL CAD: CPT

## 2024-02-01 PROCEDURE — 77063 BREAST TOMOSYNTHESIS BI: CPT

## 2024-02-09 ENCOUNTER — APPOINTMENT (OUTPATIENT)
Dept: LAB | Facility: CLINIC | Age: 74
End: 2024-02-09
Payer: COMMERCIAL

## 2024-02-09 DIAGNOSIS — Z11.59 NEED FOR HEPATITIS C SCREENING TEST: ICD-10-CM

## 2024-02-09 DIAGNOSIS — E55.9 VITAMIN D DEFICIENCY: ICD-10-CM

## 2024-02-09 DIAGNOSIS — Z13.1 SCREENING FOR DIABETES MELLITUS: ICD-10-CM

## 2024-02-09 DIAGNOSIS — Z13.6 SCREENING FOR CARDIOVASCULAR CONDITION: ICD-10-CM

## 2024-02-09 DIAGNOSIS — Z00.00 MEDICARE ANNUAL WELLNESS VISIT, SUBSEQUENT: ICD-10-CM

## 2024-02-09 LAB
25(OH)D3 SERPL-MCNC: 58.2 NG/ML (ref 30–100)
ALBUMIN SERPL BCP-MCNC: 4.2 G/DL (ref 3.5–5)
ALP SERPL-CCNC: 51 U/L (ref 34–104)
ALT SERPL W P-5'-P-CCNC: 12 U/L (ref 7–52)
ANION GAP SERPL CALCULATED.3IONS-SCNC: 7 MMOL/L
AST SERPL W P-5'-P-CCNC: 18 U/L (ref 13–39)
BILIRUB SERPL-MCNC: 0.59 MG/DL (ref 0.2–1)
BUN SERPL-MCNC: 14 MG/DL (ref 5–25)
CALCIUM SERPL-MCNC: 9.3 MG/DL (ref 8.4–10.2)
CHLORIDE SERPL-SCNC: 100 MMOL/L (ref 96–108)
CHOLEST SERPL-MCNC: 143 MG/DL
CO2 SERPL-SCNC: 28 MMOL/L (ref 21–32)
CREAT SERPL-MCNC: 1.02 MG/DL (ref 0.6–1.3)
ERYTHROCYTE [DISTWIDTH] IN BLOOD BY AUTOMATED COUNT: 13.2 % (ref 11.6–15.1)
EST. AVERAGE GLUCOSE BLD GHB EST-MCNC: 114 MG/DL
GFR SERPL CREATININE-BSD FRML MDRD: 55 ML/MIN/1.73SQ M
GLUCOSE P FAST SERPL-MCNC: 88 MG/DL (ref 65–99)
HBA1C MFR BLD: 5.6 %
HCT VFR BLD AUTO: 36.4 % (ref 34.8–46.1)
HDLC SERPL-MCNC: 48 MG/DL
HGB BLD-MCNC: 12 G/DL (ref 11.5–15.4)
LDLC SERPL CALC-MCNC: 73 MG/DL (ref 0–100)
MCH RBC QN AUTO: 29.6 PG (ref 26.8–34.3)
MCHC RBC AUTO-ENTMCNC: 33 G/DL (ref 31.4–37.4)
MCV RBC AUTO: 90 FL (ref 82–98)
NONHDLC SERPL-MCNC: 95 MG/DL
PLATELET # BLD AUTO: 378 THOUSANDS/UL (ref 149–390)
PMV BLD AUTO: 9.3 FL (ref 8.9–12.7)
POTASSIUM SERPL-SCNC: 4.6 MMOL/L (ref 3.5–5.3)
PROT SERPL-MCNC: 7 G/DL (ref 6.4–8.4)
RBC # BLD AUTO: 4.05 MILLION/UL (ref 3.81–5.12)
SODIUM SERPL-SCNC: 135 MMOL/L (ref 135–147)
TRIGL SERPL-MCNC: 112 MG/DL
WBC # BLD AUTO: 7.15 THOUSAND/UL (ref 4.31–10.16)

## 2024-02-09 PROCEDURE — 85027 COMPLETE CBC AUTOMATED: CPT

## 2024-02-09 PROCEDURE — 83036 HEMOGLOBIN GLYCOSYLATED A1C: CPT

## 2024-02-09 PROCEDURE — 36415 COLL VENOUS BLD VENIPUNCTURE: CPT

## 2024-02-09 PROCEDURE — 86803 HEPATITIS C AB TEST: CPT

## 2024-02-09 PROCEDURE — 82306 VITAMIN D 25 HYDROXY: CPT

## 2024-02-09 PROCEDURE — 80061 LIPID PANEL: CPT

## 2024-02-09 PROCEDURE — 80053 COMPREHEN METABOLIC PANEL: CPT

## 2024-02-10 LAB — HCV AB SER QL: NORMAL

## 2024-02-25 DIAGNOSIS — J32.9 SINUSITIS, UNSPECIFIED CHRONICITY, UNSPECIFIED LOCATION: ICD-10-CM

## 2024-02-26 RX ORDER — FLUTICASONE PROPIONATE 50 MCG
SPRAY, SUSPENSION (ML) NASAL
Qty: 48 G | Refills: 1 | Status: SHIPPED | OUTPATIENT
Start: 2024-02-26

## 2024-03-08 DIAGNOSIS — I10 ESSENTIAL HYPERTENSION: ICD-10-CM

## 2024-03-08 RX ORDER — LISINOPRIL 20 MG/1
20 TABLET ORAL 2 TIMES DAILY
Qty: 180 TABLET | Refills: 3 | Status: SHIPPED | OUTPATIENT
Start: 2024-03-08

## 2024-04-03 ENCOUNTER — RA CDI HCC (OUTPATIENT)
Dept: OTHER | Facility: HOSPITAL | Age: 74
End: 2024-04-03

## 2024-04-09 ENCOUNTER — RA CDI HCC (OUTPATIENT)
Dept: OTHER | Facility: HOSPITAL | Age: 74
End: 2024-04-09

## 2024-04-09 PROBLEM — C20 RECTAL CANCER (HCC): Status: RESOLVED | Noted: 2019-07-03 | Resolved: 2024-04-09

## 2024-04-09 PROBLEM — Z85.048 PERSONAL HISTORY OF RECTAL CANCER: Status: ACTIVE | Noted: 2024-04-09

## 2024-04-09 PROBLEM — U07.1 COVID-19 VIRUS DETECTED: Status: RESOLVED | Noted: 2023-12-28 | Resolved: 2024-04-09

## 2024-04-09 PROBLEM — I12.9 HYPERTENSIVE KIDNEY DISEASE WITH CHRONIC KIDNEY DISEASE: Status: ACTIVE | Noted: 2024-04-09

## 2024-04-09 PROBLEM — C7A.00 CARCINOID TUMOR METASTATIC TO INTRA-ABDOMINAL LYMPH NODE (HCC): Status: RESOLVED | Noted: 2020-03-11 | Resolved: 2024-04-09

## 2024-04-09 PROBLEM — C7B.09 CARCINOID TUMOR METASTATIC TO INTRA-ABDOMINAL LYMPH NODE (HCC): Status: RESOLVED | Noted: 2020-03-11 | Resolved: 2024-04-09

## 2024-04-10 ENCOUNTER — APPOINTMENT (OUTPATIENT)
Dept: LAB | Facility: CLINIC | Age: 74
End: 2024-04-10
Payer: COMMERCIAL

## 2024-04-10 DIAGNOSIS — Z85.9 HISTORY OF MALIGNANT NEUROENDOCRINE TUMOR: ICD-10-CM

## 2024-04-10 LAB
BUN SERPL-MCNC: 15 MG/DL (ref 5–25)
CREAT SERPL-MCNC: 0.94 MG/DL (ref 0.6–1.3)
GFR SERPL CREATININE-BSD FRML MDRD: 60 ML/MIN/1.73SQ M

## 2024-04-10 PROCEDURE — 82565 ASSAY OF CREATININE: CPT

## 2024-04-10 PROCEDURE — 84520 ASSAY OF UREA NITROGEN: CPT

## 2024-04-10 PROCEDURE — 86316 IMMUNOASSAY TUMOR OTHER: CPT

## 2024-04-10 PROCEDURE — 36415 COLL VENOUS BLD VENIPUNCTURE: CPT

## 2024-04-12 LAB — CGA SERPL-MCNC: 89.8 NG/ML (ref 0–101.8)

## 2024-04-16 ENCOUNTER — OFFICE VISIT (OUTPATIENT)
Dept: INTERNAL MEDICINE CLINIC | Facility: CLINIC | Age: 74
End: 2024-04-16
Payer: COMMERCIAL

## 2024-04-16 VITALS
OXYGEN SATURATION: 99 % | BODY MASS INDEX: 30.84 KG/M2 | HEART RATE: 72 BPM | HEIGHT: 68 IN | TEMPERATURE: 98.5 F | WEIGHT: 203.5 LBS | DIASTOLIC BLOOD PRESSURE: 73 MMHG | SYSTOLIC BLOOD PRESSURE: 144 MMHG

## 2024-04-16 DIAGNOSIS — Z13.1 SCREENING FOR DIABETES MELLITUS: ICD-10-CM

## 2024-04-16 DIAGNOSIS — R45.89 ANXIETY ABOUT HEALTH: ICD-10-CM

## 2024-04-16 DIAGNOSIS — R79.9 ABNORMAL FINDING OF BLOOD CHEMISTRY, UNSPECIFIED: ICD-10-CM

## 2024-04-16 DIAGNOSIS — E78.00 PURE HYPERCHOLESTEROLEMIA: ICD-10-CM

## 2024-04-16 DIAGNOSIS — I10 ESSENTIAL HYPERTENSION: Primary | ICD-10-CM

## 2024-04-16 DIAGNOSIS — N18.31 STAGE 3A CHRONIC KIDNEY DISEASE (HCC): ICD-10-CM

## 2024-04-16 DIAGNOSIS — E55.9 VITAMIN D DEFICIENCY: ICD-10-CM

## 2024-04-16 DIAGNOSIS — E27.8 ADRENAL NODULE (HCC): ICD-10-CM

## 2024-04-16 DIAGNOSIS — E66.09 CLASS 1 OBESITY DUE TO EXCESS CALORIES WITHOUT SERIOUS COMORBIDITY WITH BODY MASS INDEX (BMI) OF 31.0 TO 31.9 IN ADULT: ICD-10-CM

## 2024-04-16 PROCEDURE — G2211 COMPLEX E/M VISIT ADD ON: HCPCS | Performed by: NURSE PRACTITIONER

## 2024-04-16 PROCEDURE — 99214 OFFICE O/P EST MOD 30 MIN: CPT | Performed by: NURSE PRACTITIONER

## 2024-04-16 RX ORDER — ROSUVASTATIN CALCIUM 20 MG/1
20 TABLET, COATED ORAL 2 TIMES WEEKLY
Qty: 90 TABLET | Refills: 0 | Status: SHIPPED | OUTPATIENT
Start: 2024-04-18 | End: 2024-04-16 | Stop reason: SDUPTHER

## 2024-04-16 RX ORDER — ROSUVASTATIN CALCIUM 20 MG/1
20 TABLET, COATED ORAL 2 TIMES WEEKLY
Qty: 51 TABLET | Refills: 1 | Status: SHIPPED | OUTPATIENT
Start: 2024-04-18

## 2024-04-16 NOTE — PROGRESS NOTES
Name: Radha Stephen      : 1950      MRN: [id number]  Encounter Provider: JAHAIRA Branham  Encounter Date: 2024   Encounter department: [facility]    Assessment & Plan     1. 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 Pure hypercholesterolemia  -     Lipid panel; Future; Expected date: 10/07/2024  -     rosuvastatin (CRESTOR) 20 MG tablet; Take 1 tablet (20 mg total) by mouth 2 (two) times a week Take 1 tablet twice a week    3. Stage 3a chronic kidney disease (HCC)  Assessment & Plan:  Lab Results   Component Value Date    EGFR 60 04/10/2024    EGFR 55 2024    EGFR 55 10/03/2023    CREATININE 0.94 04/10/2024    CREATININE 1.02 2024    CREATININE 1.01 10/03/2023   Stable     Orders:  -     CBC and Platelet; Future; Expected date: 10/07/2024  -     Comprehensive metabolic panel; Future; Expected date: 10/07/2024    4. Vitamin D deficiency  -     Vitamin D 25 hydroxy; Future; Expected date: 10/07/2024    5. Anxiety about health  Assessment & Plan:  Was on Xanax in the past      6. Class 1 obesity due to excess calories without serious comorbidity with body mass index (BMI) of 31.0 to 31.9 in adult  Assessment & Plan:  Lifestyle modification, diet and exercise discussed       7. Screening for diabetes mellitus  -     Hemoglobin A1C; Future; Expected date: 10/07/2024    8. Adrenal nodule (HCC)    9. Abnormal finding of blood chemistry, unspecified  -     Hemoglobin A1C; Future; Expected date: 10/07/2024        Depression Screening and Follow-up Plan: Patient was screened for depression during today's encounter. They screened negative with a PHQ-2 score of 0.    Falls Plan of Care: Assessed feet and footwear. Home safety education provided.     Urinary  Incontinence Plan of Care: counseling topics discussed: practice Kegel (pelvic floor strengthening) exercises, use restroom every 2 hours, limiting fluid intake 3-4 hours before bed and preventing constipation.         Subjective      The patient is here today to discuss her medications and treatment plans. Please continue to the PORCH section of the note for details of today's visit.        Review of Systems   Constitutional:  Negative for activity change, chills, fatigue and fever.   HENT:  Negative for rhinorrhea and sore throat.    Eyes:  Negative for pain.   Respiratory:  Negative for cough and shortness of breath.    Cardiovascular:  Negative for chest pain, palpitations and leg swelling.   Gastrointestinal:  Negative for abdominal pain, constipation, diarrhea, nausea and vomiting.   Genitourinary:  Negative for difficulty urinating, flank pain, frequency and urgency.   Musculoskeletal:  Negative for gait problem, joint swelling and myalgias.   Skin:  Negative for color change.   Neurological:  Negative for dizziness, weakness, light-headedness and headaches.   Psychiatric/Behavioral:  Negative for sleep disturbance. The patient is not nervous/anxious.    All other systems reviewed and are negative.      Current Outpatient Medications on File Prior to Visit   Medication Sig    Cholecalciferol (Vitamin D3) 50 MCG (2000 UT) capsule Take 1 capsule (2,000 Units total) by mouth every morning    co-enzyme Q-10 50 MG capsule Take 1 capsule (50 mg total) by mouth every other day    fluticasone (FLONASE) 50 mcg/act nasal spray administer 1 spray into each nostril daily (Patient taking differently: 2 sprays into each nostril as needed for rhinitis or allergies)    hydrALAZINE (APRESOLINE) 50 mg tablet Take 1 tablet (50 mg total) by mouth 3 (three) times a day    lisinopril (ZESTRIL) 20 mg tablet Take 1 tablet (20 mg total) by mouth 2 (two) times a day    metoprolol tartrate (LOPRESSOR) 100 mg tablet TAKE ONE TABLET BY  "MOUTH TWICE A DAY    Multiple Vitamins-Minerals (CENTRUM PO) Take by mouth in the morning    [DISCONTINUED] ALPRAZolam (XANAX) 0.25 mg tablet Take 1 tablet (0.25 mg total) by mouth daily at bedtime as needed for anxiety    [DISCONTINUED] rosuvastatin (CRESTOR) 20 MG tablet Take 1 tablet (20 mg total) by mouth every other day (Patient taking differently: Take 20 mg by mouth Take 1 tablet twice a week)       Objective     /73 Comment: at home, left arm  Pulse 72   Temp 98.5 °F (36.9 °C)   Ht 5' 8\" (1.727 m)   Wt 92.3 kg (203 lb 8 oz)   SpO2 99%   BMI 30.94 kg/m²     Physical Exam  Vitals reviewed.   Constitutional:       General: She is awake.      Appearance: Normal appearance. She is well-developed and normal weight.   HENT:      Head: Normocephalic and atraumatic.      Nose: Nose normal.      Mouth/Throat:      Mouth: Mucous membranes are moist.   Eyes:      Extraocular Movements: Extraocular movements intact.   Cardiovascular:      Rate and Rhythm: Normal rate and regular rhythm.      Pulses: Normal pulses.      Heart sounds: Normal heart sounds.   Pulmonary:      Effort: Pulmonary effort is normal.      Breath sounds: Normal breath sounds.   Abdominal:      General: Bowel sounds are normal.      Palpations: Abdomen is soft.   Musculoskeletal:         General: Normal range of motion.      Cervical back: Normal range of motion.      Right lower leg: No edema.      Left lower leg: No edema.   Skin:     General: Skin is warm and dry.   Neurological:      Mental Status: She is alert and oriented to person, place, and time.   Psychiatric:         Attention and Perception: Attention normal.         Mood and Affect: Mood normal.         Speech: Speech normal.         Behavior: Behavior normal. Behavior is cooperative.       JAHAIRA Branham    "

## 2024-04-16 NOTE — ASSESSMENT & PLAN NOTE
Lab Results   Component Value Date    EGFR 60 04/10/2024    EGFR 55 02/09/2024    EGFR 55 10/03/2023    CREATININE 0.94 04/10/2024    CREATININE 1.02 02/09/2024    CREATININE 1.01 10/03/2023   Stable

## 2024-04-16 NOTE — ASSESSMENT & PLAN NOTE
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

## 2024-04-16 NOTE — ASSESSMENT & PLAN NOTE
Lab Results   Component Value Date    EGFR 60 04/10/2024    EGFR 55 02/09/2024    EGFR 55 10/03/2023    CREATININE 0.94 04/10/2024    CREATININE 1.02 02/09/2024    CREATININE 1.01 10/03/2023

## 2024-04-16 NOTE — PATIENT INSTRUCTIONS
YOU ARE DUE FOR YOUR MEDICARE ANNUAL WELLNESS EXAM AFTER 10/16/2024.    REPEAT LABS ORDERED, PLEASE HAVE THEM DRAWN THE WEEK PRIOR TO YOUR VISIT (APPROXIMATELY 10/7/2024).    LABS HAVE BEEN ORDERED FOR YOU.   THESE LABS SHOULD BE DRAWN PRIOR TO YOUR NEXT VISIT.  YOU CAN HAVE THEM DRAWN UP TO 2 WEEKS PRIOR TO YOUR NEXT VISIT.  HAVING YOUR BLOOD WORK WHEN YOU COME TO YOUR NEXT VISIT WILL ALLOW ME TO PROVIDE THE BEST MEDICAL CARE FOR YOU WITHOUT HAVING EITHER OF US PERFORM MORE WORK THAN NECESSARY.  PLEASE BE COMPLIANT WITH THIS REQUEST.

## 2024-04-22 ENCOUNTER — HOSPITAL ENCOUNTER (OUTPATIENT)
Dept: CT IMAGING | Facility: HOSPITAL | Age: 74
Discharge: HOME | End: 2024-04-22
Payer: COMMERCIAL

## 2024-04-22 DIAGNOSIS — Z85.9 HISTORY OF MALIGNANT NEUROENDOCRINE TUMOR: ICD-10-CM

## 2024-04-22 PROCEDURE — G1004 CDSM NDSC: HCPCS

## 2024-04-22 PROCEDURE — 74177 CT ABD & PELVIS W/CONTRAST: CPT

## 2024-04-22 PROCEDURE — 71260 CT THORAX DX C+: CPT

## 2024-04-22 RX ADMIN — IOHEXOL 100 ML: 350 INJECTION, SOLUTION INTRAVENOUS at 07:59

## 2024-04-24 ENCOUNTER — VBI (OUTPATIENT)
Dept: ADMINISTRATIVE | Facility: OTHER | Age: 74
End: 2024-04-24

## 2024-04-29 ENCOUNTER — OFFICE VISIT (OUTPATIENT)
Dept: SURGICAL ONCOLOGY | Facility: CLINIC | Age: 74
End: 2024-04-29
Payer: COMMERCIAL

## 2024-04-29 VITALS
TEMPERATURE: 97.9 F | SYSTOLIC BLOOD PRESSURE: 152 MMHG | HEART RATE: 73 BPM | WEIGHT: 205.8 LBS | OXYGEN SATURATION: 99 % | DIASTOLIC BLOOD PRESSURE: 110 MMHG | HEIGHT: 68 IN | RESPIRATION RATE: 18 BRPM | BODY MASS INDEX: 31.19 KG/M2

## 2024-04-29 DIAGNOSIS — Z85.9 HISTORY OF MALIGNANT NEUROENDOCRINE TUMOR: Primary | ICD-10-CM

## 2024-04-29 PROCEDURE — 99213 OFFICE O/P EST LOW 20 MIN: CPT

## 2024-04-29 PROCEDURE — 1160F RVW MEDS BY RX/DR IN RCRD: CPT

## 2024-04-29 PROCEDURE — 1159F MED LIST DOCD IN RCRD: CPT

## 2024-04-29 PROCEDURE — 3080F DIAST BP >= 90 MM HG: CPT

## 2024-04-29 PROCEDURE — 3077F SYST BP >= 140 MM HG: CPT

## 2024-04-29 NOTE — PROGRESS NOTES
Surgical Oncology Follow Up       [facility] [place]  [address] [facility] [address]  [phone]    Radha Stephen  1950  [id number]  [facility] [place]  [address] [facility] [address]  [phone]    Diagnoses and all orders for this visit:    History of malignant neuroendocrine tumor  -     Chromogranin A; Future  -     BUN; Future  -     Creatinine, serum; Future  -     CT chest abdomen pelvis w contrast; Future        Chief Complaint   Patient presents with    Follow-up       Return in about 6 months (around 10/29/2024) for Office visit with Dr Marie, Labs - See Treatment Plan, Imaging - See orders.      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       - Lymph-vascular and perineural invasion are identified.       - 4 of 13 lymph nodes positive for metastatic well-differentiated neuroendocrine tumor (4/13).       - Largest lymph node deposit: 1.5 cm with extra-emigdio extension, (mesenteric mass).       - 3 mitotic figures per 2 mm2, (consistent with grade G2).       - The tumor is extremely close to the serosal surface (approximately .0003 cm from serosal surface).      Radiation    The patient saw @[facility] for radiation treatment. This is the current list of radiation treatment:  Radiation Treatments       No radiation treatments to show. (Treatments may have been administered in another system.)              Chemotherapy    [No treatment plan]     4/29/2020 -  Cancer Staged    Staging form: Neuroendocrine Tumor - Jejunum/Ileum, AJCC 8th Edition  - Pathologic stage from 4/29/2020: Stage III (pT3, pN1, cM0)  - Signed by JAHAIRA Lujan on 10/5/2023  Mitotic count: 3  Histologic grade (G): G2  Histologic grading system: 3 grade system  Lymph-vascular invasion (LVI): LVI present/identified, NOS  Mitotic unit: 10 HPF  Solitary (s) or multifocal (m) tumors in the primary site: Solitary  Clinical status of regional lymph nodes: Metastases confirmed by pathologic examination  Total positive nodes: 4           Staging: [id number] well-differentiated neuroendocrine tumor of the ileum, April 2020  Treatment history:  Small-bowel resection with mesenteric nodes, April 2020  Current treatment:  Observation.   Disease status: KAREN.  Stable benign adrenal nodule. Stable/decreasing right psoas mass.      History of Present Illness: The patient returns to the office today in follow-up for her history of a stage III small bowel NET.  She is currently KAREN at 5 years and doing well.  She denies any abdominal pain, weight loss, appetite changes, diarrhea, flushing, sweating or palpations.  She denies any new SOB or cough.  A chromogranin A level has been drawn, and CT was performed on April 22.  I have reviewed these results myself and discussed them with the patient today.       Review of Systems   Constitutional:  Negative for activity change, appetite change, fatigue and unexpected weight change.   HENT: Negative.     Respiratory: Negative.  Negative for cough and shortness of breath.    Cardiovascular: Negative.    Gastrointestinal: Negative.  Negative for abdominal distention, abdominal pain, diarrhea, nausea and vomiting.   Musculoskeletal: Negative.    Skin: Negative.  Negative for color change.   Neurological: Negative.  Negative for dizziness, light-headedness and headaches.   Hematological: Negative.  Negative for adenopathy.   Psychiatric/Behavioral: Negative.             Patient Active Problem List   Diagnosis    Colitis    Pure hypercholesterolemia    Encounter for administration of vaccine    Essential hypertension     Stage 3a chronic kidney disease (HCC)    History of malignant neuroendocrine tumor    Adrenal nodule (HCC)    Encounter for follow-up examination after completed treatment for malignant neoplasm    Thickened endometrium    Medicare annual wellness visit, subsequent    Anxiety about health    Vitamin D deficiency    Class 1 obesity due to excess calories without serious comorbidity with body mass index (BMI) of 31.0 to 31.9 in adult    Personal history of COVID-19    Arthritis of right hip    Arthritis of right knee    Diverticulosis of colon with hemorrhage    History of endometrial biopsy    Abnormal pelvic ultrasound    Abnormal CT of the abdomen    Iliopsoas bursitis of right hip    Splenic lesion    Hepatic cyst    Retrolisthesis of vertebrae    Screening for diabetes mellitus    Encounter for vaccination    History of colonoscopy    Seasonal allergies    Encounter for immunization    Encounter for screening mammogram for breast cancer    Personal history of rectal cancer    Hypertensive kidney disease with chronic kidney disease     Past Medical History:   Diagnosis Date    Allergic 1960    Seasonal    Anxiety 2010    Arthritis     Cancer (HCC)     Colon nodule    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    Colon polyps     COVID-19 virus detected 12/28/2023    Started 12/27/2023, positive 12/28/2023     Diverticulitis of colon     Essential hypertension     Hematuria syndrome     Hypertension     Obesity     Osteoarthritis     Proteinuria     Rectal cancer (HCC) 07/03/2019    Vitamin D deficiency      Past Surgical History:   Procedure Laterality Date    BREAST BIOPSY Right 2017    benign    COLONOSCOPY  12/2018    3 polyps     COLONOSCOPY      several    MAMMO STEREOTACTIC BREAST BIOPSY LEFT (ALL INC) Left 6/18/2020    MAMMO STEREOTACTIC BREAST BIOPSY RIGHT (ALL INC) Right 6/18/2020    IN LAPS ABD PRTM&OMENTUM DX W/WO SPEC BR/WA SPX N/A 3/24/2020    Procedure: Laparoscopic  biopsy of small bowel mesenteric neoplasm wi frozen section;  Surgeon: Fabricio Tracey MD;  Location:  MAIN OR;  Service: General    RECTAL BIOPSY N/A 2018    Procedure: TRANSANAL EXCISION RECTAL POLYP;MUCOSAL POLYPECTOMY;  Surgeon: BETTYE Clifford MD;  Location:  MAIN OR;  Service: Colorectal    SMALL INTESTINE SURGERY N/A 2020    Procedure: RESECTION SMALL BOWEL;  Surgeon: Janes Marie MD;  Location:  MAIN OR;  Service: Surgical Oncology     Family History   Problem Relation Age of Onset    Hypertension Mother     Heart disease Father     No Known Problems Daughter     No Known Problems Maternal Grandmother     No Known Problems Maternal Grandfather     No Known Problems Paternal Grandmother     No Known Problems Paternal Grandfather     Pancreatic cancer Brother     No Known Problems Maternal Aunt     No Known Problems Paternal Aunt     No Known Problems Paternal Aunt      Social History     Socioeconomic History    Marital status: /Civil Union     Spouse name: Not on file    Number of children: Not on file    Years of education: Not on file    Highest education level: Not on file   Occupational History    Not on file   Tobacco Use    Smoking status: Former     Current packs/day: 0.00     Types: Cigarettes     Quit date:      Years since quittin.3     Passive exposure: Past    Smokeless tobacco: Never    Tobacco comments:     30  YEARS AGO    Vaping Use    Vaping status: Never Used   Substance and Sexual Activity    Alcohol use: Yes     Alcohol/week: 2.0 standard drinks of alcohol     Types: 2 Glasses of wine per week     Comment: Occassional/social    Drug use: Never    Sexual activity: Not Currently     Partners: Female     Birth control/protection: None   Other Topics Concern    Not on file   Social History Narrative    Not on file     Social Determinants of Health     Financial Resource Strain: Low Risk  (10/16/2023)    Overall Financial Resource Strain ([facility])      Difficulty of Paying Living Expenses: Not hard at all   Food Insecurity: Not on file   Transportation Needs: No Transportation Needs (10/16/2023)    PRAPARE - Transportation     Lack of Transportation (Medical): No     Lack of Transportation (Non-Medical): No   Physical Activity: Not on file   Stress: Not on file   Social Connections: Not on file   Intimate Partner Violence: Not on file   Housing Stability: Not on file       Current Outpatient Medications:     Cholecalciferol (Vitamin D3) 50 MCG (2000 UT) capsule, Take 1 capsule (2,000 Units total) by mouth every morning, Disp: , Rfl:     co-enzyme Q-10 50 MG capsule, Take 1 capsule (50 mg total) by mouth every other day, Disp: , Rfl:     fluticasone (FLONASE) 50 mcg/act nasal spray, administer 1 spray into each nostril daily (Patient taking differently: 2 sprays into each nostril as needed for rhinitis or allergies), Disp: 48 g, Rfl: 1    hydrALAZINE (APRESOLINE) 50 mg tablet, Take 1 tablet (50 mg total) by mouth 3 (three) times a day, Disp: 270 tablet, Rfl: 3    lisinopril (ZESTRIL) 20 mg tablet, Take 1 tablet (20 mg total) by mouth 2 (two) times a day, Disp: 180 tablet, Rfl: 3    metoprolol tartrate (LOPRESSOR) 100 mg tablet, TAKE ONE TABLET BY MOUTH TWICE A DAY, Disp: 180 tablet, Rfl: 3    Multiple Vitamins-Minerals (CENTRUM PO), Take by mouth in the morning, Disp: , Rfl:     rosuvastatin (CRESTOR) 20 MG tablet, Take 1 tablet (20 mg total) by mouth 2 (two) times a week Take 1 tablet twice a week, Disp: 51 tablet, Rfl: 1    Current Facility-Administered Medications:     cephalexin (KEFLEX) capsule 500 mg, 500 mg, Oral, Q6H [facility], Edita Pires MD  No Known Allergies  Vitals:    04/29/24 0810   BP: (!) 152/110   Pulse: 73   Resp: 18   Temp: 97.9 °F (36.6 °C)   SpO2: 99%       Physical Exam  Vitals reviewed.   Constitutional:       General: She is not in acute distress.     Appearance: Normal appearance. She is normal weight. She is not ill-appearing or  toxic-appearing.   HENT:      Head: Normocephalic and atraumatic.      Nose: Nose normal.      Mouth/Throat:      Mouth: Mucous membranes are moist.   Eyes:      General: No scleral icterus.     Conjunctiva/sclera: Conjunctivae normal.   Cardiovascular:      Rate and Rhythm: Normal rate.   Pulmonary:      Effort: Pulmonary effort is normal.   Abdominal:      General: Abdomen is flat. There is no distension.      Palpations: Abdomen is soft. There is no mass.      Tenderness: There is no abdominal tenderness. There is no guarding.   Musculoskeletal:         General: Normal range of motion.      Cervical back: Normal range of motion and neck supple.   Skin:     General: Skin is warm and dry.      Coloration: Skin is not jaundiced.      Findings: No erythema.   Neurological:      General: No focal deficit present.      Mental Status: She is alert and oriented to person, place, and time.   Psychiatric:         Mood and Affect: Mood normal.         Behavior: Behavior normal.         Thought Content: Thought content normal.         Judgment: Judgment normal.         Labs:  Chromogranin A  Collected 4/10/2024        Component  Ref Range & Units 2 wk ago   Chromogranin A  0.0 - 101.8 ng/mL           Imaging  CT chest abdomen pelvis w contrast    Result Date: 4/22/2024  Narrative: CT CHEST, ABDOMEN AND PELVIS WITH IV CONTRAST INDICATION: Z85.9: Personal history of malignant neoplasm, unspecified. History of neuroendocrine tumor of the small intestine COMPARISON: CT abdomen pelvis 10/16/2023 and 5/25/2021, CT chest, abdomen and pelvis 12/9/2021, pelvic ultrasound 8/11/2021 TECHNIQUE: CT examination of the chest, abdomen and pelvis was performed. Multiplanar 2D reformatted images were created from the source data. This examination, like all CT scans performed in the [facility], was performed utilizing techniques to minimize radiation dose exposure, including the use of iterative reconstruction and automated  exposure control. Radiation dose length product (DLP) for this visit: 814.87 mGy-cm IV Contrast: 100 mL of iohexol (OMNIPAQUE) Enteric Contrast: Not administered. FINDINGS: CHEST LUNGS: Lungs are clear. No tracheal or endobronchial lesion. PLEURA: Unremarkable. HEART/GREAT VESSELS: Heavy atherosclerotic coronary artery calcification. No thoracic aortic aneurysm. MEDIASTINUM AND KAILYN: Unremarkable. CHEST WALL AND LOWER NECK: Unremarkable. ABDOMEN LIVER/BILIARY TREE: Subcentimeter hypoattenuating lesion(s), too small to characterize but statistically likely benign, which do not require follow-up (ACR White Paper 2017). No suspicious mass. Normal hepatic contours. No biliary dilation. GALLBLADDER: No calcified gallstones. No pericholecystic inflammatory change. SPLEEN: Unremarkable. PANCREAS: Unremarkable. ADRENAL GLANDS: Stable benign 1.1 cm right adrenal nodule and left adrenal calcifications. KIDNEYS/URETERS: Unremarkable. No hydronephrosis. STOMACH AND BOWEL: Small bowel anastomosis in the right lower quadrant without imaging findings of local recurrence. APPENDIX: Normal. ABDOMINOPELVIC CAVITY: No ascites. No pneumoperitoneum. No lymphadenopathy. VESSELS: Unremarkable for patient's age. PELVIS REPRODUCTIVE ORGANS: Exophytic posterior 1.1 cm uterine fibroid. Stable mild thickening of the endometrial stripe measuring 6 mm (series 605 image 120) compared to 5/21/2021 with heterogeneity. Please refer to ultrasound 8/11/2021 for better characterization URINARY BLADDER: Unremarkable. ABDOMINAL WALL/INGUINAL REGIONS: Stable appearance of right iliopsoas bursitis compared to 6/17/2022 BONES: No acute fracture or suspicious osseous lesion.     Impression: Stable small bowel anastomosis with postoperative changes. Workstation performed: [id number]     I personally reviewed and interpreted the above laboratory and imaging data.    Discussion/Summary: This is a 74 y/o female who presents today for NET surveillance.  She is  doing well and there is no evidence of disease recurrence at this time.  I will see her again in 6 months for another clinical exam after CT and bloodwork, or sooner should the need arise.  She is agreeable to the plan, all questions were answered today.

## 2024-04-29 NOTE — LETTER
April 29, 2024     Janes Marie MD  [address]  2nd Floor  [facility] [address]    Patient: Radha Stephen   YOB: 1950   Date of Visit: 4/29/2024       Dear Dr. Marie:    Thank you for referring Radha Stephen to me for evaluation. Below are my notes for this consultation.    If you have questions, please do not hesitate to call me. I look forward to following your patient along with you.         Sincerely,        JAHAIRA Lujan        CC: No Recipients    JAHAIRA Lujan  4/29/2024  9:23 AM  Sign when Signing Visit               Surgical Oncology Follow Up       [facility] SURGICAL ONCOLOGY [facility]  [address] [address]  [phone]    Radha Stephen  1950  [id number]  [facility] SURGICAL ONCOLOGY [facility]  [address] [address]  [phone]    Diagnoses and all orders for this visit:    History of malignant neuroendocrine tumor  -     Chromogranin A; Future  -     BUN; Future  -     Creatinine, serum; Future  -     CT chest abdomen pelvis w contrast; Future        Chief Complaint   Patient presents with   • Follow-up       Return in about 6 months (around 10/29/2024) for Office visit with Dr Marie, Labs - See Treatment Plan, Imaging - See orders.      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       - Lymph-vascular and perineural invasion are identified.       - 4 of 13 lymph nodes positive for metastatic well-differentiated neuroendocrine tumor (4/13).       - Largest lymph node deposit: 1.5 cm with extra-emigdio extension, (mesenteric mass).       - 3 mitotic figures per 2 mm2, (consistent with grade  G2).       - The tumor is extremely close to the serosal surface (approximately .0003 cm from serosal surface).      Radiation    The patient saw @Topic@ for radiation treatment. This is the current list of radiation treatment:  Radiation Treatments       No radiation treatments to show. (Treatments may have been administered in another system.)              Chemotherapy    [No treatment plan]     4/29/2020 -  Cancer Staged    Staging form: Neuroendocrine Tumor - Jejunum/Ileum, AJCC 8th Edition  - Pathologic stage from 4/29/2020: Stage III (pT3, pN1, cM0) - Signed by JAHAIRA Lujan on 10/5/2023  Mitotic count: 3  Histologic grade (G): G2  Histologic grading system: 3 grade system  Lymph-vascular invasion (LVI): LVI present/identified, NOS  Mitotic unit: 10 HPF  Solitary (s) or multifocal (m) tumors in the primary site: Solitary  Clinical status of regional lymph nodes: Metastases confirmed by pathologic examination  Total positive nodes: 4           Staging: [id number] well-differentiated neuroendocrine tumor of the ileum, April 2020  Treatment history:  Small-bowel resection with mesenteric nodes, April 2020  Current treatment:  Observation.   Disease status: KAREN.  Stable benign adrenal nodule. Stable/decreasing right psoas mass.      History of Present Illness: The patient returns to the office today in follow-up for her history of a stage III small bowel NET.  She is currently KAREN at 5 years and doing well.  She denies any abdominal pain, weight loss, appetite changes, diarrhea, flushing, sweating or palpations.  She denies any new SOB or cough.  A chromogranin A level has been drawn, and CT was performed on April 22.  I have reviewed these results myself and discussed them with the patient today.       Review of Systems   Constitutional:  Negative for activity change, appetite change, fatigue and unexpected weight change.   HENT: Negative.     Respiratory: Negative.  Negative for cough and shortness of  breath.    Cardiovascular: Negative.    Gastrointestinal: Negative.  Negative for abdominal distention, abdominal pain, diarrhea, nausea and vomiting.   Musculoskeletal: Negative.    Skin: Negative.  Negative for color change.   Neurological: Negative.  Negative for dizziness, light-headedness and headaches.   Hematological: Negative.  Negative for adenopathy.   Psychiatric/Behavioral: Negative.             Patient Active Problem List   Diagnosis   • Colitis   • Pure hypercholesterolemia   • Encounter for administration of vaccine   • Essential hypertension   • Stage 3a chronic kidney disease (HCC)   • History of malignant neuroendocrine tumor   • Adrenal nodule (HCC)   • Encounter for follow-up examination after completed treatment for malignant neoplasm   • Thickened endometrium   • Medicare annual wellness visit, subsequent   • Anxiety about health   • Vitamin D deficiency   • Class 1 obesity due to excess calories without serious comorbidity with body mass index (BMI) of 31.0 to 31.9 in adult   • Personal history of COVID-19   • Arthritis of right hip   • Arthritis of right knee   • Diverticulosis of colon with hemorrhage   • History of endometrial biopsy   • Abnormal pelvic ultrasound   • Abnormal CT of the abdomen   • Iliopsoas bursitis of right hip   • Splenic lesion   • Hepatic cyst   • Retrolisthesis of vertebrae   • Screening for diabetes mellitus   • Encounter for vaccination   • History of colonoscopy   • Seasonal allergies   • Encounter for immunization   • Encounter for screening mammogram for breast cancer   • Personal history of rectal cancer   • Hypertensive kidney disease with chronic kidney disease     Past Medical History:   Diagnosis Date   • Allergic 1960    Seasonal   • Anxiety 2010   • Arthritis    • Cancer (HCC)     Colon nodule   • 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   • Colon polyps    • COVID-19 virus detected 12/28/2023    Started 12/27/2023,  positive 2023    • Diverticulitis of colon    • Essential hypertension    • Hematuria syndrome    • Hypertension    • Obesity    • Osteoarthritis    • Proteinuria    • Rectal cancer (HCC) 2019   • Vitamin D deficiency      Past Surgical History:   Procedure Laterality Date   • BREAST BIOPSY Right 2017    benign   • COLONOSCOPY  2018    3 polyps    • COLONOSCOPY      several   • MAMMO STEREOTACTIC BREAST BIOPSY LEFT (ALL INC) Left 2020   • MAMMO STEREOTACTIC BREAST BIOPSY RIGHT (ALL INC) Right 2020   • MS LAPS ABD PRTM&OMENTUM DX W/WO SPEC BR/WA SPX N/A 3/24/2020    Procedure: 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;  Surgeon: Fabricio Tracey MD;  Location: [facility];  Service: General   • RECTAL BIOPSY N/A 2018    Procedure: TRANSANAL EXCISION RECTAL POLYP;MUCOSAL POLYPECTOMY;  Surgeon: BETTYE Clifford MD;  Location: [facility];  Service: Colorectal   • SMALL INTESTINE SURGERY N/A 2020    Procedure: RESECTION SMALL BOWEL;  Surgeon: Janes Marie MD;  Location: [facility];  Service: Surgical Oncology     Family History   Problem Relation Age of Onset   • Hypertension Mother    • Heart disease Father    • No Known Problems Daughter    • No Known Problems Maternal Grandmother    • No Known Problems Maternal Grandfather    • No Known Problems Paternal Grandmother    • No Known Problems Paternal Grandfather    • Pancreatic cancer Brother    • No Known Problems Maternal Aunt    • No Known Problems Paternal Aunt    • No Known Problems Paternal Aunt      Social History     Socioeconomic History   • Marital status: /Civil Union     Spouse name: Not on file   • Number of children: Not on file   • Years of education: Not on file   • Highest education level: Not on file   Occupational History   • Not on file   Tobacco Use   • Smoking status: Former     Current packs/day: 0.00     Types: Cigarettes     Quit date:      Years since quittin.3      Passive exposure: Past   • Smokeless tobacco: Never   • Tobacco comments:     30  YEARS AGO    Vaping Use   • Vaping status: Never Used   Substance and Sexual Activity   • Alcohol use: Yes     Alcohol/week: 2.0 standard drinks of alcohol     Types: 2 Glasses of wine per week     Comment: Occassional/social   • Drug use: Never   • Sexual activity: Not Currently     Partners: Female     Birth control/protection: None   Other Topics Concern   • Not on file   Social History Narrative   • Not on file     Social Determinants of Health     Financial Resource Strain: Low Risk  (10/16/2023)    Overall Financial Resource Strain (CARDIA)    • Difficulty of Paying Living Expenses: Not hard at all   Food Insecurity: Not on file   Transportation Needs: No Transportation Needs (10/16/2023)    PRAPARE - Transportation    • Lack of Transportation (Medical): No    • Lack of Transportation (Non-Medical): No   Physical Activity: Not on file   Stress: Not on file   Social Connections: Not on file   Intimate Partner Violence: Not on file   Housing Stability: Not on file       Current Outpatient Medications:   •  Cholecalciferol (Vitamin D3) 50 MCG (2000 UT) capsule, Take 1 capsule (2,000 Units total) by mouth every morning, Disp: , Rfl:   •  co-enzyme Q-10 50 MG capsule, Take 1 capsule (50 mg total) by mouth every other day, Disp: , Rfl:   •  fluticasone (FLONASE) 50 mcg/act nasal spray, administer 1 spray into each nostril daily (Patient taking differently: 2 sprays into each nostril as needed for rhinitis or allergies), Disp: 48 g, Rfl: 1  •  hydrALAZINE (APRESOLINE) 50 mg tablet, Take 1 tablet (50 mg total) by mouth 3 (three) times a day, Disp: 270 tablet, Rfl: 3  •  lisinopril (ZESTRIL) 20 mg tablet, Take 1 tablet (20 mg total) by mouth 2 (two) times a day, Disp: 180 tablet, Rfl: 3  •  metoprolol tartrate (LOPRESSOR) 100 mg tablet, TAKE ONE TABLET BY MOUTH TWICE A DAY, Disp: 180 tablet, Rfl: 3  •  Multiple Vitamins-Minerals  (CENTRUM PO), Take by mouth in the morning, Disp: , Rfl:   •  rosuvastatin (CRESTOR) 20 MG tablet, Take 1 tablet (20 mg total) by mouth 2 (two) times a week Take 1 tablet twice a week, Disp: 51 tablet, Rfl: 1    Current Facility-Administered Medications:   •  cephalexin (KEFLEX) capsule 500 mg, 500 mg, Oral, Q6H Edita BARNETT MD  No Known Allergies  Vitals:    04/29/24 0810   BP: (!) 152/110   Pulse: 73   Resp: 18   Temp: 97.9 °F (36.6 °C)   SpO2: 99%       Physical Exam  Vitals reviewed.   Constitutional:       General: She is not in acute distress.     Appearance: Normal appearance. She is normal weight. She is not ill-appearing or toxic-appearing.   HENT:      Head: Normocephalic and atraumatic.      Nose: Nose normal.      Mouth/Throat:      Mouth: Mucous membranes are moist.   Eyes:      General: No scleral icterus.     Conjunctiva/sclera: Conjunctivae normal.   Cardiovascular:      Rate and Rhythm: Normal rate.   Pulmonary:      Effort: Pulmonary effort is normal.   Abdominal:      General: Abdomen is flat. There is no distension.      Palpations: Abdomen is soft. There is no mass.      Tenderness: There is no abdominal tenderness. There is no guarding.   Musculoskeletal:         General: Normal range of motion.      Cervical back: Normal range of motion and neck supple.   Skin:     General: Skin is warm and dry.      Coloration: Skin is not jaundiced.      Findings: No erythema.   Neurological:      General: No focal deficit present.      Mental Status: She is alert and oriented to person, place, and time.   Psychiatric:         Mood and Affect: Mood normal.         Behavior: Behavior normal.         Thought Content: Thought content normal.         Judgment: Judgment normal.         Labs:  Chromogranin A  Collected 4/10/2024        Component  Ref Range & Units 2 wk ago   Chromogranin A  0.0 - 101.8 ng/mL           Imaging  CT chest abdomen pelvis w contrast    Result Date: 4/22/2024  Narrative: CT  CHEST, ABDOMEN AND PELVIS WITH IV CONTRAST INDICATION: Z85.9: Personal history of malignant neoplasm, unspecified. History of neuroendocrine tumor of the small intestine COMPARISON: CT abdomen pelvis 10/16/2023 and 5/25/2021, CT chest, abdomen and pelvis 12/9/2021, pelvic ultrasound 8/11/2021 TECHNIQUE: CT examination of the chest, abdomen and pelvis was performed. Multiplanar 2D reformatted images were created from the source data. This examination, like all CT scans performed in the [facility], was performed utilizing techniques to minimize radiation dose exposure, including the use of iterative reconstruction and automated exposure control. Radiation dose length product (DLP) for this visit: 814.87 mGy-cm IV Contrast: 100 mL of iohexol (OMNIPAQUE) Enteric Contrast: Not administered. FINDINGS: CHEST LUNGS: Lungs are clear. No tracheal or endobronchial lesion. PLEURA: Unremarkable. HEART/GREAT VESSELS: Heavy atherosclerotic coronary artery calcification. No thoracic aortic aneurysm. MEDIASTINUM AND KAILYN: Unremarkable. CHEST WALL AND LOWER NECK: Unremarkable. ABDOMEN LIVER/BILIARY TREE: Subcentimeter hypoattenuating lesion(s), too small to characterize but statistically likely benign, which do not require follow-up (ACR White Paper 2017). No suspicious mass. Normal hepatic contours. No biliary dilation. GALLBLADDER: No calcified gallstones. No pericholecystic inflammatory change. SPLEEN: Unremarkable. PANCREAS: Unremarkable. ADRENAL GLANDS: Stable benign 1.1 cm right adrenal nodule and left adrenal calcifications. KIDNEYS/URETERS: Unremarkable. No hydronephrosis. STOMACH AND BOWEL: Small bowel anastomosis in the right lower quadrant without imaging findings of local recurrence. APPENDIX: Normal. ABDOMINOPELVIC CAVITY: No ascites. No pneumoperitoneum. No lymphadenopathy. VESSELS: Unremarkable for patient's age. PELVIS REPRODUCTIVE ORGANS: Exophytic posterior 1.1 cm uterine fibroid. Stable mild  thickening of the endometrial stripe measuring 6 mm (series 605 image 120) compared to 5/21/2021 with heterogeneity. Please refer to ultrasound 8/11/2021 for better characterization URINARY BLADDER: Unremarkable. ABDOMINAL WALL/INGUINAL REGIONS: Stable appearance of right iliopsoas bursitis compared to 6/17/2022 BONES: No acute fracture or suspicious osseous lesion.     Impression: Stable small bowel anastomosis with postoperative changes. Workstation performed: [id number]     I personally reviewed and interpreted the above laboratory and imaging data.    Discussion/Summary: This is a 74 y/o female who presents today for NET surveillance.  She is doing well and there is no evidence of disease recurrence at this time.  I will see her again in 6 months for another clinical exam after CT and bloodwork, or sooner should the need arise.  She is agreeable to the plan, all questions were answered today.

## 2024-05-16 PROBLEM — Z13.1 SCREENING FOR DIABETES MELLITUS: Status: RESOLVED | Noted: 2022-10-11 | Resolved: 2024-05-16

## 2024-05-16 PROBLEM — Z00.00 MEDICARE ANNUAL WELLNESS VISIT, SUBSEQUENT: Status: RESOLVED | Noted: 2021-08-10 | Resolved: 2024-05-16

## 2024-08-03 DIAGNOSIS — I10 ESSENTIAL HYPERTENSION: ICD-10-CM

## 2024-08-03 RX ORDER — METOPROLOL TARTRATE 100 MG/1
TABLET ORAL
Qty: 180 TABLET | Refills: 3 | Status: SHIPPED | OUTPATIENT
Start: 2024-08-03

## 2024-08-15 ENCOUNTER — TELEPHONE (OUTPATIENT)
Dept: INTERNAL MEDICINE CLINIC | Facility: CLINIC | Age: 74
End: 2024-08-15

## 2024-08-15 NOTE — TELEPHONE ENCOUNTER
lm informing pt that roe is moving offices and will have to rs either at the new office or here in [place] with another provider

## 2024-09-30 DIAGNOSIS — E78.00 PURE HYPERCHOLESTEROLEMIA: ICD-10-CM

## 2024-09-30 NOTE — TELEPHONE ENCOUNTER
Reason for call:   [x] Refill   [] Prior Auth  [] Other:     Office:   [x] PCP/Provider -   [] Specialty/Provider -     Medication:     rosuvastatin (CRESTOR) 20 MG tablet       Dose/Frequency: Take 1 tablet (20 mg total) by mouth 2 (two) times a week Take 1 tablet twice a week,     Quantity: 51 tablet    Pharmacy: [facility] - [address]     Does the patient have enough for 3 days?   [x] Yes   [] No - Send as HP to POD

## 2024-10-01 RX ORDER — ROSUVASTATIN CALCIUM 20 MG/1
20 TABLET, COATED ORAL 2 TIMES WEEKLY
Qty: 51 TABLET | Refills: 0 | Status: SHIPPED | OUTPATIENT
Start: 2024-10-03

## 2024-10-04 ENCOUNTER — APPOINTMENT (OUTPATIENT)
Dept: LAB | Facility: CLINIC | Age: 74
End: 2024-10-04
Payer: COMMERCIAL

## 2024-10-04 DIAGNOSIS — I10 ESSENTIAL HYPERTENSION: ICD-10-CM

## 2024-10-04 DIAGNOSIS — E55.9 VITAMIN D DEFICIENCY: ICD-10-CM

## 2024-10-04 DIAGNOSIS — N18.31 STAGE 3A CHRONIC KIDNEY DISEASE (HCC): ICD-10-CM

## 2024-10-04 DIAGNOSIS — Z85.9 HISTORY OF MALIGNANT NEUROENDOCRINE TUMOR: ICD-10-CM

## 2024-10-04 DIAGNOSIS — Z13.1 SCREENING FOR DIABETES MELLITUS: ICD-10-CM

## 2024-10-04 DIAGNOSIS — R79.9 ABNORMAL FINDING OF BLOOD CHEMISTRY, UNSPECIFIED: ICD-10-CM

## 2024-10-04 DIAGNOSIS — E78.00 PURE HYPERCHOLESTEROLEMIA: ICD-10-CM

## 2024-10-04 LAB
25(OH)D3 SERPL-MCNC: 63.1 NG/ML (ref 30–100)
ALBUMIN SERPL BCG-MCNC: 4.2 G/DL (ref 3.5–5)
ALP SERPL-CCNC: 56 U/L (ref 34–104)
ALT SERPL W P-5'-P-CCNC: 9 U/L (ref 7–52)
ANION GAP SERPL CALCULATED.3IONS-SCNC: 6 MMOL/L (ref 4–13)
AST SERPL W P-5'-P-CCNC: 14 U/L (ref 13–39)
BILIRUB SERPL-MCNC: 0.64 MG/DL (ref 0.2–1)
BUN SERPL-MCNC: 12 MG/DL (ref 5–25)
CALCIUM SERPL-MCNC: 9.2 MG/DL (ref 8.4–10.2)
CHLORIDE SERPL-SCNC: 100 MMOL/L (ref 96–108)
CHOLEST SERPL-MCNC: 171 MG/DL
CO2 SERPL-SCNC: 29 MMOL/L (ref 21–32)
CREAT SERPL-MCNC: 1.01 MG/DL (ref 0.6–1.3)
ERYTHROCYTE [DISTWIDTH] IN BLOOD BY AUTOMATED COUNT: 12.5 % (ref 11.6–15.1)
EST. AVERAGE GLUCOSE BLD GHB EST-MCNC: 108 MG/DL
GFR SERPL CREATININE-BSD FRML MDRD: 55 ML/MIN/1.73SQ M
GLUCOSE P FAST SERPL-MCNC: 91 MG/DL (ref 65–99)
HBA1C MFR BLD: 5.4 %
HCT VFR BLD AUTO: 38.3 % (ref 34.8–46.1)
HDLC SERPL-MCNC: 45 MG/DL
HGB BLD-MCNC: 12.5 G/DL (ref 11.5–15.4)
LDLC SERPL CALC-MCNC: 89 MG/DL (ref 0–100)
MCH RBC QN AUTO: 29.2 PG (ref 26.8–34.3)
MCHC RBC AUTO-ENTMCNC: 32.6 G/DL (ref 31.4–37.4)
MCV RBC AUTO: 90 FL (ref 82–98)
NONHDLC SERPL-MCNC: 126 MG/DL
PLATELET # BLD AUTO: 410 THOUSANDS/UL (ref 149–390)
PMV BLD AUTO: 9.1 FL (ref 8.9–12.7)
POTASSIUM SERPL-SCNC: 4.6 MMOL/L (ref 3.5–5.3)
PROT SERPL-MCNC: 7.2 G/DL (ref 6.4–8.4)
RBC # BLD AUTO: 4.28 MILLION/UL (ref 3.81–5.12)
SODIUM SERPL-SCNC: 135 MMOL/L (ref 135–147)
TRIGL SERPL-MCNC: 187 MG/DL
WBC # BLD AUTO: 6.73 THOUSAND/UL (ref 4.31–10.16)

## 2024-10-04 PROCEDURE — 80061 LIPID PANEL: CPT

## 2024-10-04 PROCEDURE — 82306 VITAMIN D 25 HYDROXY: CPT

## 2024-10-04 PROCEDURE — 83036 HEMOGLOBIN GLYCOSYLATED A1C: CPT

## 2024-10-04 PROCEDURE — 36415 COLL VENOUS BLD VENIPUNCTURE: CPT

## 2024-10-04 PROCEDURE — 85027 COMPLETE CBC AUTOMATED: CPT

## 2024-10-04 PROCEDURE — 80053 COMPREHEN METABOLIC PANEL: CPT

## 2024-10-04 PROCEDURE — 86316 IMMUNOASSAY TUMOR OTHER: CPT

## 2024-10-08 LAB — CGA SERPL-MCNC: 56.5 NG/ML (ref 0–101.8)

## 2024-10-14 ENCOUNTER — TELEPHONE (OUTPATIENT)
Dept: SURGICAL ONCOLOGY | Facility: CLINIC | Age: 74
End: 2024-10-14

## 2024-10-14 NOTE — TELEPHONE ENCOUNTER
Called patient and spoke to Radha, we will able to get her rescheduled for her office visit with Dr. Marie as he will be in the OR the morning of her appointment. I did offer her an afternoon time on the same day but she stated that she can only come in the mornings. We rescheduled her to a different day. I confirmed the new date, time and location with the patient.

## 2024-10-18 ENCOUNTER — HOSPITAL ENCOUNTER (OUTPATIENT)
Dept: CT IMAGING | Facility: HOSPITAL | Age: 74
End: 2024-10-18
Payer: COMMERCIAL

## 2024-10-18 DIAGNOSIS — Z85.9 HISTORY OF MALIGNANT NEUROENDOCRINE TUMOR: ICD-10-CM

## 2024-10-18 PROCEDURE — 71260 CT THORAX DX C+: CPT

## 2024-10-18 PROCEDURE — 74177 CT ABD & PELVIS W/CONTRAST: CPT

## 2024-10-18 RX ADMIN — IOHEXOL 100 ML: 350 INJECTION, SOLUTION INTRAVENOUS at 08:34

## 2024-10-24 ENCOUNTER — OFFICE VISIT (OUTPATIENT)
Dept: SURGICAL ONCOLOGY | Facility: CLINIC | Age: 74
End: 2024-10-24
Payer: COMMERCIAL

## 2024-10-24 VITALS
WEIGHT: 204 LBS | SYSTOLIC BLOOD PRESSURE: 140 MMHG | OXYGEN SATURATION: 100 % | HEIGHT: 68 IN | TEMPERATURE: 97.5 F | RESPIRATION RATE: 18 BRPM | DIASTOLIC BLOOD PRESSURE: 78 MMHG | HEART RATE: 75 BPM | BODY MASS INDEX: 30.92 KG/M2

## 2024-10-24 DIAGNOSIS — Z08 ENCOUNTER FOR FOLLOW-UP EXAMINATION AFTER COMPLETED TREATMENT FOR MALIGNANT NEOPLASM: Primary | ICD-10-CM

## 2024-10-24 DIAGNOSIS — Z85.9 HISTORY OF MALIGNANT NEUROENDOCRINE TUMOR: ICD-10-CM

## 2024-10-24 PROCEDURE — 99214 OFFICE O/P EST MOD 30 MIN: CPT | Performed by: SURGERY

## 2024-10-24 RX ORDER — HYDROQUINONE 40 MG/G
CREAM TOPICAL
COMMUNITY
Start: 2024-09-11

## 2024-10-24 RX ORDER — TRIAMCINOLONE ACETONIDE 1 MG/G
CREAM TOPICAL
COMMUNITY
Start: 2024-10-16

## 2024-10-24 RX ORDER — METRONIDAZOLE 7.5 MG/G
GEL TOPICAL
COMMUNITY
Start: 2024-09-11

## 2024-10-24 NOTE — ASSESSMENT & PLAN NOTE
74 year-old female status post small bowel resection and resection of a mesenteric mass for a [id number] 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.  She is doing very well.  She is clinically KAREN at 5-1/3  years.  Her adrenal nodule is stable.  Her pheochromocytoma workup was negative.  I suspect this is not related to her neuroendocrine tumor.  This area is stable on her CT.  I do not think she requires any further treatment at this point rather than observation.  There is no evidence of recurrence by physical exam, imaging, symptomatology, or laboratory evaluation.  I will plan on seeing her again in 6 months with a repeat CT and chromogranin level.  If her next imaging and labs are normal, I would likely just repeat her chromogranin yearly.  She is agreeable to this plan.  All her questions were answered.

## 2024-10-24 NOTE — LETTER
October 24, 2024     Sylvia Arias, JAHAIRA  [address]   [address]  [facility] [address]    Patient: Radha Stephen   YOB: 1950   Date of Visit: 10/24/2024       Dear Dr. Arias:    Thank you for referring Radha Stephen to me for evaluation. Below are my notes for this consultation.    If you have questions, please do not hesitate to call me. I look forward to following your patient along with you.         Sincerely,        Janes Marie MD        CC: No Recipients    Janes Marie MD  10/24/2024 10:45 AM  Sign when Signing Visit               Surgical Oncology Follow Up       [facility] [place]  [address] [facility] [address] [address]  [phone]    Radha Stephen  1950  [id number]  [facility]  [address] [facility] [address] [address]  [phone]    1. Encounter for follow-up examination after completed treatment for malignant neoplasm  2. 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  She is doing very well.  She is clinically KAREN at 4-1/2  years.  Her adrenal nodule is stable.  Her pheochromocytoma workup was negative.  I suspect this is not related to her neuroendocrine tumor.  This area is stable on her CT.  I do not think she requires any further treatment at this point rather than observation.  There is no evidence of recurrence by physical exam, imaging, symptomatology, or laboratory evaluation.  I will plan on seeing her again in 6 months with a repeat CT and chromogranin level.  She is agreeable to this plan.  All her questions were answered.   Orders:  -     CT chest abdomen pelvis w contrast; Future; Expected date: 04/24/2025  -     BUN; Future; Expected date:  04/15/2025  -     Chromogranin A; Future; Expected date: 04/15/2025  -     Creatinine, serum; Future; Expected date: 04/15/2025         Chief Complaint   Patient presents with   • office visit       Return in about 6 months (around 4/24/2025) for Ofice visit short, Imaging - See orders, with Sujatha, Labs - See Treatment Plan.      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       - Lymph-vascular and perineural invasion are identified.       - 4 of 13 lymph nodes positive for metastatic well-differentiated neuroendocrine tumor (4/13).       - Largest lymph node deposit: 1.5 cm with extra-emigdio extension, (mesenteric mass).       - 3 mitotic figures per 2 mm2, (consistent with grade G2).       - The tumor is extremely close to the serosal surface (approximately .0003 cm from serosal surface).      Radiation    The patient saw @"[employer] for radiation treatment. This is the current list of radiation treatment:  Radiation Treatments       No radiation treatments to show. (Treatments may have been administered in another system.)              Chemotherapy    [No treatment plan]     4/29/2020 -  Cancer Staged    Staging form: Neuroendocrine Tumor - Jejunum/Ileum, AJCC 8th Edition  - Pathologic stage from 4/29/2020: Stage III (pT3, pN1, cM0) - Signed by SujathaJAHAIRA Wesley on 10/5/2023  Mitotic count: 3  Histologic grade (G): G2  Histologic grading system: 3 grade system  Lymph-vascular invasion (LVI): LVI present/identified, NOS  Mitotic unit: 10 HPF  Solitary (s) or multifocal (m) tumors in the primary site: Solitary  Clinical status of regional lymph nodes: Metastases confirmed by pathologic examination  Total positive nodes: 4           Staging: N2I1N1S3 well-differentiated  neuroendocrine tumor of the ileum, April 2020  Treatment history:  Small-bowel resection with mesenteric nodes, April 2020  Current treatment:  Observation.   Disease status: KAREN.  Stable benign adrenal nodule. Stable/decreasing right psoas mass.    History of Present Illness: Patient returns in follow-up of her neuroendocrine tumor.  She is doing well at this time with no complaints.  No flushing, diarrhea, palpitations, headaches or sweats.  No change in appetite or unintentional weight loss.  Her last colonoscopy was a year ago.  CT from October 18, 2024 shows no evidence of metastatic disease.  There are degenerated changes of the right hip.  I personally reviewed the films.  Chromogranin level is normal at 56.5 ng/mL.    Review of Systems  Complete ROS Surg Onc:   Complete ROS Surg Onc:   Constitutional: The patient denies new or recent history of general fatigue, no recent weight loss, no change in appetite.   Eyes: No complaints of visual problems, no scleral icterus.   ENT: no complaints of ear pain, no hoarseness, no difficulty swallowing,  no tinnitus and no new masses in head, oral cavity, or neck.   Cardiovascular: No complaints of chest pain, no palpitations, no ankle edema.   Respiratory: No complaints of shortness of breath, no cough.   Gastrointestinal: No complaints of jaundice, no bloody stools, no pale stools.   Genitourinary: No complaints of dysuria, no hematuria, no nocturia, no frequent urination, no urethral discharge.   Musculoskeletal: No complaints of weakness, paralysis, joint stiffness or arthralgias.  Integumentary: No complaints of rash, no new lesions.   Neurological: No complaints of convulsions, no seizures, no dizziness.   Hematologic/Lymphatic: No complaints of easy bruising.   Endocrine:  No hot or cold intolerance.  No polydipsia, polyphagia, or polyuria.  Allergy/immunology:  No environmental allergies.  No food allergies.  Not immunocompromised.  Skin:  No pallor or rash.  No  wound.        Patient Active Problem List   Diagnosis   • Colitis   • Pure hypercholesterolemia   • Encounter for administration of vaccine   • Essential hypertension   • Stage 3a chronic kidney disease (HCC)   • History of malignant neuroendocrine tumor   • Adrenal nodule (HCC)   • Encounter for follow-up examination after completed treatment for malignant neoplasm   • Thickened endometrium   • Anxiety about health   • Vitamin D deficiency   • Class 1 obesity due to excess calories without serious comorbidity with body mass index (BMI) of 31.0 to 31.9 in adult   • Personal history of COVID-19   • Arthritis of right hip   • Arthritis of right knee   • Diverticulosis of colon with hemorrhage   • History of endometrial biopsy   • Abnormal pelvic ultrasound   • Abnormal CT of the abdomen   • Iliopsoas bursitis of right hip   • Splenic lesion   • Hepatic cyst   • Retrolisthesis of vertebrae   • Encounter for vaccination   • History of colonoscopy   • Seasonal allergies   • Encounter for immunization   • Encounter for screening mammogram for breast cancer   • Personal history of rectal cancer   • Hypertensive kidney disease with chronic kidney disease     Past Medical History:   Diagnosis Date   • Allergic 1960    Seasonal   • Anxiety 2010   • Arthritis    • Cancer (HCC)     Colon nodule   • 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   • Colon polyps    • COVID-19 virus detected 12/28/2023    Started 12/27/2023, positive 12/28/2023    • Diverticulitis of colon    • Essential hypertension    • Hematuria syndrome    • Hypertension    • Obesity    • Osteoarthritis    • Proteinuria    • Rectal cancer (HCC) 07/03/2019   • Vitamin D deficiency      Past Surgical History:   Procedure Laterality Date   • BREAST BIOPSY Right 2017    benign   • COLONOSCOPY  12/2018    3 polyps    • COLONOSCOPY      several   • MAMMO STEREOTACTIC BREAST BIOPSY LEFT (ALL INC) Left 6/18/2020   • MAMMO STEREOTACTIC  BREAST BIOPSY RIGHT (ALL INC) Right 2020   • NH LAPS ABD PRTM&OMENTUM DX W/WO SPEC BR/WA SPX N/A 3/24/2020    Procedure: 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;  Surgeon: Fabricio Tracey MD;  Location:  MAIN OR;  Service: General   • RECTAL BIOPSY N/A 2018    Procedure: TRANSANAL EXCISION RECTAL POLYP;MUCOSAL POLYPECTOMY;  Surgeon: BETTYE Clifford MD;  Location:  MAIN OR;  Service: Colorectal   • SMALL INTESTINE SURGERY N/A 2020    Procedure: RESECTION SMALL BOWEL;  Surgeon: Janes Marie MD;  Location:  MAIN OR;  Service: Surgical Oncology     Family History   Problem Relation Age of Onset   • Hypertension Mother    • Heart disease Father    • No Known Problems Daughter    • No Known Problems Maternal Grandmother    • No Known Problems Maternal Grandfather    • No Known Problems Paternal Grandmother    • No Known Problems Paternal Grandfather    • Pancreatic cancer Brother    • No Known Problems Maternal Aunt    • No Known Problems Paternal Aunt    • No Known Problems Paternal Aunt      Social History     Socioeconomic History   • Marital status: /Civil Union     Spouse name: Not on file   • Number of children: Not on file   • Years of education: Not on file   • Highest education level: Not on file   Occupational History   • Not on file   Tobacco Use   • Smoking status: Former     Current packs/day: 0.00     Types: Cigarettes     Quit date:      Years since quittin.8     Passive exposure: Past   • Smokeless tobacco: Never   • Tobacco comments:     30  YEARS AGO    Vaping Use   • Vaping status: Never Used   Substance and Sexual Activity   • Alcohol use: Yes     Alcohol/week: 2.0 standard drinks of alcohol     Types: 2 Glasses of wine per week     Comment: Occassional/social   • Drug use: Never   • Sexual activity: Not Currently     Partners: Female     Birth control/protection: None   Other Topics Concern   • Not on file   Social History  Narrative   • Not on file     Social Determinants of Health     Financial Resource Strain: Low Risk  (10/16/2023)    Overall Financial Resource Strain (CARDIA)    • Difficulty of Paying Living Expenses: Not hard at all   Food Insecurity: Not on file   Transportation Needs: No Transportation Needs (10/16/2023)    PRAPARE - Transportation    • Lack of Transportation (Medical): No    • Lack of Transportation (Non-Medical): No   Physical Activity: Not on file   Stress: Not on file   Social Connections: Not on file   Intimate Partner Violence: Not on file   Housing Stability: Not on file       Current Outpatient Medications:   •  Cholecalciferol (Vitamin D3) 50 MCG (2000 UT) capsule, Take 1 capsule (2,000 Units total) by mouth every morning, Disp: , Rfl:   •  co-enzyme Q-10 50 MG capsule, Take 1 capsule (50 mg total) by mouth every other day, Disp: , Rfl:   •  fluticasone (FLONASE) 50 mcg/act nasal spray, administer 1 spray into each nostril daily (Patient taking differently: 2 sprays into each nostril as needed for rhinitis or allergies), Disp: 48 g, Rfl: 1  •  hydrALAZINE (APRESOLINE) 50 mg tablet, Take 1 tablet (50 mg total) by mouth 3 (three) times a day, Disp: 270 tablet, Rfl: 3  •  hydroquinone 4 % cream, , Disp: , Rfl:   •  lisinopril (ZESTRIL) 20 mg tablet, Take 1 tablet (20 mg total) by mouth 2 (two) times a day, Disp: 180 tablet, Rfl: 3  •  metoprolol tartrate (LOPRESSOR) 100 mg tablet, TAKE ONE TABLET BY MOUTH TWICE A DAY, Disp: 180 tablet, Rfl: 3  •  metroNIDAZOLE (METROCREAM) 0.75 % cream, , Disp: , Rfl:   •  metroNIDAZOLE (METROGEL) 0.75 % gel, , Disp: , Rfl:   •  Multiple Vitamins-Minerals (CENTRUM PO), Take by mouth in the morning, Disp: , Rfl:   •  rosuvastatin (CRESTOR) 20 MG tablet, Take 1 tablet (20 mg total) by mouth 2 (two) times a week Take 1 tablet twice a week, Disp: 51 tablet, Rfl: 0  •  triamcinolone (KENALOG) 0.1 % cream, , Disp: , Rfl:     Current Facility-Administered Medications:   •   cephalexin (KEFLEX) capsule 500 mg, 500 mg, Oral, Q6H Edita BARNETT MD  No Known Allergies  Vitals:    10/24/24 1028   BP: 140/78   Pulse: 75   Resp: 18   Temp: 97.5 °F (36.4 °C)   SpO2: 100%       Physical Exam  Constitutional: General appearance: The Patient is well-developed and well-nourished who appears the stated age in no acute distress. Patient is pleasant and talkative.     HEENT:  Normocephalic.  Sclerae are anicteric. Mucous membranes are moist. Neck is supple without adenopathy. No JVD.     Chest: The lungs are clear to auscultation.     Cardiac: Heart is regular rate.     Abdomen: Abdomen is soft, non-tender, non-distended and without masses.     Extremities: There is no clubbing or cyanosis. There is no edema.  Symmetric.  Neuro: Grossly nonfocal. Gait is normal.     Lymphatic: No evidence of cervical adenopathy bilaterally.   No evidence of axillary adenopathy bilaterally.    Skin: Warm, anicteric.    Psych:  Patient is pleasant and talkative.  Breasts:        Pathology:  [unfilled]    Labs:  Component  Ref Range & Units 10/4/24  8:28 AM 4/10/24  9:37 AM 10/3/23  8:07 AM 3/28/23  8:10 AM 1/6/23 10:10 AM 6/9/22  8:02 AM 12/7/21 10:21 AM   Chromogranin A  0.0 - 101.8 ng/mL 56.5 89.8 CM 72.3 CM 74.0 CM 79.7 CM 70.8 CM 63.9        Imaging  CT chest abdomen pelvis w contrast    Result Date: 10/23/2024  Narrative: CT CHEST, ABDOMEN AND PELVIS WITH IV CONTRAST INDICATION: Z85.9: Personal history of malignant neoplasm, unspecified. History of malignant neuroendocrine tumor. COMPARISON: 4/22/2024; 10/16/2023; 6/17/2022; 11/8/2017 TECHNIQUE: CT examination of the chest, abdomen and pelvis was performed. Multiplanar 2D reformatted images were created from the source data. This examination, like all CT scans performed in the [facility], was performed utilizing techniques to minimize radiation dose exposure, including the use of iterative reconstruction and automated exposure control.  Radiation dose length product (DLP) for this visit: 912.7 mGy-cm IV Contrast: 100 mL of iohexol (OMNIPAQUE) Enteric Contrast: Not administered. FINDINGS: CHEST LUNGS: Lungs are clear. No tracheal or endobronchial lesion. PLEURA: Unremarkable. HEART/GREAT VESSELS: Coronary calcification.. No thoracic aortic aneurysm. MEDIASTINUM AND KAILYN: Precarinal node measures 1.2 cm, image 49, series 2. Small subcarinal node is stable. CHEST WALL AND LOWER NECK: Unremarkable. ABDOMEN LIVER/BILIARY TREE: Circumscribed hypodensity is present in the liver without significant change. GALLBLADDER: No calcified gallstones. No pericholecystic inflammatory change. SPLEEN: Ill-defined hypodensity in the spleen measures 1.6 cm without significant change since 2017. PANCREAS: Unremarkable. ADRENAL GLANDS: Right adrenal nodule 1.2 cm without significant change, image 124. Left adrenal is unremarkable.. KIDNEYS/URETERS: Unremarkable. No hydronephrosis. STOMACH AND BOWEL: The stomach is mildly distended. Nodule appears contiguous with the stomach measuring 1.1 cm on image 115 unchanged and may represent a diverticulum with a small amount of oral contrast. No small bowel dilatation. Anastomotic small bowel segment in the right lower quadrant, image 158 is similar to prior studies without any new masslike thickening. The loop is mildly prominent consistent with postoperative change similar to prior studies. Long segment transverse colon apparent under distention does appear new since the prior study and older exams. Segmental sigmoid under distention similar to the study of April. Some diverticula are noted. APPENDIX: No findings to suggest appendicitis. ABDOMINOPELVIC CAVITY: No ascites. No pneumoperitoneum. No lymphadenopathy. Right lower quadrant mesenteric nodes appear similar to prior studies. VESSELS: Atherosclerosis without abdominal aortic aneurysm. There appears to be some narrowing of the celiac axis without change probably related to  arcuate ligament. The SMA is patent and the RAINER enhances. PELVIS REPRODUCTIVE ORGANS: Enhancing vessel suggest pelvic congestion. The uterus is identified. Small fibroid projects posterior to the lower uterine segment/cervix without change. URINARY BLADDER: Unremarkable. ABDOMINAL WALL/INGUINAL REGIONS: Unremarkable. BONES: No acute fracture or suspicious osseous lesion. There is multilocular cyst/bursa arising from the right hip joint which is multilocular with one extending into the pelvis superiorly. This measures approximately 5.3 cm anterior posterior similar to April. Severe degenerative changes of the right hip are also noted.     Impression: No evidence of metastatic disease of the chest abdomen and pelvis. Postoperative changes of the small bowel. Severe degenerative changes of the right hip with distended cyst/bursa. Long segment apparent wall thickening of the transverse colon is new but most likely related to under distention but should be correlated with any abdominal pain. Stable splenic lesion is most likely benign. Workstation performed: [id number]     I personally reviewed and interpreted the above laboratory and imaging data.

## 2024-10-24 NOTE — PROGRESS NOTES
Surgical Oncology Follow Up       [facility] [place]  [address] [facility] [address]  [phone]    Radha Stephen  1950  [id number]  [facility] [place]  [address] [facility] [address]  [phone]    1. Encounter for follow-up examination after completed treatment for malignant neoplasm  2. 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  She is doing very well.  She is clinically KAREN at 4-1/2  years.  Her adrenal nodule is stable.  Her pheochromocytoma workup was negative.  I suspect this is not related to her neuroendocrine tumor.  This area is stable on her CT.  I do not think she requires any further treatment at this point rather than observation.  There is no evidence of recurrence by physical exam, imaging, symptomatology, or laboratory evaluation.  I will plan on seeing her again in 6 months with a repeat CT and chromogranin level.  If her next imaging and labs are normal, I would likely just repeat her chromogranin yearly.  She is agreeable to this plan.  All her questions were answered.   Orders:  -     CT chest abdomen pelvis w contrast; Future; Expected date: 04/24/2025  -     BUN; Future; Expected date: 04/15/2025  -     Chromogranin A; Future; Expected date: 04/15/2025  -     Creatinine, serum; Future; Expected date: 04/15/2025         Chief Complaint   Patient presents with    office visit       Return in about 6 months (around 4/24/2025) for Ofice visit short, Imaging - See orders, with Sujatha, Labs - See Treatment Plan.      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       - Lymph-vascular and perineural invasion are identified.       - 4 of 13 lymph nodes positive for metastatic well-differentiated neuroendocrine tumor (4/13).       - Largest lymph node deposit: 1.5 cm with extra-emigdio extension, (mesenteric mass).       - 3 mitotic figures per 2 mm2, (consistent with grade G2).       - The tumor is extremely close to the serosal surface (approximately .0003 cm from serosal surface).      Radiation    The patient saw @[facility] for radiation treatment. This is the current list of radiation treatment:  Radiation Treatments       No radiation treatments to show. (Treatments may have been administered in another system.)              Chemotherapy    [No treatment plan]     4/29/2020 -  Cancer Staged    Staging form: Neuroendocrine Tumor - Jejunum/Ileum, AJCC 8th Edition  - Pathologic stage from 4/29/2020: Stage III (pT3, pN1, cM0) - Signed by JAHAIRA Lujan on 10/5/2023  Mitotic count: 3  Histologic grade (G): G2  Histologic grading system: 3 grade system  Lymph-vascular invasion (LVI): LVI present/identified, NOS  Mitotic unit: 10 HPF  Solitary (s) or multifocal (m) tumors in the primary site: Solitary  Clinical status of regional lymph nodes: Metastases confirmed by pathologic examination  Total positive nodes: 4           Staging: [id number] well-differentiated neuroendocrine tumor of the ileum, April 2020  Treatment history:  Small-bowel resection with mesenteric nodes, April 2020  Current treatment:  Observation.   Disease status: KAREN.  Stable benign adrenal nodule. Stable/decreasing right psoas mass.    History of Present Illness: Patient returns in follow-up of her neuroendocrine tumor.  She is doing well at this time with no complaints.  No flushing, diarrhea, palpitations, headaches or sweats.  No change in appetite  or unintentional weight loss.  Her last colonoscopy was a year ago.  CT from October 18, 2024 shows no evidence of metastatic disease.  There are degenerated changes of the right hip.  I personally reviewed the films.  Chromogranin level is normal at 56.5 ng/mL.    Review of Systems  Complete ROS Surg Onc:   Complete ROS Surg Onc:   Constitutional: The patient denies new or recent history of general fatigue, no recent weight loss, no change in appetite.   Eyes: No complaints of visual problems, no scleral icterus.   ENT: no complaints of ear pain, no hoarseness, no difficulty swallowing,  no tinnitus and no new masses in head, oral cavity, or neck.   Cardiovascular: No complaints of chest pain, no palpitations, no ankle edema.   Respiratory: No complaints of shortness of breath, no cough.   Gastrointestinal: No complaints of jaundice, no bloody stools, no pale stools.   Genitourinary: No complaints of dysuria, no hematuria, no nocturia, no frequent urination, no urethral discharge.   Musculoskeletal: No complaints of weakness, paralysis, joint stiffness or arthralgias.  Integumentary: No complaints of rash, no new lesions.   Neurological: No complaints of convulsions, no seizures, no dizziness.   Hematologic/Lymphatic: No complaints of easy bruising.   Endocrine:  No hot or cold intolerance.  No polydipsia, polyphagia, or polyuria.  Allergy/immunology:  No environmental allergies.  No food allergies.  Not immunocompromised.  Skin:  No pallor or rash.  No wound.        Patient Active Problem List   Diagnosis    Colitis    Pure hypercholesterolemia    Encounter for administration of vaccine    Essential hypertension    Stage 3a chronic kidney disease (HCC)    History of malignant neuroendocrine tumor    Adrenal nodule (HCC)    Encounter for follow-up examination after completed treatment for malignant neoplasm    Thickened endometrium    Anxiety about health    Vitamin D deficiency    Class 1 obesity due to excess  calories without serious comorbidity with body mass index (BMI) of 31.0 to 31.9 in adult    Personal history of COVID-19    Arthritis of right hip    Arthritis of right knee    Diverticulosis of colon with hemorrhage    History of endometrial biopsy    Abnormal pelvic ultrasound    Abnormal CT of the abdomen    Iliopsoas bursitis of right hip    Splenic lesion    Hepatic cyst    Retrolisthesis of vertebrae    Encounter for vaccination    History of colonoscopy    Seasonal allergies    Encounter for immunization    Encounter for screening mammogram for breast cancer    Personal history of rectal cancer    Hypertensive kidney disease with chronic kidney disease     Past Medical History:   Diagnosis Date    Allergic 1960    Seasonal    Anxiety 2010    Arthritis     Cancer (HCC)     Colon nodule    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    Colon polyps     COVID-19 virus detected 12/28/2023    Started 12/27/2023, positive 12/28/2023     Diverticulitis of colon     Essential hypertension     Hematuria syndrome     Hypertension     Obesity     Osteoarthritis     Proteinuria     Rectal cancer (HCC) 07/03/2019    Vitamin D deficiency      Past Surgical History:   Procedure Laterality Date    BREAST BIOPSY Right 2017    benign    COLONOSCOPY  12/2018    3 polyps     COLONOSCOPY      several    MAMMO STEREOTACTIC BREAST BIOPSY LEFT (ALL INC) Left 6/18/2020    MAMMO STEREOTACTIC BREAST BIOPSY RIGHT (ALL INC) Right 6/18/2020    TN LAPS ABD PRTM&OMENTUM DX W/WO SPEC BR/WA SPX N/A 3/24/2020    Procedure: 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;  Surgeon: Fabricio Tracey MD;  Location:  MAIN OR;  Service: General    RECTAL BIOPSY N/A 2/9/2018    Procedure: TRANSANAL EXCISION RECTAL POLYP;MUCOSAL POLYPECTOMY;  Surgeon: BETTYE Clifford MD;  Location:  MAIN OR;  Service: Colorectal    SMALL INTESTINE SURGERY N/A 4/29/2020    Procedure: RESECTION SMALL BOWEL;   Surgeon: Janes Marie MD;  Location:  MAIN OR;  Service: Surgical Oncology     Family History   Problem Relation Age of Onset    Hypertension Mother     Heart disease Father     No Known Problems Daughter     No Known Problems Maternal Grandmother     No Known Problems Maternal Grandfather     No Known Problems Paternal Grandmother     No Known Problems Paternal Grandfather     Pancreatic cancer Brother     No Known Problems Maternal Aunt     No Known Problems Paternal Aunt     No Known Problems Paternal Aunt      Social History     Socioeconomic History    Marital status: /Civil Union     Spouse name: Not on file    Number of children: Not on file    Years of education: Not on file    Highest education level: Not on file   Occupational History    Not on file   Tobacco Use    Smoking status: Former     Current packs/day: 0.00     Types: Cigarettes     Quit date:      Years since quittin.8     Passive exposure: Past    Smokeless tobacco: Never    Tobacco comments:     30  YEARS AGO    Vaping Use    Vaping status: Never Used   Substance and Sexual Activity    Alcohol use: Yes     Alcohol/week: 2.0 standard drinks of alcohol     Types: 2 Glasses of wine per week     Comment: Occassional/social    Drug use: Never    Sexual activity: Not Currently     Partners: Female     Birth control/protection: None   Other Topics Concern    Not on file   Social History Narrative    Not on file     Social Determinants of Health     Financial Resource Strain: Low Risk  (10/16/2023)    Overall Financial Resource Strain (CARDIA)     Difficulty of Paying Living Expenses: Not hard at all   Food Insecurity: Not on file   Transportation Needs: No Transportation Needs (10/16/2023)    PRAPARE - Transportation     Lack of Transportation (Medical): No     Lack of Transportation (Non-Medical): No   Physical Activity: Not on file   Stress: Not on file   Social Connections: Not on file   Intimate Partner Violence: Not on file    Housing Stability: Not on file       Current Outpatient Medications:     Cholecalciferol (Vitamin D3) 50 MCG (2000 UT) capsule, Take 1 capsule (2,000 Units total) by mouth every morning, Disp: , Rfl:     co-enzyme Q-10 50 MG capsule, Take 1 capsule (50 mg total) by mouth every other day, Disp: , Rfl:     fluticasone (FLONASE) 50 mcg/act nasal spray, administer 1 spray into each nostril daily (Patient taking differently: 2 sprays into each nostril as needed for rhinitis or allergies), Disp: 48 g, Rfl: 1    hydrALAZINE (APRESOLINE) 50 mg tablet, Take 1 tablet (50 mg total) by mouth 3 (three) times a day, Disp: 270 tablet, Rfl: 3    hydroquinone 4 % cream, , Disp: , Rfl:     lisinopril (ZESTRIL) 20 mg tablet, Take 1 tablet (20 mg total) by mouth 2 (two) times a day, Disp: 180 tablet, Rfl: 3    metoprolol tartrate (LOPRESSOR) 100 mg tablet, TAKE ONE TABLET BY MOUTH TWICE A DAY, Disp: 180 tablet, Rfl: 3    metroNIDAZOLE (METROCREAM) 0.75 % cream, , Disp: , Rfl:     metroNIDAZOLE (METROGEL) 0.75 % gel, , Disp: , Rfl:     Multiple Vitamins-Minerals (CENTRUM PO), Take by mouth in the morning, Disp: , Rfl:     rosuvastatin (CRESTOR) 20 MG tablet, Take 1 tablet (20 mg total) by mouth 2 (two) times a week Take 1 tablet twice a week, Disp: 51 tablet, Rfl: 0    triamcinolone (KENALOG) 0.1 % cream, , Disp: , Rfl:     Current Facility-Administered Medications:     cephalexin (KEFLEX) capsule 500 mg, 500 mg, Oral, Q6H [facility], Edita Pires MD  No Known Allergies  Vitals:    10/24/24 1028   BP: 140/78   Pulse: 75   Resp: 18   Temp: 97.5 °F (36.4 °C)   SpO2: 100%       Physical Exam  Constitutional: General appearance: The Patient is well-developed and well-nourished who appears the stated age in no acute distress. Patient is pleasant and talkative.     HEENT:  Normocephalic.  Sclerae are anicteric. Mucous membranes are moist. Neck is supple without adenopathy. No JVD.     Chest: The lungs are clear to auscultation.     Cardiac:  Heart is regular rate.     Abdomen: Abdomen is soft, non-tender, non-distended and without masses.     Extremities: There is no clubbing or cyanosis. There is no edema.  Symmetric.  Neuro: Grossly nonfocal. Gait is normal.     Lymphatic: No evidence of cervical adenopathy bilaterally.   No evidence of axillary adenopathy bilaterally.    Skin: Warm, anicteric.    Psych:  Patient is pleasant and talkative.  Breasts:        Pathology:  [unfilled]    Labs:  Component  Ref Range & Units 10/4/24  8:28 AM 4/10/24  9:37 AM 10/3/23  8:07 AM 3/28/23  8:10 AM 1/6/23 10:10 AM 6/9/22  8:02 AM 12/7/21 10:21 AM   Chromogranin A  0.0 - 101.8 ng/mL 56.5 89.8 CM 72.3 CM 74.0 CM 79.7 CM 70.8 CM 63.9        Imaging  CT chest abdomen pelvis w contrast    Result Date: 10/23/2024  Narrative: CT CHEST, ABDOMEN AND PELVIS WITH IV CONTRAST INDICATION: Z85.9: Personal history of malignant neoplasm, unspecified. History of malignant neuroendocrine tumor. COMPARISON: 4/22/2024; 10/16/2023; 6/17/2022; 11/8/2017 TECHNIQUE: CT examination of the chest, abdomen and pelvis was performed. Multiplanar 2D reformatted images were created from the source data. This examination, like all CT scans performed in the [facility], was performed utilizing techniques to minimize radiation dose exposure, including the use of iterative reconstruction and automated exposure control. Radiation dose length product (DLP) for this visit: 912.7 mGy-cm IV Contrast: 100 mL of iohexol (OMNIPAQUE) Enteric Contrast: Not administered. FINDINGS: CHEST LUNGS: Lungs are clear. No tracheal or endobronchial lesion. PLEURA: Unremarkable. HEART/GREAT VESSELS: Coronary calcification.. No thoracic aortic aneurysm. MEDIASTINUM AND KAILYN: Precarinal node measures 1.2 cm, image 49, series 2. Small subcarinal node is stable. CHEST WALL AND LOWER NECK: Unremarkable. ABDOMEN LIVER/BILIARY TREE: Circumscribed hypodensity is present in the liver without significant change.  GALLBLADDER: No calcified gallstones. No pericholecystic inflammatory change. SPLEEN: Ill-defined hypodensity in the spleen measures 1.6 cm without significant change since 2017. PANCREAS: Unremarkable. ADRENAL GLANDS: Right adrenal nodule 1.2 cm without significant change, image 124. Left adrenal is unremarkable.. KIDNEYS/URETERS: Unremarkable. No hydronephrosis. STOMACH AND BOWEL: The stomach is mildly distended. Nodule appears contiguous with the stomach measuring 1.1 cm on image 115 unchanged and may represent a diverticulum with a small amount of oral contrast. No small bowel dilatation. Anastomotic small bowel segment in the right lower quadrant, image 158 is similar to prior studies without any new masslike thickening. The loop is mildly prominent consistent with postoperative change similar to prior studies. Long segment transverse colon apparent under distention does appear new since the prior study and older exams. Segmental sigmoid under distention similar to the study of April. Some diverticula are noted. APPENDIX: No findings to suggest appendicitis. ABDOMINOPELVIC CAVITY: No ascites. No pneumoperitoneum. No lymphadenopathy. Right lower quadrant mesenteric nodes appear similar to prior studies. VESSELS: Atherosclerosis without abdominal aortic aneurysm. There appears to be some narrowing of the celiac axis without change probably related to arcuate ligament. The SMA is patent and the RAINER enhances. PELVIS REPRODUCTIVE ORGANS: Enhancing vessel suggest pelvic congestion. The uterus is identified. Small fibroid projects posterior to the lower uterine segment/cervix without change. URINARY BLADDER: Unremarkable. ABDOMINAL WALL/INGUINAL REGIONS: Unremarkable. BONES: No acute fracture or suspicious osseous lesion. There is multilocular cyst/bursa arising from the right hip joint which is multilocular with one extending into the pelvis superiorly. This measures approximately 5.3 cm anterior posterior similar to  April. Severe degenerative changes of the right hip are also noted.     Impression: No evidence of metastatic disease of the chest abdomen and pelvis. Postoperative changes of the small bowel. Severe degenerative changes of the right hip with distended cyst/bursa. Long segment apparent wall thickening of the transverse colon is new but most likely related to under distention but should be correlated with any abdominal pain. Stable splenic lesion is most likely benign. Workstation performed: [id number]     I personally reviewed and interpreted the above laboratory and imaging data.

## 2024-11-12 ENCOUNTER — OFFICE VISIT (OUTPATIENT)
Dept: FAMILY MEDICINE CLINIC | Facility: CLINIC | Age: 74
End: 2024-11-12
Payer: COMMERCIAL

## 2024-11-12 VITALS
SYSTOLIC BLOOD PRESSURE: 130 MMHG | HEART RATE: 62 BPM | DIASTOLIC BLOOD PRESSURE: 86 MMHG | BODY MASS INDEX: 30.65 KG/M2 | TEMPERATURE: 97.2 F | HEIGHT: 68 IN | WEIGHT: 202.2 LBS | OXYGEN SATURATION: 99 %

## 2024-11-12 DIAGNOSIS — Z00.00 MEDICARE ANNUAL WELLNESS VISIT, SUBSEQUENT: Primary | ICD-10-CM

## 2024-11-12 DIAGNOSIS — R45.89 ANXIETY ABOUT HEALTH: Chronic | ICD-10-CM

## 2024-11-12 DIAGNOSIS — N18.31 STAGE 3A CHRONIC KIDNEY DISEASE (HCC): Chronic | ICD-10-CM

## 2024-11-12 DIAGNOSIS — Z23 ENCOUNTER FOR IMMUNIZATION: ICD-10-CM

## 2024-11-12 DIAGNOSIS — Z12.31 ENCOUNTER FOR SCREENING MAMMOGRAM FOR BREAST CANCER: ICD-10-CM

## 2024-11-12 DIAGNOSIS — E55.9 VITAMIN D DEFICIENCY: Chronic | ICD-10-CM

## 2024-11-12 DIAGNOSIS — E66.811 CLASS 1 OBESITY DUE TO EXCESS CALORIES WITHOUT SERIOUS COMORBIDITY WITH BODY MASS INDEX (BMI) OF 31.0 TO 31.9 IN ADULT: Chronic | ICD-10-CM

## 2024-11-12 DIAGNOSIS — I10 ESSENTIAL HYPERTENSION: Chronic | ICD-10-CM

## 2024-11-12 DIAGNOSIS — E78.2 MIXED HYPERLIPIDEMIA: ICD-10-CM

## 2024-11-12 DIAGNOSIS — Z13.1 SCREENING FOR DIABETES MELLITUS: Chronic | ICD-10-CM

## 2024-11-12 DIAGNOSIS — E66.09 CLASS 1 OBESITY DUE TO EXCESS CALORIES WITHOUT SERIOUS COMORBIDITY WITH BODY MASS INDEX (BMI) OF 31.0 TO 31.9 IN ADULT: Chronic | ICD-10-CM

## 2024-11-12 DIAGNOSIS — Z85.048 PERSONAL HISTORY OF RECTAL CANCER: Chronic | ICD-10-CM

## 2024-11-12 DIAGNOSIS — Z85.9 HISTORY OF MALIGNANT NEUROENDOCRINE TUMOR: Chronic | ICD-10-CM

## 2024-11-12 PROBLEM — M70.71 ILIOPSOAS BURSITIS OF RIGHT HIP: Chronic | Status: ACTIVE | Noted: 2022-10-11

## 2024-11-12 PROBLEM — J30.2 SEASONAL ALLERGIES: Chronic | Status: ACTIVE | Noted: 2023-04-14

## 2024-11-12 PROBLEM — Z98.890 HISTORY OF COLONOSCOPY: Chronic | Status: ACTIVE | Noted: 2023-02-22

## 2024-11-12 PROBLEM — R93.5 ABNORMAL CT OF THE ABDOMEN: Chronic | Status: ACTIVE | Noted: 2022-10-11

## 2024-11-12 PROBLEM — R93.89 ABNORMAL PELVIC ULTRASOUND: Chronic | Status: ACTIVE | Noted: 2022-10-11

## 2024-11-12 PROBLEM — E27.9 ADRENAL NODULE (HCC): Chronic | Status: ACTIVE | Noted: 2020-04-15

## 2024-11-12 PROBLEM — K52.9 COLITIS: Chronic | Status: ACTIVE | Noted: 2019-10-05

## 2024-11-12 PROBLEM — Z92.89 HISTORY OF ENDOMETRIAL BIOPSY: Chronic | Status: ACTIVE | Noted: 2022-10-11

## 2024-11-12 PROBLEM — M43.10 RETROLISTHESIS OF VERTEBRAE: Chronic | Status: ACTIVE | Noted: 2022-10-11

## 2024-11-12 PROBLEM — K57.31 DIVERTICULOSIS OF COLON WITH HEMORRHAGE: Chronic | Status: ACTIVE | Noted: 2022-08-08

## 2024-11-12 PROBLEM — D73.89 SPLENIC LESION: Chronic | Status: ACTIVE | Noted: 2022-10-11

## 2024-11-12 PROCEDURE — 90662 IIV NO PRSV INCREASED AG IM: CPT

## 2024-11-12 PROCEDURE — G0008 ADMIN INFLUENZA VIRUS VAC: HCPCS

## 2024-11-12 PROCEDURE — G0439 PPPS, SUBSEQ VISIT: HCPCS | Performed by: NURSE PRACTITIONER

## 2024-11-12 RX ORDER — HYDRALAZINE HYDROCHLORIDE 50 MG/1
50 TABLET, FILM COATED ORAL 3 TIMES DAILY
Qty: 270 TABLET | Refills: 3 | Status: SHIPPED | OUTPATIENT
Start: 2024-11-12

## 2024-11-12 NOTE — ASSESSMENT & PLAN NOTE
Component  Ref Range & Units 10/4/24  8:28 AM 2/9/24 10:04 AM 2/24/21  7:38 AM 5/16/19  7:55 AM 5/4/18  6:57 AM   Vit D, 25-Hydroxy  30.0 - 100.0 ng/mL 63.1 58.2 CM 49.2 30.1 47.10     Patient should continue current medication regimen  We will monitor her labs    Orders:    Vitamin D 25 hydroxy; Future

## 2024-11-12 NOTE — ASSESSMENT & PLAN NOTE
This is a chronic disease process.   The patient is stable at this time.  We discussed diet and exercise.  We discussed a low salt diet.  Will monitor labs.  Continue  Hydralazine  Lisinopril  Metoprolol to tartrate  Crestor    Orders:    hydrALAZINE (APRESOLINE) 50 mg tablet; Take 1 tablet (50 mg total) by mouth 3 (three) times a day

## 2024-11-12 NOTE — PROGRESS NOTES
Ambulatory Visit  Name: Radha Stephen      : 1950      MRN: [id number]  Encounter Provider: JAHAIRA Branham  Encounter Date: 2024   Encounter department: [facility]    Assessment & Plan  Essential hypertension  This is a chronic disease process.   The patient is stable at this time.  We discussed diet and exercise.  We discussed a low salt diet.  Will monitor labs.  Continue  Hydralazine  Lisinopril  Metoprolol to tartrate  Crestor    Orders:    hydrALAZINE (APRESOLINE) 50 mg tablet; Take 1 tablet (50 mg total) by mouth 3 (three) times a day    Stage 3a chronic kidney disease (HCC)  Lab Results   Component Value Date    EGFR 55 10/04/2024    EGFR 60 04/10/2024    EGFR 55 2024    CREATININE 1.01 10/04/2024    CREATININE 0.94 04/10/2024    CREATININE 1.02 2024   This is a chronic and stable disease process  Patient is avoiding nephrotoxic medications         Anxiety about health  Significantly improved        Personal history of rectal cancer         History of malignant neuroendocrine tumor         Mixed hyperlipidemia  Component  Ref Range & Units 10/4/24  8:28 AM 24 10:04 AM 23 10:10 AM 21  7:38 AM 20  7:18 AM 20  7:08 AM 19  7:55 AM   Cholesterol  See Comment mg/dL 171 143   R, CM  131 R,  R, CM   Comment: Cholesterol:        Pediatric <18 Years        Desirable          <170 mg/dL      Borderline High    170-199 mg/dL      High               >=200 mg/dL        Adult >=18 Years           Desirable         <200 mg/dL      Borderline High   200-239 mg/dL      High             >239 mg/dL   Triglycerides  See Comment mg/dL 187 High  112   R,  R,  R,  High  R, CM   Comment: Triglyceride:     0-9Y            <75mg/dL     10Y-17Y         <90 mg/dL       >=18Y     Normal          <150 mg/dL     Borderline High 150-199 mg/dL     High            200-499 mg/dL       Very High       >499  mg/dL    Specimen collection should occur prior to Metamizole administration due to the potential for falsely depressed results.   HDL, Direct  >=50 mg/dL 45 Low  48 Low  49 Low  CM 40 R, CM  45 R, CM 47 R, CM   LDL Calculated  0 - 100 mg/dL 89 73 CM 61 CM 57 CM  56 CM 63 CM   Comment: LDL Cholesterol:    Optimal           <100 mg/dl    Near Optimal      100-129 mg/dl    Above Optimal      Borderline High 130-159 mg/dl      High            160-189 mg/dl      Very High       >189 mg/dl        This is a chronic disease process.   The patient is stable at this time.  We discussed diet and exercise.  Will monitor labs.  Continue  Crestor     Orders:    Lipid panel; Future    Medicare annual wellness visit, subsequent    Orders:    CBC and differential; Future    Comprehensive metabolic panel; Future    Class 1 obesity due to excess calories without serious comorbidity with body mass index (BMI) of 31.0 to 31.9 in adult           Vitamin D deficiency  Component  Ref Range & Units 10/4/24  8:28 AM 2/9/24 10:04 AM 2/24/21  7:38 AM 5/16/19  7:55 AM 5/4/18  6:57 AM   Vit D, 25-Hydroxy  30.0 - 100.0 ng/mL 63.1 58.2 CM 49.2 30.1 47.10     Patient should continue current medication regimen  We will monitor her labs    Orders:    Vitamin D 25 hydroxy; Future    Encounter for screening mammogram for breast cancer    Orders:    Mammo screening bilateral w 3d and cad; Future    Encounter for immunization    Orders:    influenza vaccine, high-dose, PF 0.5 mL (Fluzone High Dose)    Screening for diabetes mellitus    Orders:    Hemoglobin A1C; Future       Preventive health issues were discussed with patient, and age appropriate screening tests were ordered as noted in patient's After Visit Summary. Personalized health advice and appropriate referrals for health education or preventive services given if needed, as noted in patient's After Visit Summary.    History of Present Illness     The patient is here today to discuss her  Medicare annual wellness visit.   I reviewed their medical conditions, medications, laboratory and radiology studies.  I reviewed their scheduled future lab studies with the patient.   The patient's current treatment plan is effective.   There is no change in the current therapy.   I ask the patient to continue their current therapy.   The patient voiced their understanding and agreement.   Follow up in 6 months .  Please continue to the PORCH section of the note for details of today's visit.              Patient Care Team:  JAHAIRA Branham as PCP - General (Internal Medicine)  Janes Marie MD (Oncology)  Fabricio Tracey MD (General Surgery)  Edita Pires MD (Nephrology)  JAHAIRA Perez as Nurse Practitioner (Nephrology)  Marquez Barahona MD (Gastroenterology)  Tamika Hobson PA-C as Physician Assistant (Physician Assistant)    Review of Systems   Constitutional:  Negative for activity change, chills, fatigue and fever.   HENT:  Negative for rhinorrhea and sore throat.    Eyes:  Negative for pain.   Respiratory:  Negative for cough and shortness of breath.    Cardiovascular:  Negative for chest pain, palpitations and leg swelling.   Gastrointestinal:  Negative for abdominal pain, constipation, diarrhea, nausea and vomiting.   Genitourinary:  Negative for difficulty urinating, flank pain, frequency and urgency.   Musculoskeletal:  Negative for gait problem, joint swelling and myalgias.   Skin:  Negative for color change.   Neurological:  Negative for dizziness, weakness, light-headedness and headaches.   Psychiatric/Behavioral:  Negative for sleep disturbance. The patient is not nervous/anxious.    All other systems reviewed and are negative.    Medical History Reviewed by provider this encounter:  Tobacco  Allergies  Meds  Problems  Med Hx  Surg Hx  Fam Hx       Annual Wellness Visit Questionnaire   Radha is here for her Subsequent Wellness visit. Last Medicare Wellness visit information  reviewed, patient interviewed and updates made to the record today.      Health Risk Assessment:   Patient rates overall health as fair. Patient feels that their physical health rating is same. Patient is satisfied with their life. Eyesight was rated as same. Hearing was rated as same. Patient feels that their emotional and mental health rating is same. Patients states they are never, rarely angry. Patient states they are sometimes unusually tired/fatigued. Pain experienced in the last 7 days has been some. Patient's pain rating has been 3/10. Patient states that she has experienced no weight loss or gain in last 6 months.     Depression Screening:   PHQ-2 Score: 0  PHQ-9 Score: 0      Fall Risk Screening:   In the past year, patient has experienced: no history of falling in past year      Urinary Incontinence Screening:   Patient has not leaked urine accidently in the last six months.     Home Safety:  Patient does not have trouble with stairs inside or outside of their home. Patient has working smoke alarms and has no working carbon monoxide detector. Home safety hazards include: none.     Nutrition:   Current diet is Frequent junk food.     Medications:   Patient is currently taking over-the-counter supplements. OTC medications include: see medication list. Patient is able to manage medications.     Activities of Daily Living (ADLs)/Instrumental Activities of Daily Living (IADLs):   Walk and transfer into and out of bed and chair?: Yes  Dress and groom yourself?: Yes    Bathe or shower yourself?: Yes    Feed yourself? Yes  Do your laundry/housekeeping?: Yes  Manage your money, pay your bills and track your expenses?: Yes  Make your own meals?: Yes    Do your own shopping?: Yes    Previous Hospitalizations:   Any hospitalizations or ED visits within the last 12 months?: No      Advance Care Planning:   Living will: No    Durable POA for healthcare: No    Advanced directive: No    Advanced directive counseling  given: Yes    ACP document given: Yes    Patient declined ACP directive: No      PREVENTIVE SCREENINGS      Cardiovascular Screening:    General: Screening Not Indicated and History Lipid Disorder      Diabetes Screening:     General: Screening Current      Colorectal Cancer Screening:     General: Screening Current and History Colorectal Cancer      Breast Cancer Screening:     General: Screening Current      Cervical Cancer Screening:    General: Screening Not Indicated      Lung Cancer Screening:     General: Screening Not Indicated      Hepatitis C Screening:    General: Screening Current    Screening, Brief Intervention, and Referral to Treatment (SBIRT)    Screening  Typical number of drinks in a day: 0  Typical number of drinks in a week: 2  Interpretation: Low risk drinking behavior.    AUDIT-C Screenin) How often did you have a drink containing alcohol in the past year? 2 to 4 times a month  2) How many drinks did you have on a typical day when you were drinking in the past year? 0  3) How often did you have 6 or more drinks on one occasion in the past year? never    AUDIT-C Score: 2  Interpretation: Score 0-2 (female): Negative screen for alcohol misuse    Single Item Drug Screening:  How often have you used an illegal drug (including marijuana) or a prescription medication for non-medical reasons in the past year? never    Single Item Drug Screen Score: 0  Interpretation: Negative screen for possible drug use disorder    Social Determinants of Health     Financial Resource Strain: Low Risk  (10/16/2023)    Overall Financial Resource Strain (CARDIA)     Difficulty of Paying Living Expenses: Not hard at all   Food Insecurity: No Food Insecurity (2024)    Hunger Vital Sign     Worried About Running Out of Food in the Last Year: Never true     Ran Out of Food in the Last Year: Never true   Transportation Needs: No Transportation Needs (2024)    PRAPARE - Transportation     Lack of  "Transportation (Medical): No     Lack of Transportation (Non-Medical): No   Housing Stability: Unknown (11/12/2024)    Housing Stability Vital Sign     Unable to Pay for Housing in the Last Year: No     Homeless in the Last Year: No   Utilities: Not At Risk (11/12/2024)    [facility] Utilities     Threatened with loss of utilities: No     No results found.    Objective     /86 (BP Location: Left arm, Patient Position: Sitting, Cuff Size: Large)   Pulse 62   Temp (!) 97.2 °F (36.2 °C) (Tympanic)   Ht 5' 8\" (1.727 m)   Wt 91.7 kg (202 lb 3.2 oz)   SpO2 99%   BMI 30.74 kg/m²     Physical Exam  Vitals and nursing note reviewed.   Constitutional:       General: She is awake. She is not in acute distress.     Appearance: Normal appearance. She is well-developed.   HENT:      Head: Normocephalic and atraumatic.      Nose: Nose normal.      Mouth/Throat:      Mouth: Mucous membranes are moist.   Eyes:      Conjunctiva/sclera: Conjunctivae normal.   Cardiovascular:      Rate and Rhythm: Normal rate and regular rhythm.      Pulses: Normal pulses.      Heart sounds: Normal heart sounds. No murmur heard.  Pulmonary:      Effort: Pulmonary effort is normal. No respiratory distress.      Breath sounds: Normal breath sounds.   Abdominal:      General: Bowel sounds are normal.      Palpations: Abdomen is soft.      Tenderness: There is no abdominal tenderness.   Musculoskeletal:      Cervical back: Neck supple.      Right lower leg: No edema.      Left lower leg: No edema.   Skin:     General: Skin is warm and dry.   Neurological:      Mental Status: She is alert and oriented to person, place, and time.   Psychiatric:         Attention and Perception: Attention normal.         Mood and Affect: Mood normal.         Speech: Speech normal.         Behavior: Behavior normal. Behavior is cooperative.         Thought Content: Thought content normal.         Cognition and Memory: Cognition normal.         Judgment: Judgment normal. "       Administrative Statements   I have spent a total time of 45 minutes in caring for this patient on the day of the visit/encounter including Diagnostic results, Prognosis, Risks and benefits of tx options, Instructions for management, Patient and family education, Importance of tx compliance, Risk factor reductions, Impressions, Counseling / Coordination of care, Documenting in the medical record, Reviewing / ordering tests, medicine, procedures  , and Obtaining or reviewing history  .

## 2024-11-12 NOTE — ASSESSMENT & PLAN NOTE
Component  Ref Range & Units 10/4/24  8:28 AM 2/9/24 10:04 AM 1/6/23 10:10 AM 2/24/21  7:38 AM 9/17/20  7:18 AM 2/25/20  7:08 AM 5/16/19  7:55 AM   Cholesterol  See Comment mg/dL 171 143   R, CM  131 R,  R, CM   Comment: Cholesterol:        Pediatric <18 Years        Desirable          <170 mg/dL      Borderline High    170-199 mg/dL      High               >=200 mg/dL        Adult >=18 Years           Desirable         <200 mg/dL      Borderline High   200-239 mg/dL      High             >239 mg/dL   Triglycerides  See Comment mg/dL 187 High  112   R,  R,  R,  High  R, CM   Comment: Triglyceride:     0-9Y            <75mg/dL     10Y-17Y         <90 mg/dL       >=18Y     Normal          <150 mg/dL     Borderline High 150-199 mg/dL     High            200-499 mg/dL       Very High       >499 mg/dL    Specimen collection should occur prior to Metamizole administration due to the potential for falsely depressed results.   HDL, Direct  >=50 mg/dL 45 Low  48 Low  49 Low  CM 40 R, CM  45 R, CM 47 R, CM   LDL Calculated  0 - 100 mg/dL 89 73 CM 61 CM 57 CM  56 CM 63 CM   Comment: LDL Cholesterol:    Optimal           <100 mg/dl    Near Optimal      100-129 mg/dl    Above Optimal      Borderline High 130-159 mg/dl      High            160-189 mg/dl      Very High       >189 mg/dl        This is a chronic disease process.   The patient is stable at this time.  We discussed diet and exercise.  Will monitor labs.  Continue  Crestor     Orders:    Lipid panel; Future

## 2024-11-12 NOTE — PATIENT INSTRUCTIONS
_________________________________________________________________  YOU ARE DUE FOR YOUR NEXT MEDICARE ANNUAL WELLNESS VISIT PHYSICAL EXAM AFTER 11/12/2025.  REPEAT LABS ORDERED, PLEASE HAVE THEM DRAWN THE WEEK PRIOR TO YOUR VISIT (APPROXIMATELY 11/5/2025).  LABS HAVE BEEN ORDERED FOR YOU.   THESE LABS SHOULD BE DRAWN PRIOR TO YOUR NEXT VISIT.  YOU CAN HAVE THEM DRAWN UP TO 1 WEEK PRIOR TO YOUR NEXT VISIT.  HAVING YOUR BLOOD WORK WHEN YOU COME TO YOUR NEXT VISIT WILL ALLOW ME TO PROVIDE THE BEST MEDICAL CARE FOR YOU WITHOUT HAVING EITHER OF US PERFORM MORE WORK THAN NECESSARY.  PLEASE BE COMPLIANT WITH THIS REQUEST.   _____________________________________________________________________  Medicare Preventive Visit Patient Instructions  Thank you for completing your Welcome to Medicare Visit or Medicare Annual Wellness Visit today. Your next wellness visit will be due in one year (11/13/2025).  The screening/preventive services that you may require over the next 5-10 years are detailed below. Some tests may not apply to you based off risk factors and/or age. Screening tests ordered at today's visit but not completed yet may show as past due. Also, please note that scanned in results may not display below.  Preventive Screenings:  Service Recommendations Previous Testing/Comments   Colorectal Cancer Screening  * Colonoscopy    * Fecal Occult Blood Test (FOBT)/Fecal Immunochemical Test (FIT)  * Fecal DNA/Cologuard Test  * Flexible Sigmoidoscopy Age: 45-75 years old   Colonoscopy: every 10 years (may be performed more frequently if at higher risk)  OR  FOBT/FIT: every 1 year  OR  Cologuard: every 3 years  OR  Sigmoidoscopy: every 5 years  Screening may be recommended earlier than age 45 if at higher risk for colorectal cancer. Also, an individualized decision between you and your healthcare provider will decide whether screening between the ages of 76-85 would be appropriate. Colonoscopy: 02/21/2023  FOBT/FIT: Not on  file  Cologuard: Not on file  Sigmoidoscopy: Not on file    Screening Current  History Colorectal Cancer     Breast Cancer Screening Age: 40+ years old  Frequency: every 1-2 years  Not required if history of left and right mastectomy Mammogram: 02/01/2024    Screening Current   Cervical Cancer Screening Between the ages of 21-29, pap smear recommended once every 3 years.   Between the ages of 30-65, can perform pap smear with HPV co-testing every 5 years.   Recommendations may differ for women with a history of total hysterectomy, cervical cancer, or abnormal pap smears in past. Pap Smear: 12/08/2020    Screening Not Indicated   Hepatitis C Screening Once for adults born between 1945 and 1965  More frequently in patients at high risk for Hepatitis C Hep C Antibody: 02/09/2024    Screening Current   Diabetes Screening 1-2 times per year if you're at risk for diabetes or have pre-diabetes Fasting glucose: 91 mg/dL (10/4/2024)  A1C: 5.4 % (10/4/2024)  Screening Current   Cholesterol Screening Once every 5 years if you don't have a lipid disorder. May order more often based on risk factors. Lipid panel: 10/04/2024    Screening Not Indicated  History Lipid Disorder     Other Preventive Screenings Covered by Medicare:  Abdominal Aortic Aneurysm (AAA) Screening: covered once if your at risk. You're considered to be at risk if you have a family history of AAA.  Lung Cancer Screening: covers low dose CT scan once per year if you meet all of the following conditions: (1) Age 55-77; (2) No signs or symptoms of lung cancer; (3) Current smoker or have quit smoking within the last 15 years; (4) You have a tobacco smoking history of at least 20 pack years (packs per day multiplied by number of years you smoked); (5) You get a written order from a healthcare provider.  Glaucoma Screening: covered annually if you're considered high risk: (1) You have diabetes OR (2) Family history of glaucoma OR (3)  aged 50 and  older OR (4)  American aged 65 and older  Osteoporosis Screening: covered every 2 years if you meet one of the following conditions: (1) You're estrogen deficient and at risk for osteoporosis based off medical history and other findings; (2) Have a vertebral abnormality; (3) On glucocorticoid therapy for more than 3 months; (4) Have primary hyperparathyroidism; (5) On osteoporosis medications and need to assess response to drug therapy.   Last bone density test (DXA Scan): 05/11/2022.  HIV Screening: covered annually if you're between the age of 15-65. Also covered annually if you are younger than 15 and older than 65 with risk factors for HIV infection. For pregnant patients, it is covered up to 3 times per pregnancy.    Immunizations:  Immunization Recommendations   Influenza Vaccine Annual influenza vaccination during flu season is recommended for all persons aged >= 6 months who do not have contraindications   Pneumococcal Vaccine   * Pneumococcal conjugate vaccine = PCV13 (Prevnar 13), PCV15 (Vaxneuvance), PCV20 (Prevnar 20)  * Pneumococcal polysaccharide vaccine = PPSV23 (Pneumovax) Adults 19-65 yo with certain risk factors or if 65+ yo  If never received any pneumonia vaccine: recommend Prevnar 20 (PCV20)  Give PCV20 if previously received 1 dose of PCV13 or PPSV23   Hepatitis B Vaccine 3 dose series if at intermediate or high risk (ex: diabetes, end stage renal disease, liver disease)   Respiratory syncytial virus (RSV) Vaccine - COVERED BY MEDICARE PART D  * RSVPreF3 (Arexvy) CDC recommends that adults 60 years of age and older may receive a single dose of RSV vaccine using shared clinical decision-making (SCDM)   Tetanus (Td) Vaccine - COST NOT COVERED BY MEDICARE PART B Following completion of primary series, a booster dose should be given every 10 years to maintain immunity against tetanus. Td may also be given as tetanus wound prophylaxis.   Tdap Vaccine - COST NOT COVERED BY MEDICARE PART B  Recommended at least once for all adults. For pregnant patients, recommended with each pregnancy.   Shingles Vaccine (Shingrix) - COST NOT COVERED BY MEDICARE PART B  2 shot series recommended in those 19 years and older who have or will have weakened immune systems or those 50 years and older     Health Maintenance Due:      Topic Date Due   • Breast Cancer Screening: Mammogram  02/01/2025   • Colorectal Cancer Screening  02/18/2033   • Hepatitis C Screening  Completed     Immunizations Due:      Topic Date Due   • Influenza Vaccine (1) 09/01/2024   • COVID-19 Vaccine (3 - 2023-24 season) 09/01/2024     Advance Directives   What are advance directives?  Advance directives are legal documents that state your wishes and plans for medical care. These plans are made ahead of time in case you lose your ability to make decisions for yourself. Advance directives can apply to any medical decision, such as the treatments you want, and if you want to donate organs.   What are the types of advance directives?  There are many types of advance directives, and each state has rules about how to use them. You may choose a combination of any of the following:  Living will:  This is a written record of the treatment you want. You can also choose which treatments you do not want, which to limit, and which to stop at a certain time. This includes surgery, medicine, IV fluid, and tube feedings.   Durable power of  for healthcare (DPAHC):  This is a written record that states who you want to make healthcare choices for you when you are unable to make them for yourself. This person, called a proxy, is usually a family member or a friend. You may choose more than 1 proxy.  Do not resuscitate (DNR) order:  A DNR order is used in case your heart stops beating or you stop breathing. It is a request not to have certain forms of treatment, such as CPR. A DNR order may be included in other types of advance directives.  Medical  directive:  This covers the care that you want if you are in a coma, near death, or unable to make decisions for yourself. You can list the treatments you want for each condition. Treatment may include pain medicine, surgery, blood transfusions, dialysis, IV or tube feedings, and a ventilator (breathing machine).  Values history:  This document has questions about your views, beliefs, and how you feel and think about life. This information can help others choose the care that you would choose.  Why are advance directives important?  An advance directive helps you control your care. Although spoken wishes may be used, it is better to have your wishes written down. Spoken wishes can be misunderstood, or not followed. Treatments may be given even if you do not want them. An advance directive may make it easier for your family to make difficult choices about your care.   Weight Management   Why it is important to manage your weight:  Being overweight increases your risk of health conditions such as heart disease, high blood pressure, type 2 diabetes, and certain types of cancer. It can also increase your risk for osteoarthritis, sleep apnea, and other respiratory problems. Aim for a slow, steady weight loss. Even a small amount of weight loss can lower your risk of health problems.  How to lose weight safely:  A safe and healthy way to lose weight is to eat fewer calories and get regular exercise. You can lose up about 1 pound a week by decreasing the number of calories you eat by 500 calories each day.   Healthy meal plan for weight management:  A healthy meal plan includes a variety of foods, contains fewer calories, and helps you stay healthy. A healthy meal plan includes the following:  Eat whole-grain foods more often.  A healthy meal plan should contain fiber. Fiber is the part of grains, fruits, and vegetables that is not broken down by your body. Whole-grain foods are healthy and provide extra fiber in your  diet. Some examples of whole-grain foods are whole-wheat breads and pastas, oatmeal, brown rice, and bulgur.  Eat a variety of vegetables every day.  Include dark, leafy greens such as spinach, kale, khanh greens, and mustard greens. Eat yellow and orange vegetables such as carrots, sweet potatoes, and winter squash.   Eat a variety of fruits every day.  Choose fresh or canned fruit (canned in its own juice or light syrup) instead of juice. Fruit juice has very little or no fiber.  Eat low-fat dairy foods.  Drink fat-free (skim) milk or 1% milk. Eat fat-free yogurt and low-fat cottage cheese. Try low-fat cheeses such as mozzarella and other reduced-fat cheeses.  Choose meat and other protein foods that are low in fat.  Choose beans or other legumes such as split peas or lentils. Choose fish, skinless poultry (chicken or turkey), or lean cuts of red meat (beef or pork). Before you cook meat or poultry, cut off any visible fat.   Use less fat and oil.  Try baking foods instead of frying them. Add less fat, such as margarine, sour cream, regular salad dressing and mayonnaise to foods. Eat fewer high-fat foods. Some examples of high-fat foods include french fries, doughnuts, ice cream, and cakes.  Eat fewer sweets.  Limit foods and drinks that are high in sugar. This includes candy, cookies, regular soda, and sweetened drinks.  Exercise:  Exercise at least 30 minutes per day on most days of the week. Some examples of exercise include walking, biking, dancing, and swimming. You can also fit in more physical activity by taking the stairs instead of the elevator or parking farther away from stores. Ask your healthcare provider about the best exercise plan for you.      © Copyright [employer] 2018 Information is for End User's use only and may not be sold, redistributed or otherwise used for commercial purposes. All illustrations and images included in CareNotes® are the copyrighted property of [employer] or [employer]  [facility]

## 2025-01-10 NOTE — PROGRESS NOTES
Virtual Brief Visit    Reason for visit: The patient is concerned for COVID exposure therefore did not want to present to office for face-to-face visit  The patient was informed that this is a billable visit and they are agreeable  It was my intention to perform this visit via video technology but the patient was not able to do a video connection therefore the visit was completed via audio telephone only  I spent  25 inutes with the patient and >50% was spent in counseling/coordination of care    Assessment/Plan:    Problem List Items Addressed This Visit        Cardiovascular and Mediastinum    Hypertensive urgency     Blood pressure currently under control with polypharmacy  She is monitoring home BP         Essential hypertension       Immune and Lymphatic    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 [facility])     Following with oncology soon for another scan            Genitourinary    Stage 3 chronic kidney disease     Lab Results   Component Value Date    EGFR 55 02/24/2021    EGFR 51 09/17/2020    EGFR 64 05/02/2020    CREATININE 1 04 02/24/2021    CREATININE 1 11 09/17/2020    CREATININE 0 92 05/02/2020   Kidney function is stable with a creatinine of1 3-1 5 mg/dl   She is nonoliguric and avoids nephrotoxins  List sent of safe OTC meds to take            Other    History of malignant neuroendocrine tumor      Other Visit Diagnoses     Sinusitis, unspecified chronicity, unspecified location    -  Primary    Relevant Medications    cephalexin (KEFLEX) capsule 500 mg (Start on 2/25/2021 12:00 PM)    fluticasone (FLONASE) 50 mcg/act nasal spray                Reason for visit is   Chief Complaint   Patient presents with    Virtual Brief Visit        Encounter provider Chas Miguel MD    Provider located at [facility] [id number] [address] [phone]    Recent Visits  No visits were found meeting these conditions  Showing recent visits within past 7 days and meeting all other requirements     Today's Visits  Date Type Provider Dept   02/25/21 Telemedicine Viki Ventura MD [facility] 76 today's visits and meeting all other requirements     Future Appointments  No visits were found meeting these conditions  Showing future appointments within next 150 days and meeting all other requirements        After connecting through [employer] and patient was informed that this is a secure, HIPAA-compliant platform  She agrees to proceed  , the patient was identified by name and date of birth  Pamela Valderrama was informed that this is a telemedicine visit and that the visit is being conducted through telephone  My office door was closed  No one else was in the room  She acknowledged consent and understanding of privacy and security of the platform  The patient has agreed to participate and understands she can discontinue the visit at any time  Patient is aware this is a billable service  Antionette Youngblood is a 72 y o  female with CKD 3 with a history of hypertensive urgency  BP is much better controlled and she is having readings in the 120-130 range at home  She lost 3 lbs of weight  She thinks she has a sinus infection with burning of eyes and nose  Mellissa Louis   HPI     Past Medical History:   Diagnosis Date    Abnormal weight gain     Anxiety     Benign essential hypertension     Chronic kidney disease, stage 3     Colon polyps     Essential hypertension     Hematuria syndrome     Hyperlipidemia     Hypertension     Morbid obesity (HCC)     Osteoarthritis     Proteinuria     Vitamin D deficiency        Past Surgical History:   Procedure Laterality Date    BREAST BIOPSY Right 2017    benign    COLONOSCOPY  12/2018    3 polyps     COLONOSCOPY      several    MAMMO STEREOTACTIC BREAST BIOPSY LEFT (ALL INC) Left 6/18/2020    MAMMO STEREOTACTIC BREAST BIOPSY RIGHT (ALL INC) Right 6/18/2020    TN LAP,DIAGNOSTIC ABDOMEN N/A 3/24/2020    Procedure: 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;  Surgeon: Javon Morataya MD;  Location: [facility] MAIN OR;  Service: General    RECTAL BIOPSY N/A 2/9/2018    Procedure: TRANSANAL EXCISION RECTAL POLYP;MUCOSAL POLYPECTOMY;  Surgeon: Farideh Joy MD;  Location: [facility] MAIN OR;  Service: Colorectal    SMALL INTESTINE SURGERY N/A 4/29/2020    Procedure: RESECTION SMALL BOWEL;  Surgeon: Tressia Pallas, MD;  Location: [facility] MAIN OR;  Service: Surgical Oncology       Current Outpatient Medications   Medication Sig Dispense Refill    Cholecalciferol (VITAMIN D3) 50 MCG (2000 UT) capsule Take 2,000 Units by mouth every morning       co-enzyme Q-10 50 MG capsule Take 50 mg by mouth every other day      hydrALAZINE (APRESOLINE) 50 mg tablet Take 1 tablet (50 mg total) by mouth 3 (three) times a day 90 tablet 3    lisinopril (ZESTRIL) 20 mg tablet Take 1 tablet (20 mg total) by mouth 2 (two) times a day 180 tablet 3    metoprolol tartrate (LOPRESSOR) 100 mg tablet Take 1 tablet by mouth twice daily 180 tablet 3    rosuvastatin (CRESTOR) 20 MG tablet Take 1 tablet (20 mg total) by mouth every other day 90 tablet 3    fluticasone (FLONASE) 50 mcg/act nasal spray 1 spray into each nostril daily 1 Bottle 3     Current Facility-Administered Medications   Medication Dose Route Frequency Provider Last Rate Last Admin    cephalexin (KEFLEX) capsule 500 mg  500 mg Oral Q6H Arelis Cesar MD            No Known Allergies    Review of Systems   Constitutional: Negative for chills, fatigue and fever  HENT: Negative for hearing loss  Eyes: Positive for pain  Burning sensation and eye puffiness   Respiratory: Negative for cough and shortness of breath  Cardiovascular: Negative for chest pain, palpitations and leg swelling     Gastrointestinal: Negative for abdominal pain, blood in stool, constipation, diarrhea, nausea and vomiting  Genitourinary: Negative for difficulty urinating, dysuria, frequency and urgency  Musculoskeletal: Negative for arthralgias and back pain  Has morning discomfort   Allergic/Immunologic: Positive for environmental allergies  Neurological: Negative for dizziness and headaches  Hematological: Does not bruise/bleed easily  Psychiatric/Behavioral: Negative for dysphoric mood  Vit D 49 2 GFR 55 creat 1 04  CBC WNL platelets slightly elev at 415K  PTH and phos WNL     Vitals:    02/25/21 0904   BP: 123/82   Weight: 93 4 kg (206 lb)   Height: 5' 8" (1 727 m)   conversant and alert and appropriate   NAD        I spent 25 minutes directly with the patient during this visit    VIRTUAL VISIT [address]  acknowledges that she has consented to an online visit or consultation  She understands that the online visit is based solely on information provided by her, and that, in the absence of a face-to-face physical evaluation by the physician, the diagnosis she receives is both limited and provisional in terms of accuracy and completeness  This is not intended to replace a full medical face-to-face evaluation by the physician  Ronny Spears understands and accepts these terms  done

## 2025-02-14 ENCOUNTER — VBI (OUTPATIENT)
Dept: ADMINISTRATIVE | Facility: OTHER | Age: 75
End: 2025-02-14

## 2025-02-14 NOTE — TELEPHONE ENCOUNTER
02/14/25 8:44 AM     Chart reviewed for Mammogram was/were not submitted to the patient's insurance.     Medina Corbin MA   [facility]

## 2025-02-28 DIAGNOSIS — I10 ESSENTIAL HYPERTENSION: ICD-10-CM

## 2025-02-28 RX ORDER — LISINOPRIL 20 MG/1
20 TABLET ORAL 2 TIMES DAILY
Qty: 180 TABLET | Refills: 0 | Status: SHIPPED | OUTPATIENT
Start: 2025-02-28

## 2025-03-13 DIAGNOSIS — E78.00 PURE HYPERCHOLESTEROLEMIA: ICD-10-CM

## 2025-03-13 RX ORDER — ROSUVASTATIN CALCIUM 20 MG/1
TABLET, COATED ORAL
Qty: 51 TABLET | Refills: 0 | Status: SHIPPED | OUTPATIENT
Start: 2025-03-13

## 2025-03-17 ENCOUNTER — HOSPITAL ENCOUNTER (OUTPATIENT)
Dept: MAMMOGRAPHY | Facility: HOSPITAL | Age: 75
Discharge: HOME | End: 2025-03-17
Payer: COMMERCIAL

## 2025-03-17 DIAGNOSIS — Z12.31 ENCOUNTER FOR SCREENING MAMMOGRAM FOR BREAST CANCER: ICD-10-CM

## 2025-03-17 PROCEDURE — 77067 SCR MAMMO BI INCL CAD: CPT

## 2025-03-17 PROCEDURE — 77063 BREAST TOMOSYNTHESIS BI: CPT

## 2025-04-01 ENCOUNTER — RESULTS FOLLOW-UP (OUTPATIENT)
Dept: FAMILY MEDICINE CLINIC | Facility: CLINIC | Age: 75
End: 2025-04-01

## 2025-04-11 ENCOUNTER — APPOINTMENT (OUTPATIENT)
Dept: LAB | Facility: CLINIC | Age: 75
End: 2025-04-11
Attending: SURGERY
Payer: COMMERCIAL

## 2025-04-11 DIAGNOSIS — E55.9 VITAMIN D DEFICIENCY: Chronic | ICD-10-CM

## 2025-04-11 DIAGNOSIS — Z00.00 MEDICARE ANNUAL WELLNESS VISIT, SUBSEQUENT: ICD-10-CM

## 2025-04-11 DIAGNOSIS — E78.2 MIXED HYPERLIPIDEMIA: ICD-10-CM

## 2025-04-11 DIAGNOSIS — Z13.1 SCREENING FOR DIABETES MELLITUS: Chronic | ICD-10-CM

## 2025-04-11 DIAGNOSIS — Z85.9 HISTORY OF MALIGNANT NEUROENDOCRINE TUMOR: ICD-10-CM

## 2025-04-11 LAB
25(OH)D3 SERPL-MCNC: 44.6 NG/ML (ref 30–100)
ALBUMIN SERPL BCG-MCNC: 4.2 G/DL (ref 3.5–5)
ALP SERPL-CCNC: 52 U/L (ref 34–104)
ALT SERPL W P-5'-P-CCNC: 10 U/L (ref 7–52)
ANION GAP SERPL CALCULATED.3IONS-SCNC: 8 MMOL/L (ref 4–13)
AST SERPL W P-5'-P-CCNC: 14 U/L (ref 13–39)
BASOPHILS # BLD AUTO: 0.03 THOUSANDS/ÂΜL (ref 0–0.1)
BASOPHILS NFR BLD AUTO: 1 % (ref 0–1)
BILIRUB SERPL-MCNC: 0.52 MG/DL (ref 0.2–1)
BUN SERPL-MCNC: 14 MG/DL (ref 5–25)
CALCIUM SERPL-MCNC: 9.1 MG/DL (ref 8.4–10.2)
CHLORIDE SERPL-SCNC: 103 MMOL/L (ref 96–108)
CHOLEST SERPL-MCNC: 138 MG/DL (ref ?–200)
CO2 SERPL-SCNC: 28 MMOL/L (ref 21–32)
CREAT SERPL-MCNC: 0.94 MG/DL (ref 0.6–1.3)
EOSINOPHIL # BLD AUTO: 0.07 THOUSAND/ÂΜL (ref 0–0.61)
EOSINOPHIL NFR BLD AUTO: 1 % (ref 0–6)
ERYTHROCYTE [DISTWIDTH] IN BLOOD BY AUTOMATED COUNT: 13.2 % (ref 11.6–15.1)
EST. AVERAGE GLUCOSE BLD GHB EST-MCNC: 114 MG/DL
GFR SERPL CREATININE-BSD FRML MDRD: 60 ML/MIN/1.73SQ M
GLUCOSE P FAST SERPL-MCNC: 89 MG/DL (ref 65–99)
HBA1C MFR BLD: 5.6 %
HCT VFR BLD AUTO: 37.3 % (ref 34.8–46.1)
HDLC SERPL-MCNC: 41 MG/DL
HGB BLD-MCNC: 12.3 G/DL (ref 11.5–15.4)
IMM GRANULOCYTES # BLD AUTO: 0.02 THOUSAND/UL (ref 0–0.2)
IMM GRANULOCYTES NFR BLD AUTO: 0 % (ref 0–2)
LDLC SERPL CALC-MCNC: 67 MG/DL (ref 0–100)
LYMPHOCYTES # BLD AUTO: 2.57 THOUSANDS/ÂΜL (ref 0.6–4.47)
LYMPHOCYTES NFR BLD AUTO: 42 % (ref 14–44)
MCH RBC QN AUTO: 29.8 PG (ref 26.8–34.3)
MCHC RBC AUTO-ENTMCNC: 33 G/DL (ref 31.4–37.4)
MCV RBC AUTO: 90 FL (ref 82–98)
MONOCYTES # BLD AUTO: 0.51 THOUSAND/ÂΜL (ref 0.17–1.22)
MONOCYTES NFR BLD AUTO: 8 % (ref 4–12)
NEUTROPHILS # BLD AUTO: 3 THOUSANDS/ÂΜL (ref 1.85–7.62)
NEUTS SEG NFR BLD AUTO: 48 % (ref 43–75)
NONHDLC SERPL-MCNC: 97 MG/DL
NRBC BLD AUTO-RTO: 0 /100 WBCS
PLATELET # BLD AUTO: 361 THOUSANDS/UL (ref 149–390)
PMV BLD AUTO: 9.2 FL (ref 8.9–12.7)
POTASSIUM SERPL-SCNC: 3.9 MMOL/L (ref 3.5–5.3)
PROT SERPL-MCNC: 7 G/DL (ref 6.4–8.4)
RBC # BLD AUTO: 4.13 MILLION/UL (ref 3.81–5.12)
SODIUM SERPL-SCNC: 139 MMOL/L (ref 135–147)
TRIGL SERPL-MCNC: 148 MG/DL (ref ?–150)
WBC # BLD AUTO: 6.2 THOUSAND/UL (ref 4.31–10.16)

## 2025-04-11 PROCEDURE — 80053 COMPREHEN METABOLIC PANEL: CPT

## 2025-04-11 PROCEDURE — 82306 VITAMIN D 25 HYDROXY: CPT

## 2025-04-11 PROCEDURE — 86316 IMMUNOASSAY TUMOR OTHER: CPT

## 2025-04-11 PROCEDURE — 36415 COLL VENOUS BLD VENIPUNCTURE: CPT

## 2025-04-11 PROCEDURE — 85025 COMPLETE CBC W/AUTO DIFF WBC: CPT

## 2025-04-11 PROCEDURE — 83036 HEMOGLOBIN GLYCOSYLATED A1C: CPT

## 2025-04-11 PROCEDURE — 80061 LIPID PANEL: CPT

## 2025-04-14 LAB — CGA SERPL-MCNC: 65.2 NG/ML (ref 0–101.8)

## 2025-04-24 ENCOUNTER — HOSPITAL ENCOUNTER (OUTPATIENT)
Dept: CT IMAGING | Facility: HOSPITAL | Age: 75
End: 2025-04-24
Attending: SURGERY
Payer: COMMERCIAL

## 2025-04-24 DIAGNOSIS — Z85.9 HISTORY OF MALIGNANT NEUROENDOCRINE TUMOR: ICD-10-CM

## 2025-04-24 PROCEDURE — 74177 CT ABD & PELVIS W/CONTRAST: CPT

## 2025-04-24 PROCEDURE — 71260 CT THORAX DX C+: CPT

## 2025-04-24 RX ADMIN — IOHEXOL 100 ML: 350 INJECTION, SOLUTION INTRAVENOUS at 06:47

## 2025-05-05 ENCOUNTER — RA CDI HCC (OUTPATIENT)
Dept: OTHER | Facility: HOSPITAL | Age: 75
End: 2025-05-05

## 2025-05-05 ENCOUNTER — OFFICE VISIT (OUTPATIENT)
Dept: SURGICAL ONCOLOGY | Facility: CLINIC | Age: 75
End: 2025-05-05
Payer: COMMERCIAL

## 2025-05-05 VITALS
DIASTOLIC BLOOD PRESSURE: 86 MMHG | BODY MASS INDEX: 30.52 KG/M2 | HEART RATE: 68 BPM | TEMPERATURE: 97.3 F | WEIGHT: 201.4 LBS | HEIGHT: 68 IN | OXYGEN SATURATION: 99 % | SYSTOLIC BLOOD PRESSURE: 140 MMHG

## 2025-05-05 DIAGNOSIS — Z08 ENCOUNTER FOR FOLLOW-UP EXAMINATION AFTER COMPLETED TREATMENT FOR MALIGNANT NEOPLASM: Primary | ICD-10-CM

## 2025-05-05 DIAGNOSIS — Z85.9 HISTORY OF MALIGNANT NEUROENDOCRINE TUMOR: Chronic | ICD-10-CM

## 2025-05-05 PROCEDURE — G2211 COMPLEX E/M VISIT ADD ON: HCPCS

## 2025-05-05 PROCEDURE — 99213 OFFICE O/P EST LOW 20 MIN: CPT

## 2025-05-05 NOTE — ASSESSMENT & PLAN NOTE
The patient is doing very well, and there is no evidence of disease recurrence by imaging. Her tumor marker remains low.  We will see her again in 1 year with repeat CT and chromogranin A level.  Orders:  •  CT chest abdomen pelvis w contrast; Future  •  BUN; Future  •  Creatinine, serum; Future  •  Chromogranin A; Future

## 2025-05-05 NOTE — PROGRESS NOTES
HCC coding opportunities    I12.9 for [facility]     Chart Reviewed number of suggestions sent to Provider: 1     Patients Insurance     Medicare Insurance: [employer] Medicare Advantage

## 2025-05-05 NOTE — PROGRESS NOTES
Name: Radha Stephen      : 1950      MRN: [id number]  Encounter Provider: JAHAIRA Lujan  Encounter Date: 2025   Encounter department: [facility] [place]  :  Assessment & Plan  Encounter for follow-up examination after completed treatment for malignant neoplasm         History of malignant neuroendocrine tumor  The patient is doing very well, and there is no evidence of disease recurrence by imaging. Her tumor marker remains low.  We will see her again in 1 year with repeat CT and chromogranin A level.  Orders:  •  CT chest abdomen pelvis w contrast; Future  •  BUN; Future  •  Creatinine, serum; Future  •  Chromogranin A; Future        History of Present Illness   Chief Complaint   Patient presents with   • Follow-up     Return in about 1 year (around 2026) for Office visit with Dr Marie, Imaging - See orders, Labs - See Treatment Plan.    Pertinent Medical History   This is a 73 y/o female who returns to the office today in follow-up for her history of a small bowel neuroendocrine tumor.  She is currently KAREN at 6 years, and reports she feels well. She denies any abdominal pain, N/V, diarrhea, or unintentional weight loss. She denies flushing, sweating or palpitations.   CT scan was performed on , and a chromogranin A level has been drawn. I have reviewed these results with her today, and discussed the plan for continued surveillance.       Oncology History   Cancer Staging   History of malignant neuroendocrine tumor  Staging form: Neuroendocrine Tumor - Jejunum/Ileum, AJCC 8th Edition  - Pathologic stage from 2020: Stage III (pT3, pN1, cM0) - Signed by JAHAIRA Lujan on 10/5/2023  Mitotic count: 3  Histologic grade (G): G2  Histologic grading system: 3 grade system  Lymph-vascular invasion (LVI): LVI present/identified, NOS  Mitotic unit: 10 HPF  Solitary (s) or multifocal (m) tumors in the primary site: Solitary  Clinical  status of regional lymph nodes: Metastases confirmed by pathologic examination  Total positive nodes: 4  Oncology History   History of malignant neuroendocrine tumor   3/24/2020 Initial Diagnosis    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       - Lymph-vascular and perineural invasion are identified.       - 4 of 13 lymph nodes positive for metastatic well-differentiated neuroendocrine tumor (4/13).       - Largest lymph node deposit: 1.5 cm with extra-emigdio extension, (mesenteric mass).       - 3 mitotic figures per 2 mm2, (consistent with grade G2).       - The tumor is extremely close to the serosal surface (approximately .0003 cm from serosal surface).     4/29/2020 -  Cancer Staged    Staging form: Neuroendocrine Tumor - Jejunum/Ileum, AJCC 8th Edition  - Pathologic stage from 4/29/2020: Stage III (pT3, pN1, cM0) - Signed by JAHAIRA Lujan on 10/5/2023  Mitotic count: 3  Histologic grade (G): G2  Histologic grading system: 3 grade system  Lymph-vascular invasion (LVI): LVI present/identified, NOS  Mitotic unit: 10 HPF  Solitary (s) or multifocal (m) tumors in the primary site: Solitary  Clinical status of regional lymph nodes: Metastases confirmed by pathologic examination  Total positive nodes: 4            Review of Systems A complete review of systems is negative other than that noted above in the HPI.       Current Outpatient Medications   Medication Sig Dispense Refill   • Cholecalciferol (Vitamin D3) 50 MCG (2000 UT) capsule Take 1 capsule (2,000 Units total) by mouth every morning     • co-enzyme Q-10 50 MG capsule Take 1 capsule (50 mg total) by mouth every other day     • fluticasone (FLONASE) 50 mcg/act nasal spray administer 1 spray into each nostril daily (Patient taking differently: 2 sprays into each nostril as needed for rhinitis or  "allergies) 48 g 1   • hydrALAZINE (APRESOLINE) 50 mg tablet Take 1 tablet (50 mg total) by mouth 3 (three) times a day 270 tablet 3   • hydroquinone 4 % cream      • lisinopril (ZESTRIL) 20 mg tablet Take 1 tablet (20 mg total) by mouth 2 (two) times a day 180 tablet 0   • metoprolol tartrate (LOPRESSOR) 100 mg tablet TAKE ONE TABLET BY MOUTH TWICE A  tablet 3   • metroNIDAZOLE (METROCREAM) 0.75 % cream      • metroNIDAZOLE (METROGEL) 0.75 % gel      • Multiple Vitamins-Minerals (CENTRUM PO) Take by mouth in the morning     • rosuvastatin (CRESTOR) 20 MG tablet Take 1 tablet (20 mg total) by mouth 2 (two) times a week 51 tablet 0   • triamcinolone (KENALOG) 0.1 % cream        Current Facility-Administered Medications   Medication Dose Route Frequency Provider Last Rate Last Admin   • cephalexin (KEFLEX) capsule 500 mg  500 mg Oral Q6H ANIBAL Edita Pires MD          Objective   /86   Pulse 68   Temp (!) 97.3 °F (36.3 °C)   Ht 5' 8\" (1.727 m)   Wt 91.4 kg (201 lb 6.4 oz)   SpO2 99%   BMI 30.62 kg/m²     Pain Screening:  Pain Score: 0-No pain  ECOG    Physical Exam  Vitals reviewed.   Constitutional:       General: She is not in acute distress.     Appearance: Normal appearance. She is not ill-appearing or toxic-appearing.   HENT:      Head: Normocephalic and atraumatic.   Eyes:      General: No scleral icterus.  Cardiovascular:      Rate and Rhythm: Normal rate.   Pulmonary:      Effort: Pulmonary effort is normal.   Abdominal:      General: Abdomen is flat. A surgical scar is present. There is no distension.      Palpations: Abdomen is soft. There is no hepatomegaly or mass.      Tenderness: There is no abdominal tenderness. There is no guarding.   Musculoskeletal:         General: Normal range of motion.      Cervical back: Normal range of motion and neck supple.   Skin:     General: Skin is warm and dry.   Neurological:      General: No focal deficit present.      Mental Status: She is alert and " oriented to person, place, and time.   Psychiatric:         Mood and Affect: Mood normal.         Behavior: Behavior normal.         Thought Content: Thought content normal.         Judgment: Judgment normal.           Labs: I have reviewed pertinent labs.   Results for orders placed or performed in visit on 04/11/25   Result Value Ref Range    Chromogranin A 65.2 0.0 - 101.8 ng/mL   CBC and differential   Result Value Ref Range    WBC 6.20 4.31 - 10.16 Thousand/uL    RBC 4.13 3.81 - 5.12 Million/uL    Hemoglobin 12.3 11.5 - 15.4 g/dL    Hematocrit 37.3 34.8 - 46.1 %    MCV 90 82 - 98 fL    MCH 29.8 26.8 - 34.3 pg    MCHC 33.0 31.4 - 37.4 g/dL    RDW 13.2 11.6 - 15.1 %    MPV 9.2 8.9 - 12.7 fL    Platelets 361 149 - 390 Thousands/uL    nRBC 0 /100 WBCs    Segmented % 48 43 - 75 %    Immature Grans % 0 0 - 2 %    Lymphocytes % 42 14 - 44 %    Monocytes % 8 4 - 12 %    Eosinophils Relative 1 0 - 6 %    Basophils Relative 1 0 - 1 %    Absolute Neutrophils 3.00 1.85 - 7.62 Thousands/µL    Absolute Immature Grans 0.02 0.00 - 0.20 Thousand/uL    Absolute Lymphocytes 2.57 0.60 - 4.47 Thousands/µL    Absolute Monocytes 0.51 0.17 - 1.22 Thousand/µL    Eosinophils Absolute 0.07 0.00 - 0.61 Thousand/µL    Basophils Absolute 0.03 0.00 - 0.10 Thousands/µL   Comprehensive metabolic panel   Result Value Ref Range    Sodium 139 135 - 147 mmol/L    Potassium 3.9 3.5 - 5.3 mmol/L    Chloride 103 96 - 108 mmol/L    CO2 28 21 - 32 mmol/L    ANION GAP 8 4 - 13 mmol/L    BUN 14 5 - 25 mg/dL    Creatinine 0.94 0.60 - 1.30 mg/dL    Glucose, Fasting 89 65 - 99 mg/dL    Calcium 9.1 8.4 - 10.2 mg/dL    AST 14 13 - 39 U/L    ALT 10 7 - 52 U/L    Alkaline Phosphatase 52 34 - 104 U/L    Total Protein 7.0 6.4 - 8.4 g/dL    Albumin 4.2 3.5 - 5.0 g/dL    Total Bilirubin 0.52 0.20 - 1.00 mg/dL    eGFR 60 ml/min/1.73sq m   Hemoglobin A1C   Result Value Ref Range    Hemoglobin A1C 5.6 Normal 4.0-5.6%; PreDiabetic 5.7-6.4%; Diabetic >=6.5%; Glycemic  control for adults with diabetes <7.0% %     mg/dl   Lipid panel   Result Value Ref Range    Cholesterol 138 See Comment mg/dL    Triglycerides 148 See Comment mg/dL    HDL, Direct 41 (L) >=50 mg/dL    LDL Calculated 67 0 - 100 mg/dL    Non-HDL-Chol (CHOL-HDL) 97 mg/dl   Vitamin D 25 hydroxy   Result Value Ref Range    Vit D, 25-Hydroxy 44.6 30.0 - 100.0 ng/mL        Radiology Results Review: I have reviewed radiology reports from 4/24/2025 including: CT chest and CT abdomen/pelvis.    CT chest abdomen pelvis w contrast    Narrative & Impression   CT CHEST, ABDOMEN AND PELVIS WITH IV CONTRAST     INDICATION: Z85.9: Personal history of malignant neoplasm, unspecified.     COMPARISON: CT chest abdomen and pelvis 10/18/2024     TECHNIQUE: CT examination of the chest, abdomen and pelvis was performed. Multiplanar 2D reformatted images were created from the source data.     This examination, like all CT scans performed in the [facility], was performed utilizing techniques to minimize radiation dose exposure, including the use of iterative reconstruction and automated exposure control. Radiation dose length   product (DLP) for this visit: 1251.11 mGy-cm.     IV Contrast: 100 mL of iohexol  Enteric Contrast: Administered.     FINDINGS:     CHEST     LUNGS: No suspicious pulmonary nodules. No tracheal or endobronchial lesion.     PLEURA: Unremarkable.     HEART/GREAT VESSELS: Heart is unremarkable for patient's age. No thoracic aortic aneurysm. Coronary artery calcifications.     MEDIASTINUM AND KAILYN: No pathologic lymphadenopathy.     CHEST WALL AND LOWER NECK: Unremarkable.     ABDOMEN     LIVER/BILIARY TREE: Subcentimeter hypoattenuating lesion(s), too small to characterize but statistically likely benign, which do not require follow-up (ACR White Paper 2017). No suspicious mass. Normal hepatic contours. No biliary dilation.     GALLBLADDER: No calcified gallstones. No pericholecystic inflammatory  change.     SPLEEN: Stable 1.6 cm splenic hypodensity, probable cyst or hemangioma.     PANCREAS: Unremarkable.     ADRENAL GLANDS: Stable 1.2 cm right adrenal nodule.     KIDNEYS/URETERS: No hydronephrosis or urinary tract calculi. Subcentimeter hypoattenuating renal lesion(s), too small to characterize but statistically likely benign, which do not warrant follow-up (Radiology June 2019).     STOMACH AND BOWEL: Stable small gastric fundus diverticulum. No dilated loops of bowel or bowel wall thickening. Stable appearance of anastomosis in the right lower quadrant.     APPENDIX: Normal.     ABDOMINOPELVIC CAVITY: No ascites. No pneumoperitoneum. No lymphadenopathy.     VESSELS: Unremarkable for patient's age.     PELVIS     REPRODUCTIVE ORGANS: Stable prominence of the endometrium and posterior exophytic uterine fibroid.     URINARY BLADDER: Unremarkable.     ABDOMINAL WALL/INGUINAL REGIONS: Stable right iliopsoas tendon sheath and bursal distention.     BONES: No acute fracture or suspicious osseous lesion. Spinal degenerative changes.     IMPRESSION:     No evidence of metastasis in the chest, abdomen, or pelvis.

## 2025-05-05 NOTE — LETTER
May 5, 2025     Janes Marie MD  [address]  [address]nd [address]  [place] [facility] [address]    Patient: Radha Stephen   YOB: 1950   Date of Visit: 2025       Dear Dr. Janes Marie MD:    Thank you for referring Radha Stephen to me for evaluation. Below are my notes for this consultation.    If you have questions, please do not hesitate to call me. I look forward to following your patient along with you.         Sincerely,        JAHAIRA Lujan        CC: No Recipients    JAHAIRA Lujan  2025 10:09 AM  Sign when Signing Visit  Name: Radha Stephen      : 1950      MRN: [id number]  Encounter Provider: JAHAIRA Lujan  Encounter Date: 2025   Encounter department: [facility] [place]  :  Assessment & Plan  Encounter for follow-up examination after completed treatment for malignant neoplasm         History of malignant neuroendocrine tumor  The patient is doing very well, and there is no evidence of disease recurrence by imaging. Her tumor marker remains low.  We will see her again in 1 year with repeat CT and chromogranin A level.  Orders:  •  CT chest abdomen pelvis w contrast; Future  •  BUN; Future  •  Creatinine, serum; Future  •  Chromogranin A; Future        History of Present Illness  Chief Complaint   Patient presents with   • Follow-up     Return in about 1 year (around 2026) for Office visit with Dr Marie, Imaging - See orders, Labs - See Treatment Plan.    Pertinent Medical History  This is a 75 y/o female who returns to the office today in follow-up for her history of a small bowel neuroendocrine tumor.  She is currently KAREN at 6 years, and reports she feels well. She denies any abdominal pain, N/V, diarrhea, or unintentional weight loss. She denies flushing, sweating or palpitations.   CT scan was performed on , and a chromogranin A level has been drawn. I have reviewed these results with her today,  and discussed the plan for continued surveillance.      Oncology History  Cancer Staging   History of malignant neuroendocrine tumor  Staging form: Neuroendocrine Tumor - Jejunum/Ileum, AJCC 8th Edition  - Pathologic stage from 4/29/2020: Stage III (pT3, pN1, cM0) - Signed by JAHAIRA Lujan on 10/5/2023  Mitotic count: 3  Histologic grade (G): G2  Histologic grading system: 3 grade system  Lymph-vascular invasion (LVI): LVI present/identified, NOS  Mitotic unit: 10 HPF  Solitary (s) or multifocal (m) tumors in the primary site: Solitary  Clinical status of regional lymph nodes: Metastases confirmed by pathologic examination  Total positive nodes: 4  Oncology History   History of malignant neuroendocrine tumor   3/24/2020 Initial Diagnosis    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       - Lymph-vascular and perineural invasion are identified.       - 4 of 13 lymph nodes positive for metastatic well-differentiated neuroendocrine tumor (4/13).       - Largest lymph node deposit: 1.5 cm with extra-emigdio extension, (mesenteric mass).       - 3 mitotic figures per 2 mm2, (consistent with grade G2).       - The tumor is extremely close to the serosal surface (approximately .0003 cm from serosal surface).     4/29/2020 -  Cancer Staged    Staging form: Neuroendocrine Tumor - Jejunum/Ileum, AJCC 8th Edition  - Pathologic stage from 4/29/2020: Stage III (pT3, pN1, cM0) - Signed by JAHAIRA Lujan on 10/5/2023  Mitotic count: 3  Histologic grade (G): G2  Histologic grading system: 3 grade system  Lymph-vascular invasion (LVI): LVI present/identified, NOS  Mitotic unit: 10 HPF  Solitary (s) or multifocal (m) tumors in the primary site: Solitary  Clinical status of regional lymph nodes: Metastases confirmed by pathologic examination  Total positive nodes: 4      "      Review of Systems A complete review of systems is negative other than that noted above in the HPI.       Current Outpatient Medications   Medication Sig Dispense Refill   • Cholecalciferol (Vitamin D3) 50 MCG (2000 UT) capsule Take 1 capsule (2,000 Units total) by mouth every morning     • co-enzyme Q-10 50 MG capsule Take 1 capsule (50 mg total) by mouth every other day     • fluticasone (FLONASE) 50 mcg/act nasal spray administer 1 spray into each nostril daily (Patient taking differently: 2 sprays into each nostril as needed for rhinitis or allergies) 48 g 1   • hydrALAZINE (APRESOLINE) 50 mg tablet Take 1 tablet (50 mg total) by mouth 3 (three) times a day 270 tablet 3   • hydroquinone 4 % cream      • lisinopril (ZESTRIL) 20 mg tablet Take 1 tablet (20 mg total) by mouth 2 (two) times a day 180 tablet 0   • metoprolol tartrate (LOPRESSOR) 100 mg tablet TAKE ONE TABLET BY MOUTH TWICE A  tablet 3   • metroNIDAZOLE (METROCREAM) 0.75 % cream      • metroNIDAZOLE (METROGEL) 0.75 % gel      • Multiple Vitamins-Minerals (CENTRUM PO) Take by mouth in the morning     • rosuvastatin (CRESTOR) 20 MG tablet Take 1 tablet (20 mg total) by mouth 2 (two) times a week 51 tablet 0   • triamcinolone (KENALOG) 0.1 % cream        Current Facility-Administered Medications   Medication Dose Route Frequency Provider Last Rate Last Admin   • cephalexin (KEFLEX) capsule 500 mg  500 mg Oral Q6H [facility] Edita Pires MD          Objective  /86   Pulse 68   Temp (!) 97.3 °F (36.3 °C)   Ht 5' 8\" (1.727 m)   Wt 91.4 kg (201 lb 6.4 oz)   SpO2 99%   BMI 30.62 kg/m²     Pain Screening:  Pain Score: 0-No pain  ECOG    Physical Exam  Vitals reviewed.   Constitutional:       General: She is not in acute distress.     Appearance: Normal appearance. She is not ill-appearing or toxic-appearing.   HENT:      Head: Normocephalic and atraumatic.   Eyes:      General: No scleral icterus.  Cardiovascular:      Rate and Rhythm: " Normal rate.   Pulmonary:      Effort: Pulmonary effort is normal.   Abdominal:      General: Abdomen is flat. A surgical scar is present. There is no distension.      Palpations: Abdomen is soft. There is no hepatomegaly or mass.      Tenderness: There is no abdominal tenderness. There is no guarding.   Musculoskeletal:         General: Normal range of motion.      Cervical back: Normal range of motion and neck supple.   Skin:     General: Skin is warm and dry.   Neurological:      General: No focal deficit present.      Mental Status: She is alert and oriented to person, place, and time.   Psychiatric:         Mood and Affect: Mood normal.         Behavior: Behavior normal.         Thought Content: Thought content normal.         Judgment: Judgment normal.           Labs: I have reviewed pertinent labs.   Results for orders placed or performed in visit on 04/11/25   Result Value Ref Range    Chromogranin A 65.2 0.0 - 101.8 ng/mL   CBC and differential   Result Value Ref Range    WBC 6.20 4.31 - 10.16 Thousand/uL    RBC 4.13 3.81 - 5.12 Million/uL    Hemoglobin 12.3 11.5 - 15.4 g/dL    Hematocrit 37.3 34.8 - 46.1 %    MCV 90 82 - 98 fL    MCH 29.8 26.8 - 34.3 pg    MCHC 33.0 31.4 - 37.4 g/dL    RDW 13.2 11.6 - 15.1 %    MPV 9.2 8.9 - 12.7 fL    Platelets 361 149 - 390 Thousands/uL    nRBC 0 /100 WBCs    Segmented % 48 43 - 75 %    Immature Grans % 0 0 - 2 %    Lymphocytes % 42 14 - 44 %    Monocytes % 8 4 - 12 %    Eosinophils Relative 1 0 - 6 %    Basophils Relative 1 0 - 1 %    Absolute Neutrophils 3.00 1.85 - 7.62 Thousands/µL    Absolute Immature Grans 0.02 0.00 - 0.20 Thousand/uL    Absolute Lymphocytes 2.57 0.60 - 4.47 Thousands/µL    Absolute Monocytes 0.51 0.17 - 1.22 Thousand/µL    Eosinophils Absolute 0.07 0.00 - 0.61 Thousand/µL    Basophils Absolute 0.03 0.00 - 0.10 Thousands/µL   Comprehensive metabolic panel   Result Value Ref Range    Sodium 139 135 - 147 mmol/L    Potassium 3.9 3.5 - 5.3 mmol/L     Chloride 103 96 - 108 mmol/L    CO2 28 21 - 32 mmol/L    ANION GAP 8 4 - 13 mmol/L    BUN 14 5 - 25 mg/dL    Creatinine 0.94 0.60 - 1.30 mg/dL    Glucose, Fasting 89 65 - 99 mg/dL    Calcium 9.1 8.4 - 10.2 mg/dL    AST 14 13 - 39 U/L    ALT 10 7 - 52 U/L    Alkaline Phosphatase 52 34 - 104 U/L    Total Protein 7.0 6.4 - 8.4 g/dL    Albumin 4.2 3.5 - 5.0 g/dL    Total Bilirubin 0.52 0.20 - 1.00 mg/dL    eGFR 60 ml/min/1.73sq m   Hemoglobin A1C   Result Value Ref Range    Hemoglobin A1C 5.6 Normal 4.0-5.6%; PreDiabetic 5.7-6.4%; Diabetic >=6.5%; Glycemic control for adults with diabetes <7.0% %     mg/dl   Lipid panel   Result Value Ref Range    Cholesterol 138 See Comment mg/dL    Triglycerides 148 See Comment mg/dL    HDL, Direct 41 (L) >=50 mg/dL    LDL Calculated 67 0 - 100 mg/dL    Non-HDL-Chol (CHOL-HDL) 97 mg/dl   Vitamin D 25 hydroxy   Result Value Ref Range    Vit D, 25-Hydroxy 44.6 30.0 - 100.0 ng/mL        Radiology Results Review: I have reviewed radiology reports from 4/24/2025 including: CT chest and CT abdomen/pelvis.    CT chest abdomen pelvis w contrast    Narrative & Impression   CT CHEST, ABDOMEN AND PELVIS WITH IV CONTRAST     INDICATION: Z85.9: Personal history of malignant neoplasm, unspecified.     COMPARISON: CT chest abdomen and pelvis 10/18/2024     TECHNIQUE: CT examination of the chest, abdomen and pelvis was performed. Multiplanar 2D reformatted images were created from the source data.     This examination, like all CT scans performed in the [facility], was performed utilizing techniques to minimize radiation dose exposure, including the use of iterative reconstruction and automated exposure control. Radiation dose length   product (DLP) for this visit: 1251.11 mGy-cm.     IV Contrast: 100 mL of iohexol  Enteric Contrast: Administered.     FINDINGS:     CHEST     LUNGS: No suspicious pulmonary nodules. No tracheal or endobronchial lesion.     PLEURA:  Unremarkable.     HEART/GREAT VESSELS: Heart is unremarkable for patient's age. No thoracic aortic aneurysm. Coronary artery calcifications.     MEDIASTINUM AND KAILYN: No pathologic lymphadenopathy.     CHEST WALL AND LOWER NECK: Unremarkable.     ABDOMEN     LIVER/BILIARY TREE: Subcentimeter hypoattenuating lesion(s), too small to characterize but statistically likely benign, which do not require follow-up (ACR White Paper 2017). No suspicious mass. Normal hepatic contours. No biliary dilation.     GALLBLADDER: No calcified gallstones. No pericholecystic inflammatory change.     SPLEEN: Stable 1.6 cm splenic hypodensity, probable cyst or hemangioma.     PANCREAS: Unremarkable.     ADRENAL GLANDS: Stable 1.2 cm right adrenal nodule.     KIDNEYS/URETERS: No hydronephrosis or urinary tract calculi. Subcentimeter hypoattenuating renal lesion(s), too small to characterize but statistically likely benign, which do not warrant follow-up (Radiology June 2019).     STOMACH AND BOWEL: Stable small gastric fundus diverticulum. No dilated loops of bowel or bowel wall thickening. Stable appearance of anastomosis in the right lower quadrant.     APPENDIX: Normal.     ABDOMINOPELVIC CAVITY: No ascites. No pneumoperitoneum. No lymphadenopathy.     VESSELS: Unremarkable for patient's age.     PELVIS     REPRODUCTIVE ORGANS: Stable prominence of the endometrium and posterior exophytic uterine fibroid.     URINARY BLADDER: Unremarkable.     ABDOMINAL WALL/INGUINAL REGIONS: Stable right iliopsoas tendon sheath and bursal distention.     BONES: No acute fracture or suspicious osseous lesion. Spinal degenerative changes.     IMPRESSION:     No evidence of metastasis in the chest, abdomen, or pelvis.

## 2025-05-12 ENCOUNTER — OFFICE VISIT (OUTPATIENT)
Dept: FAMILY MEDICINE CLINIC | Facility: CLINIC | Age: 75
End: 2025-05-12
Payer: COMMERCIAL

## 2025-05-12 VITALS
RESPIRATION RATE: 17 BRPM | BODY MASS INDEX: 30.16 KG/M2 | HEIGHT: 68 IN | DIASTOLIC BLOOD PRESSURE: 84 MMHG | WEIGHT: 199 LBS | TEMPERATURE: 97.6 F | OXYGEN SATURATION: 100 % | HEART RATE: 52 BPM | SYSTOLIC BLOOD PRESSURE: 130 MMHG

## 2025-05-12 DIAGNOSIS — Z00.00 ANNUAL PHYSICAL EXAM: ICD-10-CM

## 2025-05-12 DIAGNOSIS — D51.8 VITAMIN B12 DEFICIENCY (DIETARY) ANEMIA: ICD-10-CM

## 2025-05-12 DIAGNOSIS — R94.6 ABNORMAL RESULTS OF THYROID FUNCTION STUDIES: ICD-10-CM

## 2025-05-12 DIAGNOSIS — E55.9 VITAMIN D DEFICIENCY: ICD-10-CM

## 2025-05-12 DIAGNOSIS — Z79.899 ON LONG TERM DRUG THERAPY: ICD-10-CM

## 2025-05-12 DIAGNOSIS — R73.01 IMPAIRED FASTING GLUCOSE: ICD-10-CM

## 2025-05-12 DIAGNOSIS — E78.00 PURE HYPERCHOLESTEROLEMIA: ICD-10-CM

## 2025-05-12 DIAGNOSIS — N18.31 STAGE 3A CHRONIC KIDNEY DISEASE (HCC): ICD-10-CM

## 2025-05-12 DIAGNOSIS — I10 ESSENTIAL HYPERTENSION: Primary | Chronic | ICD-10-CM

## 2025-05-12 PROCEDURE — 99213 OFFICE O/P EST LOW 20 MIN: CPT | Performed by: NURSE PRACTITIONER

## 2025-05-12 PROCEDURE — G2211 COMPLEX E/M VISIT ADD ON: HCPCS | Performed by: NURSE PRACTITIONER

## 2025-05-12 RX ORDER — LISINOPRIL 20 MG/1
20 TABLET ORAL 2 TIMES DAILY
Qty: 200 TABLET | Refills: 3 | Status: SHIPPED | OUTPATIENT
Start: 2025-05-12

## 2025-05-12 RX ORDER — METOPROLOL TARTRATE 100 MG/1
100 TABLET ORAL EVERY 12 HOURS SCHEDULED
Qty: 200 TABLET | Refills: 3 | Status: SHIPPED | OUTPATIENT
Start: 2025-05-12

## 2025-05-12 RX ORDER — HYDRALAZINE HYDROCHLORIDE 50 MG/1
50 TABLET, FILM COATED ORAL 3 TIMES DAILY
Qty: 300 TABLET | Refills: 3 | Status: SHIPPED | OUTPATIENT
Start: 2025-05-12 | End: 2026-06-16

## 2025-05-12 RX ORDER — ROSUVASTATIN CALCIUM 20 MG/1
20 TABLET, COATED ORAL DAILY
Qty: 100 TABLET | Refills: 3 | Status: SHIPPED | OUTPATIENT
Start: 2025-05-12

## 2025-05-12 NOTE — ASSESSMENT & PLAN NOTE
Orders:    hydrALAZINE (APRESOLINE) 50 mg tablet; Take 1 tablet (50 mg total) by mouth 3 (three) times a day    lisinopril (ZESTRIL) 20 mg tablet; Take 1 tablet (20 mg total) by mouth 2 (two) times a day    metoprolol tartrate (LOPRESSOR) 100 mg tablet; Take 1 tablet (100 mg total) by mouth every 12 (twelve) hours TAKE ONE TABLET BY MOUTH TWICE A DAY

## 2025-05-12 NOTE — PROGRESS NOTES
Name: Radha Stephen      : 1950      MRN: [id number]  Encounter Provider: JAHAIRA Branham  Encounter Date: 2025   Encounter department: [facility]  :  Assessment & Plan  Essential hypertension    Orders:    hydrALAZINE (APRESOLINE) 50 mg tablet; Take 1 tablet (50 mg total) by mouth 3 (three) times a day    lisinopril (ZESTRIL) 20 mg tablet; Take 1 tablet (20 mg total) by mouth 2 (two) times a day    metoprolol tartrate (LOPRESSOR) 100 mg tablet; Take 1 tablet (100 mg total) by mouth every 12 (twelve) hours TAKE ONE TABLET BY MOUTH TWICE A DAY    Pure hypercholesterolemia    Orders:    rosuvastatin (CRESTOR) 20 MG tablet; Take 1 tablet (20 mg total) by mouth daily    Stage 3a chronic kidney disease (HCC)  Lab Results   Component Value Date    EGFR 60 2025    EGFR 55 10/04/2024    EGFR 60 04/10/2024    CREATININE 0.94 2025    CREATININE 1.01 10/04/2024    CREATININE 0.94 04/10/2024                  [place]alls Plan of Care: Assessed feet and footwear. Home safety education provided.     Urinary Incontinence Plan of Care: counseling topics discussed: practice Kegel (pelvic floor strengthening) exercises, use restroom every 2 hours, limit alcohol, caffeine, spicy foods, and acidic foods, limiting fluid intake 3-4 hours before bed, preventing constipation and improving blood sugar control.       History of Present Illness   The patient is here today to discuss her medications and treatment plans.   I reviewed their medical conditions, medications, laboratory and radiology studies.  I reviewed their scheduled future lab studies with the patient.   The patient's current treatment plan is effective.   There is no change in the current therapy.   I ask the patient to continue their current therapy.   The patient voiced their understanding and agreement.   Follow up after 2025 for her medicare annual wellness visit .  Please continue to the PORCH section of the  "note for details of today's visit.             Review of Systems   Constitutional:  Negative for activity change, chills, fatigue and fever.   HENT:  Negative for rhinorrhea and sore throat.    Eyes:  Negative for pain.   Respiratory:  Negative for cough and shortness of breath.    Cardiovascular:  Negative for chest pain, palpitations and leg swelling.   Gastrointestinal:  Negative for abdominal pain, constipation, diarrhea, nausea and vomiting.   Genitourinary:  Negative for difficulty urinating, flank pain, frequency and urgency.   Musculoskeletal:  Negative for gait problem, joint swelling and myalgias.   Skin:  Negative for color change.   Neurological:  Negative for dizziness, weakness, light-headedness and headaches.   Psychiatric/Behavioral:  Negative for sleep disturbance. The patient is not nervous/anxious.    All other systems reviewed and are negative.      Objective   /84 (BP Location: Left arm, Patient Position: Sitting, Cuff Size: Large)   Pulse (!) 52   Temp 97.6 °F (36.4 °C) (Tympanic)   Resp 17   Ht 5' 8\" (1.727 m)   Wt 90.3 kg (199 lb)   SpO2 100%   BMI 30.26 kg/m²      Physical Exam  Vitals and nursing note reviewed.   Constitutional:       General: She is awake. She is not in acute distress.     Appearance: Normal appearance. She is well-developed.   HENT:      Head: Normocephalic and atraumatic.      Nose: Nose normal.      Mouth/Throat:      Mouth: Mucous membranes are moist.   Eyes:      Conjunctiva/sclera: Conjunctivae normal.   Cardiovascular:      Rate and Rhythm: Normal rate and regular rhythm.      Pulses: Normal pulses.      Heart sounds: Normal heart sounds. No murmur heard.  Pulmonary:      Effort: Pulmonary effort is normal. No respiratory distress.      Breath sounds: Normal breath sounds.   Abdominal:      General: Bowel sounds are normal.      Palpations: Abdomen is soft.      Tenderness: There is no abdominal tenderness.   Musculoskeletal:      Cervical back: Neck " supple.      Right lower leg: No edema.      Left lower leg: No edema.   Skin:     General: Skin is warm and dry.   Neurological:      Mental Status: She is alert and oriented to person, place, and time.   Psychiatric:         Attention and Perception: Attention normal.         Mood and Affect: Mood normal.         Speech: Speech normal.         Behavior: Behavior normal. Behavior is cooperative.         Thought Content: Thought content normal.         Cognition and Memory: Cognition normal.         Judgment: Judgment normal.       Administrative Statements   I have spent a total time of 20 minutes in caring for this patient on the day of the visit/encounter including Diagnostic results, Prognosis, Risks and benefits of tx options, Instructions for management, Patient and family education, Importance of tx compliance, Risk factor reductions, Impressions, Counseling / Coordination of care, Documenting in the medical record, Reviewing/placing orders in the medical record (including tests, medications, and/or procedures), and Obtaining or reviewing history  .

## 2025-05-12 NOTE — ASSESSMENT & PLAN NOTE
Lab Results   Component Value Date    EGFR 60 04/11/2025    EGFR 55 10/04/2024    EGFR 60 04/10/2024    CREATININE 0.94 04/11/2025    CREATININE 1.01 10/04/2024    CREATININE 0.94 04/10/2024

## (undated) DEVICE — SUT SILK 2-0 SH CR/8 18 IN C012D

## (undated) DEVICE — PROXIMATE RELOADABLE LINEAR STAPLER, 60MM: Brand: PROXIMATE

## (undated) DEVICE — LAPAROSCOPY PACK: Brand: MEDLINE INDUSTRIES, INC.

## (undated) DEVICE — GLOVE INDICATOR PI UNDERGLOVE SZ 8 BLUE

## (undated) DEVICE — LAPAROSCOPIC SCISSORS: Brand: EPIX LAPAROSCOPIC SCISSORS

## (undated) DEVICE — PROXIMATE SKIN STAPLERS (35 WIDE) CONTAINS 35 STAINLESS STEEL STAPLES (FIXED HEAD): Brand: PROXIMATE

## (undated) DEVICE — TELFA NON-ADHERENT ABSORBENT DRESSING: Brand: TELFA

## (undated) DEVICE — INTENDED FOR TISSUE SEPARATION, AND OTHER PROCEDURES THAT REQUIRE A SHARP SURGICAL BLADE TO PUNCTURE OR CUT.: Brand: BARD-PARKER SAFETY BLADES SIZE 15, STERILE

## (undated) DEVICE — ASTOUND STANDARD SURGICAL GOWN, XL: Brand: CONVERTORS

## (undated) DEVICE — INTENDED FOR TISSUE SEPARATION, AND OTHER PROCEDURES THAT REQUIRE A SHARP SURGICAL BLADE TO PUNCTURE OR CUT.: Brand: BARD-PARKER ® CARBON RIB-BACK BLADES

## (undated) DEVICE — CHLORAPREP HI-LITE 26ML ORANGE

## (undated) DEVICE — TUBING SUCTION 5MM X 12 FT

## (undated) DEVICE — ADHESIVE SKIN HIGH VISCOSITY EXOFIN 1ML

## (undated) DEVICE — STERILE EMESIS BASIN                 070: Brand: CARDINAL HEALTH

## (undated) DEVICE — GLOVE SRG BIOGEL ECLIPSE 8

## (undated) DEVICE — 3M™ TEGADERM™ TRANSPARENT FILM DRESSING FRAME STYLE, 1627, 4 IN X 10 IN (10 CM X 25 CM), 20/CT 4CT/CASE: Brand: 3M™ TEGADERM™

## (undated) DEVICE — GLOVE SRG BIOGEL 7.5

## (undated) DEVICE — DRESSING GUAZE ADH BORDER 4 X 4 IN

## (undated) DEVICE — ABDOMINAL PAD: Brand: DERMACEA

## (undated) DEVICE — 3000CC GUARDIAN II: Brand: GUARDIAN

## (undated) DEVICE — MEDI-VAC YANK SUCT HNDL W/TPRD BULBOUS TIP: Brand: CARDINAL HEALTH

## (undated) DEVICE — SUT VICRYL 2-0 D-SPECIAL 27 IN D8114

## (undated) DEVICE — Device

## (undated) DEVICE — HARMONIC 1100 SHEARS, 20CM SHAFT LENGTH: Brand: HARMONIC

## (undated) DEVICE — BASIC SINGLE BASIN 2-LF: Brand: MEDLINE INDUSTRIES, INC.

## (undated) DEVICE — GLOVE INDICATOR UNDERGLOVE SZ 7 GREEN

## (undated) DEVICE — MAX-CORE® DISPOSABLE CORE BIOPSY INSTRUMENT, 18G X 20CM: Brand: MAX-CORE

## (undated) DEVICE — 3M™ IOBAN™ 2 ANTIMICROBIAL INCISE DRAPE 6650EZ: Brand: IOBAN™ 2

## (undated) DEVICE — LUBRICANT SURGILUBE TUBE 4 OZ  FLIP TOP

## (undated) DEVICE — INTENDED FOR TISSUE SEPARATION, AND OTHER PROCEDURES THAT REQUIRE A SHARP SURGICAL BLADE TO PUNCTURE OR CUT.: Brand: BARD-PARKER SAFETY BLADES SIZE 10, STERILE

## (undated) DEVICE — PLUMEPEN PRO 10FT

## (undated) DEVICE — LIGACLIP MCA MULTIPLE CLIP APPLIERS, 20 MEDIUM CLIPS: Brand: LIGACLIP

## (undated) DEVICE — PROXIMATE LINEAR CUTTER RELOAD, BLUE, 75MM: Brand: PROXIMATE

## (undated) DEVICE — SPONGE GAUZE 2 X 2 4PLY STRL

## (undated) DEVICE — TROCAR: Brand: KII FIOS FIRST ENTRY

## (undated) DEVICE — GLOVE INDICATOR PI UNDERGLOVE SZ 7.5 BLUE

## (undated) DEVICE — BETHLEHEM MAJOR GENERAL PACK: Brand: CARDINAL HEALTH

## (undated) DEVICE — Device: Brand: KII® BALLOON BLUNT TIP SYSTEM

## (undated) DEVICE — SUT SILK 2-0 18 IN A185H

## (undated) DEVICE — SUT VICRYL 0 UR-6 27 IN J603H

## (undated) DEVICE — TROCAR 11MM KIT OPTICAL SEPARATOR SYSTEM

## (undated) DEVICE — INSULATED BLADE ELECTRODE;CAUTION: FOR MANUFACTURING, PROCESSING, OR REPACKING.: Brand: EDGE

## (undated) DEVICE — 3M™ TEGADERM™ TRANSPARENT FILM DRESSING FRAME STYLE, 1624W, 2-3/8 IN X 2-3/4 IN (6 CM X 7 CM), 100/CT 4CT/CASE: Brand: 3M™ TEGADERM™

## (undated) DEVICE — GRASPER ATRAUMATIC FEN 5MM X 33CM FIXED THUMB LOOP GENI-SURGE

## (undated) DEVICE — [HIGH FLOW INSUFFLATOR,  DO NOT USE IF PACKAGE IS DAMAGED,  KEEP DRY,  KEEP AWAY FROM SUNLIGHT,  PROTECT FROM HEAT AND RADIOACTIVE SOURCES.]: Brand: PNEUMOSURE

## (undated) DEVICE — POOLE SUCTION HANDLE: Brand: CARDINAL HEALTH

## (undated) DEVICE — NEEDLE 25G X 5/8

## (undated) DEVICE — SUT SILK 3-0 SH CR/8 18 IN C013D

## (undated) DEVICE — TRAY FOLEY 16FR URIMETER SURESTEP

## (undated) DEVICE — 1840 FOAM BLOCK NEEDLE COUNTER: Brand: DEVON

## (undated) DEVICE — GLOVE INDICATOR UNDERGLOVE SZ 8 GREEN

## (undated) DEVICE — STRL UNIVERSAL MINOR GENERAL: Brand: CARDINAL HEALTH

## (undated) DEVICE — PROXIMATE RELOADABLE LINEAR CUTTER WITH SAFETY LOCK-OUT, 75MM: Brand: PROXIMATE

## (undated) DEVICE — GAUZE SPONGES,16 PLY: Brand: CURITY

## (undated) DEVICE — SUT VICRYL 4-0 SH 27 IN J415H

## (undated) DEVICE — SPONGE STICK WITH PVP-I: Brand: KENDALL